# Patient Record
Sex: MALE | Race: WHITE | Employment: OTHER | URBAN - NONMETROPOLITAN AREA
[De-identification: names, ages, dates, MRNs, and addresses within clinical notes are randomized per-mention and may not be internally consistent; named-entity substitution may affect disease eponyms.]

---

## 2020-10-12 ENCOUNTER — APPOINTMENT (OUTPATIENT)
Dept: CT IMAGING | Age: 65
End: 2020-10-12
Payer: MEDICARE

## 2020-10-12 ENCOUNTER — HOSPITAL ENCOUNTER (EMERGENCY)
Age: 65
Discharge: HOME | End: 2020-10-13
Attending: EMERGENCY MEDICINE
Payer: MEDICARE

## 2020-10-12 LAB
BASOPHILS ABSOLUTE: 0.4 K/UL (ref 0–0.2)
BASOPHILS RELATIVE PERCENT: 6.1 %
EOSINOPHILS ABSOLUTE: 0.1 K/UL (ref 0–0.6)
EOSINOPHILS RELATIVE PERCENT: 1.5 %
HCT VFR BLD CALC: 46.5 % (ref 40.5–52.5)
HEMOGLOBIN: 16 G/DL (ref 13.5–17.5)
LYMPHOCYTES ABSOLUTE: 1.7 K/UL (ref 1–5.1)
LYMPHOCYTES RELATIVE PERCENT: 25.6 %
MAGNESIUM: 2.2 MG/DL (ref 1.8–2.4)
MCH RBC QN AUTO: 35 PG (ref 26–34)
MCHC RBC AUTO-ENTMCNC: 34.5 G/DL (ref 31–36)
MCV RBC AUTO: 101.4 FL (ref 80–100)
MONOCYTES ABSOLUTE: 0.7 K/UL (ref 0–1.3)
MONOCYTES RELATIVE PERCENT: 11 %
NEUTROPHILS ABSOLUTE: 3.7 K/UL (ref 1.7–7.7)
NEUTROPHILS RELATIVE PERCENT: 55.8 %
PDW BLD-RTO: 13.3 % (ref 12.4–15.4)
PLATELET # BLD: 215 K/UL (ref 135–450)
PMV BLD AUTO: 7.4 FL (ref 5–10.5)
RBC # BLD: 4.58 M/UL (ref 4.2–5.9)
TROPONIN: <0.01 NG/ML
WBC # BLD: 6.7 K/UL (ref 4–11)

## 2020-10-12 PROCEDURE — 2580000003 HC RX 258: Performed by: EMERGENCY MEDICINE

## 2020-10-12 PROCEDURE — 36415 COLL VENOUS BLD VENIPUNCTURE: CPT

## 2020-10-12 PROCEDURE — 85025 COMPLETE CBC W/AUTO DIFF WBC: CPT

## 2020-10-12 PROCEDURE — 6370000000 HC RX 637 (ALT 250 FOR IP): Performed by: EMERGENCY MEDICINE

## 2020-10-12 PROCEDURE — 84484 ASSAY OF TROPONIN QUANT: CPT

## 2020-10-12 PROCEDURE — 93005 ELECTROCARDIOGRAM TRACING: CPT | Performed by: EMERGENCY MEDICINE

## 2020-10-12 PROCEDURE — 99284 EMERGENCY DEPT VISIT MOD MDM: CPT

## 2020-10-12 PROCEDURE — 70450 CT HEAD/BRAIN W/O DYE: CPT

## 2020-10-12 PROCEDURE — 83735 ASSAY OF MAGNESIUM: CPT

## 2020-10-12 RX ORDER — 0.9 % SODIUM CHLORIDE 0.9 %
1000 INTRAVENOUS SOLUTION INTRAVENOUS ONCE
Status: COMPLETED | OUTPATIENT
Start: 2020-10-12 | End: 2020-10-12

## 2020-10-12 RX ORDER — MECLIZINE HYDROCHLORIDE 25 MG/1
25 TABLET ORAL ONCE
Status: COMPLETED | OUTPATIENT
Start: 2020-10-12 | End: 2020-10-12

## 2020-10-12 RX ORDER — IBUPROFEN 400 MG/1
400 TABLET ORAL NIGHTLY
Status: ON HOLD | COMMUNITY
End: 2021-05-22 | Stop reason: HOSPADM

## 2020-10-12 RX ADMIN — MECLIZINE HYDROCHLORIDE 25 MG: 25 TABLET ORAL at 23:16

## 2020-10-12 RX ADMIN — SODIUM CHLORIDE 1000 ML: 9 INJECTION, SOLUTION INTRAVENOUS at 22:13

## 2020-10-12 NOTE — LETTER
[address] [facility]  [address] [address]  Phone: [phone]               October 13, 2020    Patient: Simi Campos   YOB: 1955   Date of Visit: 10/12/2020       To Whom It May Concern:    Simi Campos was seen and treated in our emergency department on 10/12/2020. Delfino Confer       Sincerely,       Amara Giron RN         Signature:__________________________________

## 2020-10-13 VITALS
SYSTOLIC BLOOD PRESSURE: 122 MMHG | DIASTOLIC BLOOD PRESSURE: 76 MMHG | TEMPERATURE: 96.6 F | OXYGEN SATURATION: 99 % | HEIGHT: 67 IN | WEIGHT: 230 LBS | BODY MASS INDEX: 36.1 KG/M2 | RESPIRATION RATE: 16 BRPM | HEART RATE: 93 BPM

## 2020-10-13 LAB
EKG ATRIAL RATE: 98 BPM
EKG DIAGNOSIS: NORMAL
EKG P AXIS: 47 DEGREES
EKG P-R INTERVAL: 154 MS
EKG Q-T INTERVAL: 370 MS
EKG QRS DURATION: 90 MS
EKG QTC CALCULATION (BAZETT): 472 MS
EKG R AXIS: 18 DEGREES
EKG T AXIS: 45 DEGREES
EKG VENTRICULAR RATE: 98 BPM

## 2020-10-13 PROCEDURE — 93010 ELECTROCARDIOGRAM REPORT: CPT | Performed by: INTERNAL MEDICINE

## 2020-10-13 RX ORDER — MECLIZINE HYDROCHLORIDE 25 MG/1
25 TABLET ORAL 3 TIMES DAILY PRN
Qty: 15 TABLET | Refills: 0 | Status: SHIPPED | OUTPATIENT
Start: 2020-10-13 | End: 2020-10-23

## 2020-10-13 NOTE — ED PROVIDER NOTES
Emergency Department Attending Note    Kimberley Owens MD    Date of ED VIsit: 10/12/2020    CHIEF COMPLAINT  Dizziness (pt states been dizzy all day today, had hx of vertigo)      Kayden Chaney is a 62 y.o. male  With Vital signs of /76   Pulse 93   Temp 96.6 °F (35.9 °C) (Tympanic)   Resp 16   Ht 5' 7\" (1.702 m)   Wt 230 lb (104.3 kg)   SpO2 99%   BMI 36.02 kg/m²  who presents to the ED with a complaint of vertigo. Patient seen and evaluated in room 6. Patient comes in complaining of what what sounds like vertiginous symptoms. He is actually use the term vertigo in his descriptor saying that he has had it before. Today he states that it is definitely room spinning not syncope or near syncope. Happened this evening and that is what prompted him to come into the emergency department for evaluation last time he happened was years ago. He reports no other neurologic symptoms including any visual problems although he is legally blind. He is got no speech problems no other weaknesses in his arms or legs. No nausea with this. .  No other complaints, modifying factors or associated symptoms. Patients Past medical history reviewed and listed below  Past Medical History:   Diagnosis Date    Blind in both eyes     legally    Hyperlipidemia     Hypertension      Past Surgical History:   Procedure Laterality Date    EYE SURGERY         I have reviewed the following from the nursing documentation. History reviewed. No pertinent family history.   Social History     Socioeconomic History    Marital status:      Spouse name: Not on file    Number of children: Not on file    Years of education: Not on file    Highest education level: Not on file   Occupational History    Not on file   Social Needs    Financial resource strain: Not on file    Food insecurity     Worry: Not on file     Inability: Not on file    Transportation needs     Medical: Not on file agreed with the Discharge/transfer planning.     CLINICAL IMPRESSION and DISPOSITION    Daniel Villavicencio was stable and diagnosed with vertigo    Patient was treated with Evelyn Suarez MD  10/13/20 0178

## 2020-10-13 NOTE — ED NOTES
Pt DC home in good condition with RX x 1. V/u of Dc instructions. Denies questions or concerns. Teaching done re: s/s to report.      Norris Piedra RN  10/13/20 1547

## 2020-10-13 NOTE — ED NOTES
Pt states sudden onset of dizziness this am. Pt denies other symptoms at this time.       Namrata Caballero RN  10/12/20 3587

## 2021-05-21 ENCOUNTER — APPOINTMENT (OUTPATIENT)
Dept: CT IMAGING | Age: 66
End: 2021-05-21
Payer: MEDICARE

## 2021-05-21 ENCOUNTER — APPOINTMENT (OUTPATIENT)
Dept: GENERAL RADIOLOGY | Age: 66
End: 2021-05-21
Payer: MEDICARE

## 2021-05-21 ENCOUNTER — APPOINTMENT (OUTPATIENT)
Dept: MRI IMAGING | Age: 66
End: 2021-05-21
Attending: INTERNAL MEDICINE
Payer: MEDICARE

## 2021-05-21 ENCOUNTER — HOSPITAL ENCOUNTER (EMERGENCY)
Age: 66
Discharge: ANOTHER HOSPITAL | End: 2021-05-21
Attending: EMERGENCY MEDICINE
Payer: MEDICARE

## 2021-05-21 ENCOUNTER — HOSPITAL ENCOUNTER (OUTPATIENT)
Age: 66
Setting detail: OBSERVATION
Discharge: HOME | End: 2021-05-22
Attending: INTERNAL MEDICINE | Admitting: INTERNAL MEDICINE
Payer: MEDICARE

## 2021-05-21 VITALS
HEIGHT: 67 IN | SYSTOLIC BLOOD PRESSURE: 114 MMHG | WEIGHT: 230 LBS | TEMPERATURE: 98.6 F | OXYGEN SATURATION: 97 % | HEART RATE: 84 BPM | BODY MASS INDEX: 36.1 KG/M2 | DIASTOLIC BLOOD PRESSURE: 90 MMHG | RESPIRATION RATE: 18 BRPM

## 2021-05-21 DIAGNOSIS — R20.0 NUMBNESS OF RIGHT FOOT: ICD-10-CM

## 2021-05-21 DIAGNOSIS — R03.0 ELEVATED BLOOD PRESSURE READING: ICD-10-CM

## 2021-05-21 DIAGNOSIS — G45.9 TIA (TRANSIENT ISCHEMIC ATTACK): Primary | ICD-10-CM

## 2021-05-21 PROBLEM — I10 HTN (HYPERTENSION): Status: ACTIVE | Noted: 2021-05-21

## 2021-05-21 PROBLEM — E78.49 OTHER HYPERLIPIDEMIA: Status: ACTIVE | Noted: 2021-05-21

## 2021-05-21 PROBLEM — F17.200 SMOKING: Status: ACTIVE | Noted: 2021-05-21

## 2021-05-21 PROBLEM — M54.50 LOW BACK PAIN: Status: ACTIVE | Noted: 2021-05-21

## 2021-05-21 LAB
A/G RATIO: 1.3 (ref 1.1–2.2)
ALBUMIN SERPL-MCNC: 4 G/DL (ref 3.4–5)
ALP BLD-CCNC: 59 U/L (ref 40–129)
ALT SERPL-CCNC: 27 U/L (ref 10–40)
ANION GAP SERPL CALCULATED.3IONS-SCNC: 11 MMOL/L (ref 3–16)
AST SERPL-CCNC: 19 U/L (ref 15–37)
BASOPHILS ABSOLUTE: 0.1 K/UL (ref 0–0.2)
BASOPHILS RELATIVE PERCENT: 1.3 %
BILIRUB SERPL-MCNC: 0.6 MG/DL (ref 0–1)
BUN BLDV-MCNC: 11 MG/DL (ref 7–20)
CALCIUM SERPL-MCNC: 9.4 MG/DL (ref 8.3–10.6)
CHLORIDE BLD-SCNC: 102 MMOL/L (ref 99–110)
CO2: 23 MMOL/L (ref 21–32)
CREAT SERPL-MCNC: 1 MG/DL (ref 0.8–1.3)
EKG ATRIAL RATE: 86 BPM
EKG DIAGNOSIS: NORMAL
EKG P AXIS: 45 DEGREES
EKG P-R INTERVAL: 160 MS
EKG Q-T INTERVAL: 390 MS
EKG QRS DURATION: 92 MS
EKG QTC CALCULATION (BAZETT): 466 MS
EKG R AXIS: 48 DEGREES
EKG T AXIS: 42 DEGREES
EKG VENTRICULAR RATE: 86 BPM
EOSINOPHILS ABSOLUTE: 0.1 K/UL (ref 0–0.6)
EOSINOPHILS RELATIVE PERCENT: 1.1 %
GFR AFRICAN AMERICAN: >60
GFR NON-AFRICAN AMERICAN: >60
GLOBULIN: 3.1 G/DL
GLUCOSE BLD-MCNC: 105 MG/DL (ref 70–99)
HCT VFR BLD CALC: 46 % (ref 40.5–52.5)
HEMOGLOBIN: 15.6 G/DL (ref 13.5–17.5)
INR BLD: 1.11 (ref 0.86–1.14)
LYMPHOCYTES ABSOLUTE: 2.9 K/UL (ref 1–5.1)
LYMPHOCYTES RELATIVE PERCENT: 36.6 %
MCH RBC QN AUTO: 34.6 PG (ref 26–34)
MCHC RBC AUTO-ENTMCNC: 33.8 G/DL (ref 31–36)
MCV RBC AUTO: 102.4 FL (ref 80–100)
MONOCYTES ABSOLUTE: 0.7 K/UL (ref 0–1.3)
MONOCYTES RELATIVE PERCENT: 8.7 %
NEUTROPHILS ABSOLUTE: 4.1 K/UL (ref 1.7–7.7)
NEUTROPHILS RELATIVE PERCENT: 52.3 %
PDW BLD-RTO: 13.8 % (ref 12.4–15.4)
PLATELET # BLD: 207 K/UL (ref 135–450)
PMV BLD AUTO: 7.1 FL (ref 5–10.5)
POTASSIUM REFLEX MAGNESIUM: 3.7 MMOL/L (ref 3.5–5.1)
PROTHROMBIN TIME: 12.8 SEC (ref 10–13.2)
RBC # BLD: 4.49 M/UL (ref 4.2–5.9)
SODIUM BLD-SCNC: 136 MMOL/L (ref 136–145)
TOTAL PROTEIN: 7.1 G/DL (ref 6.4–8.2)
WBC # BLD: 7.8 K/UL (ref 4–11)

## 2021-05-21 PROCEDURE — 80053 COMPREHEN METABOLIC PANEL: CPT

## 2021-05-21 PROCEDURE — 97165 OT EVAL LOW COMPLEX 30 MIN: CPT

## 2021-05-21 PROCEDURE — 85610 PROTHROMBIN TIME: CPT

## 2021-05-21 PROCEDURE — G0378 HOSPITAL OBSERVATION PER HR: HCPCS

## 2021-05-21 PROCEDURE — 99223 1ST HOSP IP/OBS HIGH 75: CPT | Performed by: PSYCHIATRY & NEUROLOGY

## 2021-05-21 PROCEDURE — 72148 MRI LUMBAR SPINE W/O DYE: CPT

## 2021-05-21 PROCEDURE — 70498 CT ANGIOGRAPHY NECK: CPT

## 2021-05-21 PROCEDURE — 93005 ELECTROCARDIOGRAM TRACING: CPT | Performed by: EMERGENCY MEDICINE

## 2021-05-21 PROCEDURE — 70450 CT HEAD/BRAIN W/O DYE: CPT

## 2021-05-21 PROCEDURE — 96372 THER/PROPH/DIAG INJ SC/IM: CPT

## 2021-05-21 PROCEDURE — 36415 COLL VENOUS BLD VENIPUNCTURE: CPT

## 2021-05-21 PROCEDURE — 85025 COMPLETE CBC W/AUTO DIFF WBC: CPT

## 2021-05-21 PROCEDURE — 6360000004 HC RX CONTRAST MEDICATION: Performed by: EMERGENCY MEDICINE

## 2021-05-21 PROCEDURE — APPNB60 APP NON BILLABLE TIME 46-60 MINS: Performed by: NURSE PRACTITIONER

## 2021-05-21 PROCEDURE — 71045 X-RAY EXAM CHEST 1 VIEW: CPT

## 2021-05-21 PROCEDURE — 97535 SELF CARE MNGMENT TRAINING: CPT

## 2021-05-21 PROCEDURE — 99285 EMERGENCY DEPT VISIT HI MDM: CPT

## 2021-05-21 PROCEDURE — G0379 DIRECT REFER HOSPITAL OBSERV: HCPCS

## 2021-05-21 PROCEDURE — 1200000000 HC SEMI PRIVATE

## 2021-05-21 PROCEDURE — 2580000003 HC RX 258: Performed by: INTERNAL MEDICINE

## 2021-05-21 PROCEDURE — 6360000002 HC RX W HCPCS: Performed by: INTERNAL MEDICINE

## 2021-05-21 PROCEDURE — 2580000003 HC RX 258: Performed by: EMERGENCY MEDICINE

## 2021-05-21 PROCEDURE — 6370000000 HC RX 637 (ALT 250 FOR IP): Performed by: INTERNAL MEDICINE

## 2021-05-21 PROCEDURE — 93010 ELECTROCARDIOGRAM REPORT: CPT | Performed by: INTERNAL MEDICINE

## 2021-05-21 PROCEDURE — 70551 MRI BRAIN STEM W/O DYE: CPT

## 2021-05-21 PROCEDURE — 83036 HEMOGLOBIN GLYCOSYLATED A1C: CPT

## 2021-05-21 PROCEDURE — 6370000000 HC RX 637 (ALT 250 FOR IP): Performed by: EMERGENCY MEDICINE

## 2021-05-21 RX ORDER — PROMETHAZINE HYDROCHLORIDE 25 MG/1
12.5 TABLET ORAL EVERY 6 HOURS PRN
Status: DISCONTINUED | OUTPATIENT
Start: 2021-05-21 | End: 2021-05-22 | Stop reason: HOSPADM

## 2021-05-21 RX ORDER — ASPIRIN 325 MG
325 TABLET ORAL ONCE
Status: COMPLETED | OUTPATIENT
Start: 2021-05-21 | End: 2021-05-21

## 2021-05-21 RX ORDER — ONDANSETRON 2 MG/ML
4 INJECTION INTRAMUSCULAR; INTRAVENOUS EVERY 6 HOURS PRN
Status: DISCONTINUED | OUTPATIENT
Start: 2021-05-21 | End: 2021-05-22 | Stop reason: HOSPADM

## 2021-05-21 RX ORDER — SODIUM CHLORIDE 9 MG/ML
25 INJECTION, SOLUTION INTRAVENOUS PRN
Status: DISCONTINUED | OUTPATIENT
Start: 2021-05-21 | End: 2021-05-22 | Stop reason: HOSPADM

## 2021-05-21 RX ORDER — ASPIRIN 81 MG/1
81 TABLET ORAL DAILY
Status: DISCONTINUED | OUTPATIENT
Start: 2021-05-21 | End: 2021-05-22 | Stop reason: HOSPADM

## 2021-05-21 RX ORDER — SODIUM CHLORIDE 0.9 % (FLUSH) 0.9 %
5-40 SYRINGE (ML) INJECTION EVERY 12 HOURS SCHEDULED
Status: DISCONTINUED | OUTPATIENT
Start: 2021-05-21 | End: 2021-05-22 | Stop reason: HOSPADM

## 2021-05-21 RX ORDER — ATORVASTATIN CALCIUM 80 MG/1
80 TABLET, FILM COATED ORAL NIGHTLY
Status: DISCONTINUED | OUTPATIENT
Start: 2021-05-21 | End: 2021-05-22 | Stop reason: HOSPADM

## 2021-05-21 RX ORDER — 0.9 % SODIUM CHLORIDE 0.9 %
1000 INTRAVENOUS SOLUTION INTRAVENOUS ONCE
Status: COMPLETED | OUTPATIENT
Start: 2021-05-21 | End: 2021-05-21

## 2021-05-21 RX ORDER — ASPIRIN 300 MG/1
300 SUPPOSITORY RECTAL DAILY
Status: DISCONTINUED | OUTPATIENT
Start: 2021-05-21 | End: 2021-05-22 | Stop reason: HOSPADM

## 2021-05-21 RX ORDER — POLYETHYLENE GLYCOL 3350 17 G/17G
17 POWDER, FOR SOLUTION ORAL DAILY PRN
Status: DISCONTINUED | OUTPATIENT
Start: 2021-05-21 | End: 2021-05-22 | Stop reason: HOSPADM

## 2021-05-21 RX ORDER — SODIUM CHLORIDE 0.9 % (FLUSH) 0.9 %
5-40 SYRINGE (ML) INJECTION PRN
Status: DISCONTINUED | OUTPATIENT
Start: 2021-05-21 | End: 2021-05-22 | Stop reason: HOSPADM

## 2021-05-21 RX ADMIN — Medication 10 ML: at 10:39

## 2021-05-21 RX ADMIN — SODIUM CHLORIDE 1000 ML: 9 INJECTION, SOLUTION INTRAVENOUS at 02:50

## 2021-05-21 RX ADMIN — IOPAMIDOL 75 ML: 755 INJECTION, SOLUTION INTRAVENOUS at 03:10

## 2021-05-21 RX ADMIN — ASPIRIN 81 MG: 81 TABLET, COATED ORAL at 11:15

## 2021-05-21 RX ADMIN — Medication 10 ML: at 20:19

## 2021-05-21 RX ADMIN — ENOXAPARIN SODIUM 40 MG: 40 INJECTION SUBCUTANEOUS at 10:38

## 2021-05-21 RX ADMIN — ASPIRIN 325 MG: 325 TABLET, FILM COATED ORAL at 02:51

## 2021-05-21 RX ADMIN — ATORVASTATIN CALCIUM 80 MG: 80 TABLET, FILM COATED ORAL at 20:18

## 2021-05-21 ASSESSMENT — PAIN DESCRIPTION - ORIENTATION: ORIENTATION: RIGHT

## 2021-05-21 ASSESSMENT — PAIN DESCRIPTION - LOCATION: LOCATION: FOOT

## 2021-05-21 ASSESSMENT — PAIN SCALES - GENERAL
PAINLEVEL_OUTOF10: 0
PAINLEVEL_OUTOF10: 0

## 2021-05-21 ASSESSMENT — PAIN DESCRIPTION - DESCRIPTORS: DESCRIPTORS: NUMBNESS

## 2021-05-21 NOTE — PROGRESS NOTES
Pt alert and oriented. VSS. Pt admitted to room 5303. Pt oriented to room call lights and bathroom. Pt denies weakness in leg. Pt ambulating in the room with steady gait. 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 Pt able to swallow sips of water and pills. No complication noted. Pt on general det. Will continue to monitor. Call lights within reach. Bed alarm on. Bed in lower position.

## 2021-05-21 NOTE — CARE COORDINATION
CM following, pt from home with wife (pt blind), plans to return home. Neuro following, pending MRI brain and L-spine. Pending PT OT evals for DC recs.   Electronically signed by Lj Kurtz RN on 5/21/2021 at 3:40 PM  [phone]

## 2021-05-21 NOTE — PROGRESS NOTES
Speech Language Pathology    Acknowledged evaluation order. Spoke with RN, who states pt tolerating diet well; unable to complete evaluation at this time as pt about to go down for MRI. Will hold, and plan to re-attempt tomorrow as able.     Hugo Murphy M.A., [id number] [facility].[id number]  Speech-Language Pathologist  Pager: [phone]

## 2021-05-21 NOTE — H&P
Hospital Medicine History & Physical      PCP: No primary care provider on file. Date of Admission: 5/21/2021    Date of Service: Pt seen/examined on 5/21/2021 and Admitted to Inpatient     Chief Complaint: Right leg numbness and weakness    History Of Present Illness: This is a 80-year-old male with a past medical history of legally blind, hyperlipidemia, hypertension who is presenting today from outside hospital with right leg weakness and numbness that started yesterday. He was also having difficulty ambulating with that leg, and his leg gave out on him. By the time he reached outside ER numbness and tingling was improving there was no weakness. Patient is denying any loss of consciousness no lightheadedness no dizziness no chest pain no shortness of breath. He had an episode of vertigo about 7-8 months ago when he had dizziness. Smokes 1 PPD for last 40 yrs . 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 Authorizing Provider   ibuprofen (ADVIL;MOTRIN) 400 MG tablet Take 400 mg by mouth nightly    Historical Provider, MD       Allergies:  Patient has no known allergies. Social History:  The patient currently lives at home with family    TOBACCO:   reports that he has been smoking cigarettes. He has been smoking about 1.00 pack per day. He has never used smokeless tobacco.  ETOH:   reports no history of alcohol use. Family History:  Reviewed in detail and negative for DM, Early CAD, Cancer, CVA. Positive as follows:    No family history on file. REVIEW OF SYSTEMS:   Positive and negative as noted in the HPI. All other systems reviewed and negative. PHYSICAL EXAM:    /81   Pulse 84   Temp 97.2 °F (36.2 °C) (Oral)   Resp 18   SpO2 96%     General appearance: No apparent distress appears stated age and cooperative. HEENT Normal cephalic, atraumatic without obvious deformity. Pupils equal, round, and reactive to light. Extra ocular muscles intact. Conjunctivae/corneas clear. Neck: Supple, No jugular venous distention/bruits. Trachea midline without thyromegaly or adenopathy with full range of motion. Lungs: Clear to auscultation, bilaterally without Rales/Wheezes/Rhonchi with good respiratory effort. Heart: Regular rate and rhythm with Normal S1/S2 without murmurs, rubs or gallops, point of maximum impulse non-displaced  Abdomen: Soft, non-tender or non-distended without rigidity or guarding and positive bowel sounds all four quadrants. Extremities: No clubbing, cyanosis, or edema bilaterally. Full range of motion without deformity and normal gait intact. Skin: Skin color, texture, turgor normal.  No rashes or lesions. Neurologic: Alert and oriented X 3, neurovascularly intact with sensory/motor intact upper extremities/lower extremities, bilaterally. Cranial nerves: II-XII intact, grossly non-focal.  Mental status: Alert, oriented, thought content appropriate.   Capillary Refill: Acceptable  < 3 seconds  Peripheral Pulses: +3 Easily felt, not easily obliterated with pressure      CXR:  I have reviewed the CXR with the following interpretation: Enlarged cardiac silhouette  EKG:  I have reviewed the EKG with the following interpretation: NSR    CBC   Recent Labs     05/21/21 0215   WBC 7.8   HGB 15.6   HCT 46.0         RENAL  Recent Labs     05/21/21 0215      K 3.7      CO2 23   BUN 11   CREATININE 1.0     LFT'S  Recent Labs     05/21/21 0215   AST 19   ALT 27   BILITOT 0.6   ALKPHOS 59     COAG  Recent Labs 05/21/21  0246   INR 1.11     CARDIAC ENZYMES  No results for input(s): CKTOTAL, CKMB, CKMBINDEX, TROPONINI in the last 72 hours. U/A:  No results found for: NITRITE, COLORU, WBCUA, RBCUA, MUCUS, BACTERIA, CLARITYU, SPECGRAV, LEUKOCYTESUR, BLOODU, GLUCOSEU, AMORPHOUS    ABG  No results found for: YLG9JUY, BEART, K7CRXBPN, PHART, THGBART, HGU7COG, PO2ART, ZFU6UQB        Active Hospital Problems    Diagnosis Date Noted    TIA (transient ischemic attack) [G45.9] 05/21/2021         PHYSICIANS CERTIFICATION:    I certify that Petros Canchola is expected to be hospitalized for more  than 2 midnights based on the following assessment and plan:      ASSESSMENT/PLAN:    1. TIA:-  Get MRI brain  Check lipid, TSH, A1C  Check Echo  Start ASA, Start statin  Neuro checks every 4 hrs  Neuro consulted   Keep HOB elevated  Keep BP between 120/80 to 160/100  PT/OT  Swallow, speech eval    2. Hypertension controlled  Not on any medications at home    3. Leg numbness weakness  Checking MRI back    DVT Prophylaxis: Lovenox  Diet: Diet NPO Effective Now  Code Status: Full Code  PT/OT Eval Status: Will order     Jesus Wang MD    Thank you No primary care provider on file. for the opportunity to be involved in this patient's care. If you have any questions or concerns please feel free to contact me at [phone].

## 2021-05-21 NOTE — PROGRESS NOTES
4 Eyes Admission Assessment     I agree as the admission nurse that 2 RN's have performed a thorough Head to Toe Skin Assessment on the patient. ALL assessment sites listed below have been assessed on admission. Areas assessed by both nurses:   [x]   Head, Face, and Ears   [x]   Shoulders, Back, and Chest  [x]   Arms, Elbows, and Hands   [x]   Coccyx, Sacrum, and Ischium  [x]   Legs, Feet, and Heels        Does the Patient have Skin Breakdown?   No         Leobardo Prevention initiated:  No   Wound Care Orders initiated:  No      [facility] nurse consulted for Pressure Injury (Stage 3,4, Unstageable, DTI, NWPT, and Complex wounds) or Leobardo score 18 or lower:  No      Nurse 1 eSignature: Electronically signed by Rosita Pablo RN on 5/21/21 at 6:40 PM EDT    **SHARE this note so that the co-signing nurse is able to place an eSignature**    Nurse 2 eSignature: Electronically signed by Aide Crystal RN on 5/21/21 at 6:49 PM EDT

## 2021-05-21 NOTE — CONSULTS
Neurology consult Note    Nona Lewis MD requesting this consult. CC: Right leg numbness and transient weakness  HPI: Patient is a 60 y.o. y/o male with PMH of HTN, HLD, blindness, current smoker who presented with transient R leg weakness and numbness. He stated this started suddenly, tried to ambulate but felt as if his leg gave out. This happened again with leg giving out in ED. Numbness was slower to resolve, but is now gone as well. Has never had this previously. Does have hx of low back pain as well. No other weakness noted. Past Medical History:   Diagnosis Date    Blind in both eyes     legally    Hyperlipidemia     Hypertension      Past Surgical History:   Procedure Laterality Date    EYE SURGERY         CURRENT MEDICATIONS:    Current Facility-Administered Medications:     sodium chloride flush 0.9 % injection 5-40 mL, 5-40 mL, Intravenous, 2 times per day, Nona Lewis MD    sodium chloride flush 0.9 % injection 5-40 mL, 5-40 mL, Intravenous, PRN, Nona Lewis MD    0.9 % sodium chloride infusion, 25 mL, Intravenous, PRN, Nona Lewis MD    promethazine (PHENERGAN) tablet 12.5 mg, 12.5 mg, Oral, Q6H PRN **OR** ondansetron (ZOFRAN) injection 4 mg, 4 mg, Intravenous, Q6H PRN, Nona Lewis MD    polyethylene glycol (GLYCOLAX) packet 17 g, 17 g, Oral, Daily PRN, Nona Lewis MD    enoxaparin (LOVENOX) injection 40 mg, 40 mg, Subcutaneous, Daily, Nona Lewis MD    aspirin EC tablet 81 mg, 81 mg, Oral, Daily **OR** aspirin suppository 300 mg, 300 mg, Rectal, Daily, Nona Lewis MD    perflutren lipid microspheres (DEFINITY) injection 1.65 mg, 1.5 mL, Intravenous, ONCE PRN, Nona Lewis MD    atorvastatin (LIPITOR) tablet 80 mg, 80 mg, Oral, Nightly, Nona Lewis MD    ROS:   Constitutional- No weight loss or fevers   Eyes- Legally blind  Ears/nose/throat- No dysphagia. No Dysarthria   Cardiovascular- No palpitations.  No chest pain Respiratory- No dyspnea. No Cough   Gastrointestinal- No Abdominal pain. No Vomiting. Genitourinary- No incontinence. No urinary retention   Musculoskeletal- No myalgia. No arthralgia   Skin- No rash. No easy bruising. Psychiatric- No depression. No anxiety   Endocrine- No diabetes. No thyroid issues. Hematologic- No bleeding difficulty. No fatigue   Neurologic- No Headache. Transient R leg weakness / numbness     Constitutional  /81   Pulse 84   Temp 97.2 °F (36.2 °C) (Oral)   Resp 18   SpO2 96%     General Alert, no distress, well-nourished  Cardiovascular: Warm well perfused   Respiratory: Unlabored respiratory pattern    Neurologic  Mental status:  orientation to person, place, time, situation   Attention intact as able to attend well to the exam     Language fluent in conversation   Comprehension intact; follows simple commands    Cranial nerves:   CN2: Blind  CN 3,4,6: pupils equal and reactive to light, extraocular muscles intact,  CN5: facial sensation symmetric   CN7:face symmetric  CN8: hearing symmetric to voice  CN9: palate elevated symmetrically  CN11: trap full strength on shoulder shrug  CN12: tongue midline with protrusion  Motor Exam:  5/5 strength throughout      Deep tendon reflexes:    R  L    Patellar  1+ 2+   Achilles  0 0     Sensory: light touch intact and symmetric in all 4 extremities. Tone: normal in all 4 extremities  Gait: did not ambulate, moves around easily in bed to sit on side of bed    Images:  CT head wo contrast: 5/21/2021  Impression   No acute intracranial abnormality. CTA head and neck: 5/21/2021      Impression   No flow-limiting arterial stenosis in the head and neck. MRI wo contrast: Pending    Assessment:  Patient is a 61 y/o M who presented with c/o R leg numbness and weakness, weakness resolved in route to hospital with EMS, numbness still present to R foot - concerning for possible TIA.  With low back pain, decreased reflex on R will check lumbar MRI as well to assess for possible nerve root compression as source of intermittent symptoms.      Plan:  -Obtain MRI of the brain wo contrast to eval for stroke  -MRI lumbar spine w/o assess for lumbar stenosis / nerve root compression  -Vessel imaging: Completed and shows no stenosis  -Echocardiogram with bubble - if not previously completed   -Nursing bedside swallow prior to any PO intake   -ASA 81mg PO daily  -High-intensity statin   -Okay for DVT chemoprophylaxis   -PT/OT: eval and treat, PMR consult if rehab appropriate   -ST: eval and treat and dysphagia screen  -Allow BP to autoregulate for first 24 hours after stroke and treat BP >220/110 with labetalol   -Q4H neuro checks  -Q4H vital signs  -Check lipid panel and hemoglobin A1C if not already completed   -Telemetry       JANET Cheney - CNP  Neurology  [phone]

## 2021-05-21 NOTE — ED PROVIDER NOTES
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 Social History     Socioeconomic History    Marital status:      Spouse name: Not on file    Number of children: Not on file    Years of education: Not on file    Highest education level: Not on file   Occupational History    Not on file   Tobacco Use    Smoking status: Current Every Day Smoker     Packs/day: 1.00     Types: Cigarettes    Smokeless tobacco: Never Used   Substance and Sexual Activity    Alcohol use: No    Drug use: No    Sexual activity: Not on file   Other Topics Concern    Not on file   Social History Narrative    Not on file     Social Determinants of Health     Financial Resource Strain:     Difficulty of Paying Living Expenses:    Food Insecurity:     Worried About Running Out of Food in the Last Year:     [address] in the Last Year:    Transportation Needs:     Lack of Transportation (Medical):  Lack of Transportation (Non-Medical):    Physical Activity:     Days of Exercise per Week:     Minutes of Exercise per Session:    Stress:     Feeling of Stress :    Social Connections:     Frequency of Communication with Friends and Family:     Frequency of Social Gatherings with Friends and Family:     Attends Voodoo Services:     Active Member of Clubs or Organizations:     Attends Club or Organization Meetings:     Marital Status:    Intimate Partner Violence:     Fear of Current or Ex-Partner:     Emotionally Abused:     Physically Abused:     Sexually Abused:        Nursing notes reviewed.     ED Triage Vitals [05/21/21 0233]   Enc Vitals Group      BP (!) 145/85      Pulse 89      Resp 19      Temp 98.6 °F (37 °C)      Temp Source Oral      SpO2 98 %      Weight 230 lb (104.3 kg)      Height 5' 7\" (1.702 m)      Head Circumference       Peak Flow       Pain Score       Pain Loc       Pain Edu? Excl. in [address]? GENERAL:   Body mass index is 36.02 kg/m². Awake, alert. Well developed, well nourished with no apparent distress. Nontoxic-appearing, non-ill-appearing. HENT:   Normocephalic, Atraumatic, no lacerations. No ENT exam due to PPE. EYES:   Conjunctiva normal,   Pupils equal round and reactive to light,   Extraocular movements normal.  NECK:  Trachea is midline. No stridor. CHEST:  Regular rate and regular rhythm, no murmurs/rubs/gallops,  normal S1/S2, chest wall non-tender. LUNGS:  No respiratory distress. No abdominal retractions, no sternal retractions  Clear to auscultation bilaterally, no wheezing, no rhochi, no rales  Speaking comfortably in full sentences  ABDOMEN:  Soft, non-tender, non-distended. No guarding. No rebound. No costovertebral angle tenderness to palpation. Normal BS, no organomegaly, no abdominal masses  EXTREMITIES:  Moves extremities x4 with purpose. Normal range of motion, mild bilateral lower extremity nonpitting edema,  No tenderness, no deformity,  distal pulses present and equal bilaterally. Right foot is warm to the touch, the skin is not cyanotic, he has good posterior tibialis pulse in the right foot. SKIN:  Warm, dry and intact. NEUROLOGIC:  Normal mental status. Moving all extremities to command. Alert and oriented x4  without focal motor deficit or gross sensory deficit. Normal speech. Strength 5/5 in bilateral upper and lower extremities. Sensation is intact in the upper and lower extremities. Unable to do visual assessment, however patient is able to move his eyes laterally to both sides. He can take each finger from each hand and move it to his nose.   Daniel Stevenson's NIH Stroke Scale at 2:39 AM is:  Level of Consciousness:  0 - alert; keenly responsive  LOC Questions:  0 - answers both questions correctly  LOC Commands:  0 - performs both tasks correctly  Best Gaze:  0 - normal  Visual Fields:  3 - bilateral hemianopia (blind including cortical blindness  Facial Palsy:  0 - normal symmetric movement  Motor-Arm-Left:  0 - no drift, limb holds 90 (or 45) degrees for full 10 seconds  Motor-Leg-Left:  0 - no drift; leg holds 30 degree position for full 5 seconds  Motor-Arm-Right:  0 - no drift, limb holds 90 (or 45) degrees for full 10 seconds  Motor-Leg-Right:  0 - no drift; leg holds 30 degree position for full 5 seconds  Limb Ataxia:  0 - absent  Sensory:  0 - normal; no sensory loss  Best Language:  0 - no aphasia, normal  Dysarthria:  0 - normal  Extinction and Inattention:  0 - no abnormality  NIHSS Total:  3  PSYCHIATRIC:  Not anxious,  normal mood and affect,  Appropriate eye contact,  thoughts are linear and organized,  without delusions/hallucinations,  Not responding to internal stimuli,  responds appropriately to questions  Denies SI/HI    LABS and DIAGNOSTIC RESULTS  EKG  The Ekg interpreted by me shows  normal sinus rhythm with a rate of 86  Axis is   Normal  QTc is  within an acceptable range  Intervals and Durations are unremarkable. ST Segments: normal  Delta waves, Brugada Syndrome, and Short ID are not present. Prior EKG to compare with was available. No significant changes compared to prior EKG from October 12, 2020      RADIOLOGY  X-RAYS:  I have reviewed radiologic plain film image(s). ALL OTHER NON-PLAIN FILM IMAGES SUCH AS CT, ULTRASOUND AND MRI HAVE BEEN READ BY THE RADIOLOGIST. CTA HEAD NECK W CONTRAST   Final Result   No flow-limiting arterial stenosis in the head and neck. CT HEAD WO CONTRAST   Final Result   No acute intracranial abnormality. XR CHEST PORTABLE   Final Result   Enlarged cardiac silhouette. LABS  Results for orders placed or performed during the hospital encounter of 05/21/21   CBC Auto Differential   Result Value Ref Range    WBC 7.8 4.0 - 11.0 K/uL    RBC 4.49 4.20 - 5.90 M/uL    Hemoglobin 15.6 13.5 - 17.5 g/dL    Hematocrit 46.0 40.5 - 52.5 %    .4 (H) 80.0 - 100.0 fL    MCH 34.6 (H) 26.0 - 34.0 pg    MCHC 33.8 31.0 - 36.0 g/dL    RDW 13.8 12.4 - 15.4 %    Platelets 729 466 - 583 K/uL    MPV 7.1 5.0 - 10.5 fL    Neutrophils % 52.3 %    Lymphocytes % 36.6 %    Monocytes % 8.7 %    Eosinophils % 1.1 %    Basophils % 1.3 %    Neutrophils Absolute 4.1 1.7 - 7.7 K/uL    Lymphocytes Absolute 2.9 1.0 - 5.1 K/uL    Monocytes Absolute 0.7 0.0 - 1.3 K/uL    Eosinophils Absolute 0.1 0.0 - 0.6 K/uL    Basophils Absolute 0.1 0.0 - 0.2 K/uL   Comprehensive Metabolic Panel w/ Reflex to MG   Result Value Ref Range    Sodium 136 136 - 145 mmol/L    Potassium reflex Magnesium 3.7 3.5 - 5.1 mmol/L    Chloride 102 99 - 110 mmol/L    CO2 23 21 - 32 mmol/L    Anion Gap 11 3 - 16    Glucose 105 (H) 70 - 99 mg/dL    BUN 11 7 - 20 mg/dL    CREATININE 1.0 0.8 - 1.3 mg/dL    GFR Non-African American >60 >60    GFR African American >60 >60    Calcium 9.4 8.3 - 10.6 mg/dL    Total Protein 7.1 6.4 - 8.2 g/dL    Albumin 4.0 3.4 - 5.0 g/dL    Albumin/Globulin Ratio 1.3 1.1 - 2.2    Total Bilirubin 0.6 0.0 - 1.0 mg/dL    Alkaline Phosphatase 59 40 - 129 U/L    ALT 27 10 - 40 U/L    AST 19 15 - 37 U/L    Globulin 3.1 g/dL   Protime-INR   Result Value Ref Range    Protime 12.8 10.0 - 13.2 sec    INR 1.11 0.86 - 1.14       SCREENINGS  NIH Score     [facility]     [facility]    Heart Score       PROCEDURES    MEDICAL DECISION MAKING    Procedures/interventions/images ordered for this visit  Orders Placed This Encounter   Procedures    CT HEAD WO CONTRAST    XR CHEST PORTABLE    CTA HEAD NECK W CONTRAST    CBC Auto Differential    Comprehensive Metabolic Panel w/ Reflex to MG    Protime-INR    Diet NPO Effective Now  Swallow assessment by nursing before diet and oral medications started    NIH Stroke Scale (NIHSS)    Pharmacy to Dose: Other - See Comments: The pharmacist will review this patient's medication profile to evaluate IV medications and change all base solutions to 0.9% sodium chloride if possible based on compatibility and product availability. This. .. [address] to change base solutions.  Initiate Oxygen Therapy Protocol    EKG 12 Lead    Saline lock IV    Seizure precautions       Medications ordered for this visit  Orders Placed This Encounter   Medications    0.9 % sodium chloride bolus    aspirin tablet 325 mg       ED course notes for this visit       I wore surgical mask and gloves when I evaluated the patient. I evaluated the patient in room 07/07    I spoke with Dr. Ann Monae, hospitalist. We thoroughly discussed the history, physical exam, laboratory and imaging studies, as well as, current course of treatment within the emergency department. Based upon that discussion, we've decided to transfer Radha Hubbard to [facility], for further observation and evaluation of Daniel Ramírez current condition. As I have deemed necessary from their history, physical, and studies, I have considered and evaluated Radha Hubbard for the following diagnoses:  Differential diagnosis: thrombotic stroke, embolic stroke, hemorrhagic stroke, TIA,  hypoglycemia, mass lesions, metabolic cause, head injury, encephalopathy, multiple sclerosis, seizure, other      CLINICAL IMPRESSION:  1. TIA (transient ischemic attack)    2. Numbness of right foot    3. Elevated blood pressure reading        BP (!) 145/85   Pulse 89   Temp 98.6 °F (37 °C) (Oral)   Resp 19   Ht 5' 7\" (1.702 m)   Wt 230 lb (104.3 kg)   SpO2 98%   BMI 36.02 kg/m²       Disposition  Patient understands that they are going to be transferred to another facility.   There was shared decision making between myself as well as the patient and/or their surrogate and we are in agreement with transfer. There is an opportunity for questions and all questions answered to the best of my ability and to the satisfaction of the patient and/or patient's family. Pt is in stable condition upon Transfer to [facility]. The note was completed using Dragon voice recognition transcription. Every effort was made to ensure accuracy; however, inadvertent transcription errors may be present despite my best efforts to edit errors.     Jadyn Garcia MD  [address] [place] [address], MD  05/21/21 7135

## 2021-05-21 NOTE — PLAN OF CARE
Problem: Falls - Risk of:  Goal: Will remain free from falls  Description: Will remain free from falls  Outcome: Ongoing  Note: Pt oriented to room, restroom, telephone and call light. Fall risk instructions provided. Pt in bed, bed alarm on. Pt using the call lights properly.

## 2021-05-22 VITALS
HEART RATE: 87 BPM | DIASTOLIC BLOOD PRESSURE: 79 MMHG | TEMPERATURE: 98.1 F | BODY MASS INDEX: 38.92 KG/M2 | RESPIRATION RATE: 16 BRPM | OXYGEN SATURATION: 96 % | HEIGHT: 67 IN | WEIGHT: 248 LBS | SYSTOLIC BLOOD PRESSURE: 138 MMHG

## 2021-05-22 PROBLEM — R29.90 STROKE-LIKE SYMPTOM: Status: ACTIVE | Noted: 2021-05-22

## 2021-05-22 LAB
CHOLESTEROL, TOTAL: 230 MG/DL (ref 0–199)
ESTIMATED AVERAGE GLUCOSE: 108.3 MG/DL
HBA1C MFR BLD: 5.4 %
HCT VFR BLD CALC: 43.6 % (ref 40.5–52.5)
HDLC SERPL-MCNC: 31 MG/DL (ref 40–60)
HEMOGLOBIN: 15.4 G/DL (ref 13.5–17.5)
LDL CHOLESTEROL CALCULATED: 167 MG/DL
MCH RBC QN AUTO: 36.1 PG (ref 26–34)
MCHC RBC AUTO-ENTMCNC: 35.2 G/DL (ref 31–36)
MCV RBC AUTO: 102.3 FL (ref 80–100)
PDW BLD-RTO: 13.9 % (ref 12.4–15.4)
PLATELET # BLD: 198 K/UL (ref 135–450)
PMV BLD AUTO: 7.4 FL (ref 5–10.5)
RBC # BLD: 4.26 M/UL (ref 4.2–5.9)
TRIGL SERPL-MCNC: 158 MG/DL (ref 0–150)
VLDLC SERPL CALC-MCNC: 32 MG/DL
WBC # BLD: 6.9 K/UL (ref 4–11)

## 2021-05-22 PROCEDURE — 6360000002 HC RX W HCPCS: Performed by: INTERNAL MEDICINE

## 2021-05-22 PROCEDURE — 36415 COLL VENOUS BLD VENIPUNCTURE: CPT

## 2021-05-22 PROCEDURE — 97161 PT EVAL LOW COMPLEX 20 MIN: CPT

## 2021-05-22 PROCEDURE — 92526 ORAL FUNCTION THERAPY: CPT | Performed by: SPEECH-LANGUAGE PATHOLOGIST

## 2021-05-22 PROCEDURE — 2580000003 HC RX 258: Performed by: INTERNAL MEDICINE

## 2021-05-22 PROCEDURE — G0378 HOSPITAL OBSERVATION PER HR: HCPCS

## 2021-05-22 PROCEDURE — 6370000000 HC RX 637 (ALT 250 FOR IP): Performed by: INTERNAL MEDICINE

## 2021-05-22 PROCEDURE — 80061 LIPID PANEL: CPT

## 2021-05-22 PROCEDURE — 97116 GAIT TRAINING THERAPY: CPT

## 2021-05-22 PROCEDURE — 96372 THER/PROPH/DIAG INJ SC/IM: CPT

## 2021-05-22 PROCEDURE — 92611 MOTION FLUOROSCOPY/SWALLOW: CPT | Performed by: SPEECH-LANGUAGE PATHOLOGIST

## 2021-05-22 PROCEDURE — 85027 COMPLETE CBC AUTOMATED: CPT

## 2021-05-22 RX ORDER — ASPIRIN 81 MG/1
81 TABLET ORAL DAILY
Qty: 30 TABLET | Refills: 3 | Status: SHIPPED | OUTPATIENT
Start: 2021-05-23

## 2021-05-22 RX ORDER — ACETAMINOPHEN 500 MG
1000 TABLET ORAL EVERY 6 HOURS PRN
Qty: 120 TABLET | Refills: 3 | Status: SHIPPED | OUTPATIENT
Start: 2021-05-22

## 2021-05-22 RX ORDER — ATORVASTATIN CALCIUM 80 MG/1
40 TABLET, FILM COATED ORAL NIGHTLY
Qty: 30 TABLET | Refills: 3 | Status: SHIPPED | OUTPATIENT
Start: 2021-05-22 | End: 2021-09-28

## 2021-05-22 RX ORDER — ACETAMINOPHEN 325 MG/1
650 TABLET ORAL 2 TIMES DAILY
Status: DISCONTINUED | OUTPATIENT
Start: 2021-05-22 | End: 2021-05-22 | Stop reason: HOSPADM

## 2021-05-22 RX ADMIN — Medication 10 ML: at 08:48

## 2021-05-22 RX ADMIN — ASPIRIN 81 MG: 81 TABLET, COATED ORAL at 08:47

## 2021-05-22 RX ADMIN — ENOXAPARIN SODIUM 40 MG: 40 INJECTION SUBCUTANEOUS at 08:47

## 2021-05-22 RX ADMIN — ACETAMINOPHEN 650 MG: 325 TABLET ORAL at 12:24

## 2021-05-22 ASSESSMENT — PAIN SCALES - GENERAL: PAINLEVEL_OUTOF10: 0

## 2021-05-22 NOTE — PLAN OF CARE
Problem: Falls - Risk of:  Goal: Will remain free from falls  Description: Will remain free from falls  5/22/2021 0940 by Carolyn Heaton RN  Outcome: Ongoing  Note: Call light within reach; bed alarm engaged  5/22/2021 0301 by Jamee Harada, RN  Outcome: Ongoing  Note: Pt is A&Ox4, is high fall risk, Pt educated and encouraged to use call light to notify and wait for assistance from staff before getting OOB, pt uses call light appropriately, has made no unsafe movements, no attempts to get OOB without assistance. Pt's bed alarm remained on throughout shift, bed in lowest position, 2/4 side rails up, call light and bedside table within reach. No falls so far this shift, will continue to monitor. Problem: HEMODYNAMIC STATUS  Goal: Patient has stable vital signs and fluid balance  Outcome: Ongoing  Note: VSS; monitoring labs      Problem: ACTIVITY INTOLERANCE/IMPAIRED MOBILITY  Goal: Mobility/activity is maintained at optimum level for patient  5/22/2021 0301 by Jamee Harada, RN  Outcome: Ongoing  Note: Pt OOB w steady gait, no unsafe movements made. Pt aware to call out for nurse before attempting to get OOB.

## 2021-05-22 NOTE — PLAN OF CARE
Bedside swallow evaluation completed. Please refer to EMR.     Thank you,    Felicita Schultz) [place], [address] [place] [address], [address] [place] [address]; [phone]  Speech-Language Pathologist

## 2021-05-22 NOTE — DISCHARGE SUMMARY
mucosa moist  Neck: No JVD, thyromegaly  CVS: Nml S1S2, no MRG, RRR  Pulm: Clear bilaterally. No crackles/wheezes  Gastrointestinal: Soft, NT/ND, +BS  Musculoskeletal: No edema. Warm  Neuro: No focal deficit. Moves extremity spontaneously. Psychiatry: Appropriate affect. Not agitated. Skin: Warm, dry with normal turgor. No rash        Significant diagnostic studies that may require follow up:  CT HEAD WO CONTRAST    Result Date: 5/21/2021  EXAMINATION: CT OF THE HEAD WITHOUT CONTRAST  5/21/2021 2:42 am TECHNIQUE: CT of the head was performed without the administration of intravenous contrast. Dose modulation, iterative reconstruction, and/or weight based adjustment of the mA/kV was utilized to reduce the radiation dose to as low as reasonably achievable. COMPARISON: None. HISTORY: ORDERING SYSTEM PROVIDED HISTORY: Transient right leg numbness and weakness, continues to have numbness of the right foot FINDINGS: BRAIN/VENTRICLES: There is no acute intracranial hemorrhage, mass effect or midline shift. No abnormal extra-axial fluid collection. The gray-white differentiation is maintained without evidence of an acute infarct. The ventricles are not enlarged. There are nonspecific areas of hypoattenuation within the periventricular and subcortical white matter, which likely represent chronic microvascular ischemic change. ORBITS: The visualized portion of the orbits demonstrate no acute abnormality. SINUSES: The visualized paranasal sinuses and mastoid air cells demonstrate no acute abnormality. SOFT TISSUES/SKULL: No acute abnormality of the visualized skull or soft tissues. No acute intracranial abnormality. MRI LUMBAR SPINE WO CONTRAST    Result Date: 5/21/2021  History: Right leg numbness. MRI of the lumbar spine without contrast. TECHNIQUE: Multiplanar T1 and T2-weighted images were obtained of the lumbar spine without use of contrast. FINDINGS: 5 lumbar vertebral bodies demonstrate normal height. Normal alignment. Mild straightening of the normal curvature. No marrow replacing lesion. No cord signal abnormality identified. No retroperitoneal mass or significant lymphadenopathy. L1-L2: No significant disc herniation. No canal or foraminal narrowing. L2-L3: No significant disc herniation. No canal or foraminal narrowing. L3-L4: Mild facet arthropathy. No significant disc herniation. No canal stenosis. No significant foraminal narrowing. L4-L5: Minimal disc bulge. Mild facet arthropathy. Mild ligamentous hypertrophy. Minimal foraminal narrowing. No canal stenosis. L5-S1: Mild disc bulge. No canal stenosis. Mild facet arthropathy. No foraminal narrowing. 1. Mild lower lumbar degenerative disc disease and facet arthropathy. No evidence of significant central canal narrowing. 2. Mild foraminal narrowing at L4-L5 otherwise no significant foraminal stenosis. XR CHEST PORTABLE    Result Date: 5/21/2021  EXAMINATION: ONE XRAY VIEW OF THE CHEST 5/21/2021 2:17 am COMPARISON: None HISTORY: ORDERING SYSTEM PROVIDED HISTORY: AMS TECHNOLOGIST PROVIDED HISTORY: Reason for exam:->AMS Reason for Exam: Numbness (woke with numbness to RLE) Acuity: Acute Type of Exam: Initial FINDINGS: The lungs are underinflated, resulting in vascular crowding and subsegmental atelectasis. No focal consolidation, pleural effusion or pneumothorax. Cardiac silhouette is enlarged. No acute bony abnormality. Enlarged cardiac silhouette. CTA HEAD NECK W CONTRAST    Result Date: 5/21/2021  EXAMINATION: CTA OF THE HEAD AND NECK WITH CONTRAST 5/21/2021 3:02 am: TECHNIQUE: CTA of the head and neck was performed with the administration of intravenous contrast. Multiplanar reformatted images are provided for review. MIP images are provided for review. Stenosis of the internal carotid arteries measured using NASCET criteria.  Dose modulation, iterative reconstruction, and/or weight based adjustment of the mA/kV was utilized to reduce the radiation dose to as low as reasonably achievable. COMPARISON: None. HISTORY: ORDERING SYSTEM PROVIDED HISTORY: Transient right leg numbness and weakness, continues to have numbness of the right foot FINDINGS: CTA NECK: AORTIC ARCH/ARCH VESSELS: No dissection or arterial injury. No significant stenosis of the brachiocephalic or subclavian arteries. CAROTID ARTERIES: No dissection, arterial injury, or hemodynamically significant stenosis by NASCET criteria. VERTEBRAL ARTERIES: No dissection, arterial injury, or significant stenosis. SOFT TISSUES: The lung apices are clear. No cervical or superior mediastinal lymphadenopathy. The larynx and pharynx are unremarkable. No acute abnormality of the salivary and thyroid glands. BONES: No acute osseous abnormality. CTA HEAD: ANTERIOR CIRCULATION: No significant stenosis of the intracranial internal carotid, anterior cerebral, or middle cerebral arteries. No aneurysm. POSTERIOR CIRCULATION: No significant stenosis of the vertebral, basilar, or posterior cerebral arteries. No aneurysm. OTHER: No dural venous sinus thrombosis on this non-dedicated study. BRAIN: No mass effect or midline shift. No extra-axial fluid collection. The gray-white differentiation is maintained. No flow-limiting arterial stenosis in the head and neck. MRI brain without contrast    Result Date: 5/21/2021  EXAM: MRI BRAIN WO CONTRAST INDICATION:stroke like symptoms COMPARISON: CT head 5/21/2021. TECHNIQUE: Multiplanar, multisequence MR imaging of the head obtained without IV contrast. IV contrast: None. FINDINGS: SINUSES AND OSSEOUS STRUCTURES: Partial opacification of left mastoid air cells. Right mastoid air cells are clear. Partial opacification of the ethmoid sinuses. The remaining paranasal sinuses are clear. Marrow signal is unremarkable.  ORBITS: Unremarkable MIDLINE STRUCTURES: The sella turcica and pituitary gland are normal. Craniovertebral alignment and cerebellar tonsils are normal. VENTRICLES: Normal size and configuration for patient age. Cisternal spaces are intact. INTRACRANIAL HEMORRHAGE: Focal areas of susceptibility identified in the posterior right frontal region along the cortex on axial image 46 of series 9. 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 No evidence of intracranial hemorrhage otherwise. BRAIN PARENCHYMA: No diffusion signal abnormality identified. At the site of the susceptibility artifact there is a small focal area of FLAIR signal abnormality presumably related to remote infarct. Additional nonspecific periventricular white matter signal abnormality is favoring chronic small vessel ischemic disease. No suspicious abnormality otherwise. . INTRACRANIAL VASCULATURE: Vascular flow voids are intact. 1. No acute intracranial abnormality. No evidence of acute ischemia. 2. Mild left mastoid air cell disease. 3. Focal area of susceptibility artifact with adjacent abnormal FLAIR signal presumably related to a remote small hemorrhagic infarct. 4. Nonspecific periventricular and subcortical white matter signal abnormalities presumably related to chronic small vessel ischemic disease and/or age related degenerative change. Treatments: As above.       Discharge Medications:     Medication List      START taking these medications    acetaminophen 500 MG tablet  Commonly known as: APAP Extra Strength  Take 2 tablets by mouth every 6 hours as needed for Pain     aspirin 81 MG EC tablet  Take 1 tablet by mouth daily     atorvastatin 80 MG tablet  Commonly known as: LIPITOR  Take 0.5 tablets by mouth nightly        STOP taking these medications    ibuprofen 400 MG tablet  Commonly known as: ADVIL;MOTRIN           Where to Get Your Medications      These medications were sent to [address] [place] [address] [phone]  [facility], [facility]    Phone: [phone]   · acetaminophen 500 MG tablet  · aspirin 81 MG EC tablet  · atorvastatin 80 MG tablet         Activity: activity as tolerated  Diet: No diet orders on file      Disposition: home  Discharged Condition: Stable  Follow Up:   [address] [facility] [phone]  Schedule an appointment as soon as possible for a visit in 1 month  TIA Follow up     [address] [facility] [phone]  In 4 weeks  If right leg symptoms persist        Code status:  Prior         Total time spent on discharge, finalizing medications, referrals and arranging outpatient follow up was more than 45 minutes      Thank you Dr. Amando Rodriguez, APRN - CNP for the opportunity to be involved in this patients care.

## 2021-05-22 NOTE — PROGRESS NOTES
Patient is Aox4 and able to make needs known; compliant with all medications as ordered; VSS; RRR; bowel sounds audible 4q; continues to complain about 'bad' pain in RLE, motor intact; neuro assessment unchanged; ambulates with steady gait; compliant with all medications as ordered; tolerating diet; call light within reach; bed alarm engaged; bed in lowest position; will continue to monitor.      Electronically signed by Carolyn Heaton RN on 5/22/2021 at 10:18 AM

## 2021-05-22 NOTE — PROGRESS NOTES
VSS, a/o x4. NSR on telemetry monitoring. Pt had MRI early in night. Pt reports R leg improving- no numbness @ this time. Neuro checks intact. OOB w steady gait. No unsafe movements made. Resting comfortably overnight. Bed remains in lowest position, call light within reach. Calls out appropriately when needing assistance. All needs met.  Will cont to monitor

## 2021-05-22 NOTE — PLAN OF CARE
Problem: Falls - Risk of:  Goal: Will remain free from falls  Description: Will remain free from falls  5/22/2021 1343 by Nikki Oquendo RN  Outcome: Completed  5/22/2021 0940 by Nikki Oquendo RN  Outcome: Ongoing  Note: Call light within reach; bed alarm engaged  5/22/2021 0301 by Lian Thrasher RN  Outcome: Ongoing  Note: Pt is A&Ox4, is high fall risk, Pt educated and encouraged to use call light to notify and wait for assistance from staff before getting OOB, pt uses call light appropriately, has made no unsafe movements, no attempts to get OOB without assistance. Pt's bed alarm remained on throughout shift, bed in lowest position, 2/4 side rails up, call light and bedside table within reach. No falls so far this shift, will continue to monitor. Goal: Absence of physical injury  Description: Absence of physical injury  Outcome: Completed     Problem: HEMODYNAMIC STATUS  Goal: Patient has stable vital signs and fluid balance  5/22/2021 1343 by Nikki Oquendo RN  Outcome: Completed  5/22/2021 0940 by Nikki Oquendo RN  Outcome: Ongoing  Note: VSS; monitoring labs      Problem: ACTIVITY INTOLERANCE/IMPAIRED MOBILITY  Goal: Mobility/activity is maintained at optimum level for patient  5/22/2021 1343 by Nikki Oquendo RN  Outcome: Completed  5/22/2021 0301 by Lian Thrasher RN  Outcome: Ongoing  Note: Pt OOB w steady gait, no unsafe movements made. Pt aware to call out for nurse before attempting to get OOB.       Problem: COMMUNICATION IMPAIRMENT  Goal: Ability to express needs and understand communication  Outcome: Completed

## 2021-05-22 NOTE — PLAN OF CARE
Problem: Falls - Risk of:  Goal: Will remain free from falls  Description: Will remain free from falls  Outcome: Ongoing  Note: Pt is A&Ox4, is high fall risk, Pt educated and encouraged to use call light to notify and wait for assistance from staff before getting OOB, pt uses call light appropriately, has made no unsafe movements, no attempts to get OOB without assistance. Pt's bed alarm remained on throughout shift, bed in lowest position, 2/4 side rails up, call light and bedside table within reach. No falls so far this shift, will continue to monitor. Problem: ACTIVITY INTOLERANCE/IMPAIRED MOBILITY  Goal: Mobility/activity is maintained at optimum level for patient  Outcome: Ongoing  Note: Pt OOB w steady gait, no unsafe movements made. Pt aware to call out for nurse before attempting to get OOB.

## 2021-05-22 NOTE — CARE COORDINATION
Case Management Assessment            Discharge Note                    Date / Time of Note: 5/22/2021 11:52 AM                  Discharge Note Completed by: Susanna Haile RN    Patient Name: Nayely Zuniga   YOB: 1955  Diagnosis: TIA (transient ischemic attack) [G45.9]  Stroke-like symptom [R29.90]   Date / Time: 5/21/2021  8:06 AM    Current PCP: No primary care provider on file. Clinic patient: No    Hospitalization in the last 30 days: No    Advance Directives:  Code Status: Full Code  [place] DNR form completed and on chart: Not Indicated    Financial:  Payor: MEDICARE / Plan: MEDICARE PART A AND B / Product Type: *No Product type* /      Pharmacy:    [facility], [address] [facility] [phone] [facility] [phone] [address]  Phone: [phone] Fax: [phone]      Assistance purchasing medications?: Potential Assistance Purchasing Medications: No  Assistance provided by Case Management: None at this time    Does patient want to participate in local refill/ meds to beds program?: Not Assessed    Meds To Beds General Rules:  1. Can ONLY be done Monday- Friday between 8:30am-5pm  2. Prescription(s) must be in pharmacy by 3pm to be filled same day  3. Copy of patient's insurance/ prescription drug card and patient face sheet must be sent along with the prescription(s)  4. Cost of Rx cannot be added to hospital bill. If financial assistance is needed, please contact unit  or ;  or  CANNOT provide pharmacy voucher for patients co-pays  5.  Patients can then  the prescription on their way out of the hospital at discharge, or pharmacy can deliver to the bedside if staff is available. (payment due at time of pick-up or delivery - cash, check, or card accepted)     Able to afford home medications/ co-pay costs: Yes    ADLS:  Current PT AM-PAC Score:   /24  Current OT AM-PAC Score: 24 /24      DISCHARGE Disposition: Home- No Services Needed    LOC at discharge: Not Applicable  NATHAN Completed: Not Indicated    Notification completed in HENS/PAS?:  Not Applicable    IMM Completed:   Not Indicated    Transportation:  Transportation PLAN for discharge: family   Mode of Transport: [address] ordered at discharge: Not [address]: Not Applicable  Orders faxed: No    Durable Medical Equipment:  DME Provider: none  Equipment obtained during hospitalization:     Home Oxygen and Respiratory Equipment:  Oxygen needed at discharge?: Not [address]: Not Applicable  Portable tank available for discharge?: Not Indicated    Dialysis:  Dialysis patient: No    Dialysis Center:  Not Applicable      Additional CM Notes: Pt from home with wife, will return home with wife no needs, will follow up out pt with PCP    The Plan for Transition of Care is related to the following treatment goals of TIA (transient ischemic attack) [G45.9]  Stroke-like symptom [R29.90]    The Patient and/or patient representative Daniel and his family were provided with a choice of provider and agrees with the discharge plan Yes    Freedom of choice list was provided with basic dialogue that supports the patient's individualized plan of care/goals and shares the quality data associated with the providers.  Not Indicated    Care Transitions patient: No    Levan Claude, RN  [facility] [employer]  Case Management Department  Ph: [phone]  Fax: [phone]

## 2021-05-22 NOTE — PROGRESS NOTES
Speech Language Pathology  Facility/Department: [address]   CLINICAL BEDSIDE SWALLOW EVALUATION/treatment/discharge    NAME: Aguilar Welch  : 1955  MRN: [id number]    ADMISSION DATE: 2021  ADMITTING DIAGNOSIS: has TIA (transient ischemic attack); Low back pain; HTN (hypertension); Smoking; and Other hyperlipidemia on their problem list.  ONSET DATE: 21    Recent Chest Xray/CT of Chest: (21)  Impression   Enlarged cardiac silhouette. MRI Brain 21  Impression       1. No acute intracranial abnormality. No evidence of acute ischemia. 2. Mild left mastoid air cell disease. 3. Focal area of susceptibility artifact with adjacent abnormal FLAIR signal presumably related to a remote small hemorrhagic infarct. 4. Nonspecific periventricular and subcortical white matter signal abnormalities presumably related to chronic small vessel ischemic disease and/or age related degenerative change. Date of Eval: 2021  Evaluating Therapist: DANA Sahu    Current Diet level:  Current Diet : Regular  Current Liquid Diet : Thin      Primary Complaint  Patient Complaint: Would like to go home    Pain:  Pain Assessment  Pain Assessment: 0-10  Pain Level: 0  Patient's Stated Pain Goal: No pain    Reason for Referral  Aguilar Welch was referred for a bedside swallow evaluation to assess the efficiency of his swallow function, identify signs and symptoms of aspiration and make recommendations regarding safe dietary consistencies, effective compensatory strategies, and safe eating environment. Impression  Dysphagia Diagnosis: Swallow function appears grossly intact  Dysphagia Impression : Oropharyngeal swallow appears grossly WFL at this time; adequate clearance of oral cavity despite edentulous nature and no overt s/s of aspiration w/ any trials, including sequential swallow of thin liquids.  Pt endorsed consuming regular diet at home, but does eat \"tender\" meats cut into small pieces; feels confident in ability to chose items that are easy for mastication. Recommend ongoing regular + thin diet at this time; pills PO. No acute dysphagia needs. Dysphagia Outcome Severity Scale: Level 6: Within functional limits/Modified independence     Treatment Plan  Requires SLP Intervention: No  Duration/Frequency of Treatment: No acute dysphagia needs  D/C Recommendations: Home with intermittent assistance       Recommended Diet and Intervention  Diet Solids Recommendation: Regular  Liquid Consistency Recommendation: Thin  Recommended Form of Meds: PO     Therapeutic Interventions: Patient/Family education    Compensatory Swallowing Strategies  Compensatory Swallowing Strategies: Remain upright for 30-45 minutes after meals;Upright as possible for all oral intake    Treatment/Goals  1- The patient/caregiver will demonstrate understanding of compensatory strategies for improved swallowing safety. 5/22: Educated to overt s/s of aspiration and to alert RN should they emerge. Pt expressed understanding. GOAL MET    General  Chart Reviewed: Yes  Comments: This is a 58-year-old male with a past medical history of legally blind, hyperlipidemia, hypertension who is presenting today from outside hospital with right leg weakness and numbness that started 5/20/21. MRI w/o acute abnormality. Subjective  Subjective: Pt supine in bed upon arrival; resting comfortably on room air. AAOx4. Endorsed no difficulties w/ swallow at this time. Aching leg pain in L calf; rated 2/10. Agreeable to evaluation. Behavior/Cognition: Alert; Cooperative;Pleasant mood  Temperature Spikes Noted: No  Respiratory Status: Room air  Breath Sounds: Clear  O2 Device: None (Room air)  Communication Observation: Functional  Follows Directions: Complex  Dentition: Edentulous (Does not wear dentures)  Patient Positioning: Upright in bed  Baseline Vocal Quality: Normal  Volitional Cough: Strong  Prior Dysphagia History: None per chart review or pt report  Consistencies Administered: Reg solid; Dysphagia Pureed (Dysphagia I); Thin - teaspoon; Thin - cup; Thin - straw; Ice Chips      Vision/Hearing  Vision  Vision: Impaired  Vision Exceptions: Legally blind  Hearing  Hearing: Within functional limits    Oral Motor Deficits  Oral/Motor  Oral Motor: Within functional limits (Noted to have slight bulging of buccal muscle on L into oral cavity; tissue appears smooth and no firm masses noted upon palpation.)    Oral Phase Dysfunction  Oral Phase  Oral Phase: WFL  Oral Phase  Oral Phase - Comment: Oral phase grossly WFLs; timely mastication and clearance of oral cavity despite edentulous nature. Pt endorsed consuming regular diet at home w/ exception of meats; makes sure they're tender and cuts into small pieces to assist w/ mastication. Indicators of Pharyngeal Phase Dysfunction   Pharyngeal Phase  Pharyngeal Phase: WFL  Pharyngeal Phase   Pharyngeal: Pharyngeal phase appears grossly WFLs; palpable laryngeal movement and no overt s/s of aspiration w/ any trials. Completed sequential swallow via cup and straw w/o difficulties; independently completed 3oz water test d/t feeling \"thirsty\". No changes in respirations or vocal quality. Prognosis  Prognosis  Prognosis for safe diet advancement: good  Individuals consulted  Consulted and agree with results and recommendations: Patient;RN    Education  Patient Education: Educated pt to reasoning behind BSE, observations, and diet recommendation  Patient Education Response: Verbalizes understanding  Safety Devices in place: Yes  Type of devices: Call light within reach; Left in bed       Therapy Time  SLP Individual Minutes  Time In: 3121  Time Out: 0830  Minutes: 15     SLP Total Treatment Time  Timed Code Treatment Minutes: 0 Minutes  Total Treatment Time: 13    Thank you,    Pedro Thakkar) [place], [place], [address] [facility]; .[phone]  Speech-Language Pathologist  Pager #: [phone]

## 2021-05-22 NOTE — PROGRESS NOTES
Physical Therapy    Facility/Department: [facility] SURGERY  Initial Assessment/Treatment/DC    NAME: Michelle Abbott  : 1955  MRN: [id number]    Date of Service: 2021    Discharge Recommendations: Michelle Abbott scored a 22/24 on the AM-PAC short mobility form. At this time, no further PT is recommended upon discharge due to pt ambulating independently. Recommend patient returns to prior setting with prior services. PT Equipment Recommendations  Equipment Needed: No    Assessment   Assessment: 61 yo admitted after RLE became numb & gave out on pt causing him to fall. Demo steady gait in hook today without LOB. Up/down stairs without difficulty. Pt feels RLE is slightly weaker than L- did not notice any abnormality on MMT but did show pt some LE exercises he can do at home. No further IP PT needs & anticipate dc home today with A from family. Decision Making: Low Complexity  PT Education: Home Exercise Program  Patient Education: standing heel/toe raises, hip abd while holding onto sink/countertop for support- verbalized understanding  REQUIRES PT FOLLOW UP: No  Activity Tolerance  Activity Tolerance: Patient Tolerated treatment well       Patient Diagnosis(es): The encounter diagnosis was TIA (transient ischemic attack). has a past medical history of Blind in both eyes, Hyperlipidemia, and Hypertension. has a past surgical history that includes eye surgery. Restrictions  Restrictions/Precautions  Restrictions/Precautions: Up as Tolerated  Position Activity Restriction  Other position/activity restrictions: up as tolerated; legally blind  Vision/Hearing  Vision: Impaired  Vision Exceptions: Legally blind  Hearing: Within functional limits     Subjective  General  Chart Reviewed: Yes  Additional Pertinent Hx: Adm - 60 y.o. male  has a past medical history of Blind in both eyes, Hyperlipidemia, and Hypertension. who presents to the ED for right leg weakness and numbness.   RLE gave out & caused pt to fall. Head CT (-). L Spine MRI:Mild lower lumbar degenerative disc disease and facet arthropathy. No evidence of significant central canal narrowing. MRI brain:  (-) acute. Family / Caregiver Present: No  Referring Practitioner: Summer Wade MD  Diagnosis: TIA  Follows Commands: Within Functional Limits  Subjective  Subjective: Found in bed, agreeable to PT. Hopes to go home today. Reports feeling that RLE is slightly weaker than usual.  Reports he gets cramps in R calf & has discussed with doctor. Pain Screening  Patient Currently in Pain: Slight R buttock pain <4         Orientation  Orientation  Overall Orientation Status: Within Normal Limits  Social/Functional History  Social/Functional History  Lives With: Spouse, Son  Type of Home: Trailer  Home Layout: One level  Home Access: Stairs to enter with rails  Entrance Stairs - Number of Steps: 3  Bathroom Shower/Tub: Tub/Shower unit  Bathroom Toilet: Standard  Home Equipment:  (white cane)  ADL Assistance: Independent  Homemaking Assistance: Independent  Ambulation Assistance: Independent  Transfer Assistance: [address]: listen to books  Additional Comments: Legally blind. Uses sight cane for mobility in community; no AD for mobility in the home. Pt denies any falls. Wife can be home with him at [place] & son will be there as well. Objective          AROM RLE (degrees)  RLE AROM: WNL  AROM LLE (degrees)  LLE AROM : WNL  Strength RLE  Strength RLE: WFL  Strength LLE  Strength LLE: WFL        Bed mobility  Supine to Sit: Independent  Sit to Supine: Independent  Transfers  Sit to Stand: Independent  Stand to sit:  Independent  Ambulation  Ambulation?: Yes  Ambulation 1  Surface: level tile  Device: No Device  Assistance: Supervision  Quality of Gait: steady gait, no LOB  Distance: 150', 120'  Stairs/Curb  Stairs?: Yes (up/down 5 steps with rails SBA)     Balance  Sitting - Static: Good  Sitting - Dynamic: Good Standing - Static: Good  Standing - Dynamic: Good  Put pants on independently during session for dc home. Treatment included gait/transfer training, pt education.       Plan   Safety Devices  Type of devices: Call light within reach, Nurse notified, Left in bed                                                      AM-PAC Score  AM-PAC Inpatient Mobility Raw Score : 22 (05/22/21 1431)  AM-PAC Inpatient T-Scale Score : 53.28 (05/22/21 1431)  Mobility Inpatient CMS 0-100% Score: 20.91 (05/22/21 1431)  Mobility Inpatient CMS G-Code Modifier : CJ (05/22/21 1431)          Goals  Short term goals  Time Frame for Short term goals: N/A due to independence with mobility &dc home today       Therapy Time   Individual Concurrent Group Co-treatment   Time In 1350         Time Out 1415         Minutes 25           Timed Code Treatment Minutes:   14    Total Treatment Minutes:  [address], [address] [facility]

## 2021-06-11 ENCOUNTER — HOSPITAL ENCOUNTER (OUTPATIENT)
Dept: VASCULAR LAB | Age: 66
Discharge: HOME | End: 2021-06-11
Payer: MEDICARE

## 2021-06-11 ENCOUNTER — HOSPITAL ENCOUNTER (OUTPATIENT)
Dept: NON INVASIVE DIAGNOSTICS | Age: 66
Discharge: HOME | End: 2021-06-11
Payer: MEDICARE

## 2021-06-11 DIAGNOSIS — I73.9 CLAUDICATION (HCC): ICD-10-CM

## 2021-06-11 DIAGNOSIS — G45.9 TIA (TRANSIENT ISCHEMIC ATTACK): ICD-10-CM

## 2021-06-11 LAB
LV EF: 43 %
LVEF MODALITY: NORMAL

## 2021-06-11 PROCEDURE — 93306 TTE W/DOPPLER COMPLETE: CPT

## 2021-06-11 PROCEDURE — 93925 LOWER EXTREMITY STUDY: CPT

## 2021-06-18 ENCOUNTER — OFFICE VISIT (OUTPATIENT)
Dept: VASCULAR SURGERY | Age: 66
End: 2021-06-18
Payer: MEDICARE

## 2021-06-18 VITALS
SYSTOLIC BLOOD PRESSURE: 142 MMHG | WEIGHT: 226 LBS | DIASTOLIC BLOOD PRESSURE: 80 MMHG | BODY MASS INDEX: 34.25 KG/M2 | HEIGHT: 68 IN

## 2021-06-18 DIAGNOSIS — M79.606 ISCHEMIC REST PAIN OF LOWER EXTREMITY: ICD-10-CM

## 2021-06-18 DIAGNOSIS — I99.8 ISCHEMIC REST PAIN OF LOWER EXTREMITY: ICD-10-CM

## 2021-06-18 DIAGNOSIS — I70.218 CLAUDICATION OF BOTH UPPER EXTREMITIES DUE TO ATHEROSCLEROSIS (HCC): ICD-10-CM

## 2021-06-18 PROCEDURE — G8427 DOCREV CUR MEDS BY ELIG CLIN: HCPCS | Performed by: SURGERY

## 2021-06-18 PROCEDURE — G8417 CALC BMI ABV UP PARAM F/U: HCPCS | Performed by: SURGERY

## 2021-06-18 PROCEDURE — 99203 OFFICE O/P NEW LOW 30 MIN: CPT | Performed by: SURGERY

## 2021-06-18 RX ORDER — LANOLIN ALCOHOL/MO/W.PET/CERES
1000 CREAM (GRAM) TOPICAL DAILY
COMMUNITY
End: 2021-08-30

## 2021-06-18 NOTE — PROGRESS NOTES
 Number of children: Not on file    Years of education: Not on file    Highest education level: Not on file   Occupational History    Not on file   Tobacco Use    Smoking status: Current Every Day Smoker     Packs/day: 1.00     Types: Cigarettes    Smokeless tobacco: Never Used   Substance and Sexual Activity    Alcohol use: No    Drug use: No    Sexual activity: Not on file   Other Topics Concern    Not on file   Social History Narrative    Not on file     Social Determinants of Health     Financial Resource Strain:     Difficulty of Paying Living Expenses:    Food Insecurity:     Worried About Running Out of Food in the Last Year:     [address] in the Last Year:    Transportation Needs:     Lack of Transportation (Medical):  Lack of Transportation (Non-Medical):    Physical Activity:     Days of Exercise per Week:     Minutes of Exercise per Session:    Stress:     Feeling of Stress :    Social Connections:     Frequency of Communication with Friends and Family:     Frequency of Social Gatherings with Friends and Family:     Attends [employer] Services:     Active Member of Clubs or Organizations:     Attends Club or Organization Meetings:     Marital Status:    Intimate Partner Violence:     Fear of Current or Ex-Partner:     Emotionally Abused:     Physically Abused:     Sexually Abused:        Family History:    History reviewed. No pertinent family history. Review of Systems:  A 14 point review of systems was completed. Pertinent positives identified in the HPI, all other review of systems negative. Physical Examination:    BP (!) 142/80 (Site: Left Upper Arm)   Ht 5' 8\" (1.727 m)   Wt 226 lb (102.5 kg)   BMI 34.36 kg/m²     Weight: 226 lb (102.5 kg)       General appearance: NAD  Eyes: PERRLA  Neck: no JVD, no lymphadenopathy. Respiratory: effort is unlabored, no crackles, wheezes or rubs. Cardiovascular: regular, no murmur. No carotid bruits.    Pulses: radial femoral DP PT   RIGHT 2 - doppler doppler   LEFT 2 2 doppler doppler   GI: abdomen soft, nondistended, no organomegaly. Musculoskeletal: strength and tone normal.  Extremities: slight rubor of right foot  Neuro/psychiatric: grossly intact. MEDICAL DECISION MAKING/TESTING  Arterial duplex:    Impressions   Right Impression   The right ankle/brachial index is 0.33 (the DP is inaudible and the PT is 46   mmHg). The presence of monophasic waveforms at the common femoral artery level may   indicate aorto-iliac inflow. There is atherosclerotic plaque seen within the femoral popliteal segment   consistent with a < 50% stenosis. The superficial femoral artery appears occluded at the mid/distal segment with   reconstitution of flow at the distal level. The distal anterior tibial and posterior tibial arteries appear patent. The peroneal artery appears patent. Left Impression   The left ankle/brachial index is 0.56 (the DP is 71 and the PT is 79 mmHg). The presence of triphasic waveforms at the common femoral artery level   suggests no aorto-iliac inflow disease. There is atherosclerotic plaque seen within the femoral popliteal artery   segment consistent with a < 50% stenosis. The superficial femoral artery appears occluded at the mid through distal   segment with reconstitution of flow at the proximal popliteal artery. The distal anterior tibial artery appears patent. The distal posterior tibial artery appears patent, however, flow reversal is   noted indicating a proximal obstruction. The peroneal artery appears patent, however, flow reversal is noted indicating   a proximal obstruction. There are no prior studies for comparison.       Conclusions        Summary        There is moderate to severe arterial insufficiency noted within the    bilateral lower extremities.   Dorothye Hero is bilateral multi-level and inflow disease.    There is bilateral occlusions of the SFA.            Assessment: Diagnosis Orders   1. Claudication of both upper extremities due to atherosclerosis ([address]     2. Ischemic rest pain of lower extremity           Recommendations/Plan: Aortogram with BLE angiogram, possible RLE intervention. I have discussed all risks (including but not limited to bleeding, thrombosis, contrast allergy, renal failure, and early and late failure of intervention), benefits and alternatives of catheter-based angiography. Patient freely consents and is eager to proceed. All questions and expectations addressed.       Tim Zambrano MD, MD, FACS

## 2021-06-22 ENCOUNTER — HOSPITAL ENCOUNTER (OUTPATIENT)
Dept: CARDIAC CATH/INVASIVE PROCEDURES | Age: 66
Discharge: HOME | End: 2021-06-22
Attending: SURGERY | Admitting: SURGERY
Payer: MEDICARE

## 2021-06-22 VITALS — BODY MASS INDEX: 35 KG/M2 | WEIGHT: 223 LBS | HEIGHT: 67 IN

## 2021-06-22 LAB
ANION GAP SERPL CALCULATED.3IONS-SCNC: 10 MMOL/L (ref 3–16)
BASE EXCESS VENOUS: 1 (ref -3–3)
BUN BLDV-MCNC: 6 MG/DL (ref 7–20)
CALCIUM IONIZED: 1.2 MMOL/L (ref 1.12–1.32)
CALCIUM SERPL-MCNC: 9.9 MG/DL (ref 8.3–10.6)
CHLORIDE BLD-SCNC: 97 MMOL/L (ref 99–110)
CO2: 26 MMOL/L (ref 21–32)
CREAT SERPL-MCNC: 0.9 MG/DL (ref 0.8–1.3)
EKG ATRIAL RATE: 77 BPM
EKG DIAGNOSIS: NORMAL
EKG P AXIS: 49 DEGREES
EKG P-R INTERVAL: 160 MS
EKG Q-T INTERVAL: 392 MS
EKG QRS DURATION: 88 MS
EKG QTC CALCULATION (BAZETT): 443 MS
EKG R AXIS: 26 DEGREES
EKG T AXIS: 46 DEGREES
EKG VENTRICULAR RATE: 77 BPM
GFR AFRICAN AMERICAN: >60
GFR AFRICAN AMERICAN: >60
GFR NON-AFRICAN AMERICAN: >60
GFR NON-AFRICAN AMERICAN: >60
GLUCOSE BLD-MCNC: 100 MG/DL (ref 70–99)
GLUCOSE BLD-MCNC: 99 MG/DL (ref 70–99)
HCO3 VENOUS: 27.4 MMOL/L (ref 23–29)
HCT VFR BLD CALC: 47.7 % (ref 40.5–52.5)
HEMOGLOBIN: 16.5 G/DL (ref 13.5–17.5)
HEMOGLOBIN: 18.4 GM/DL (ref 13.5–17.5)
INR BLD: 1.1 (ref 0.86–1.14)
MCH RBC QN AUTO: 34.9 PG (ref 26–34)
MCHC RBC AUTO-ENTMCNC: 34.5 G/DL (ref 31–36)
MCV RBC AUTO: 100.9 FL (ref 80–100)
O2 SAT, VEN: 24 %
PCO2, VEN: 51.8 MM HG (ref 40–50)
PDW BLD-RTO: 14 % (ref 12.4–15.4)
PERFORMED ON: ABNORMAL
PH VENOUS: 7.33 (ref 7.35–7.45)
PLATELET # BLD: 218 K/UL (ref 135–450)
PMV BLD AUTO: 7.4 FL (ref 5–10.5)
PO2, VEN: 18 MM HG
POC CHLORIDE: 106 MMOL/L (ref 99–110)
POC CREATININE: 1.1 MG/DL (ref 0.8–1.3)
POC HEMATOCRIT: 54 % (ref 40.5–52.5)
POC POTASSIUM: 3.7 MMOL/L (ref 3.5–5.1)
POC SAMPLE TYPE: ABNORMAL
POC SODIUM: 142 MMOL/L (ref 136–145)
POTASSIUM REFLEX MAGNESIUM: 3.7 MMOL/L (ref 3.5–5.1)
PROTHROMBIN TIME: 12.8 SEC (ref 10–13.2)
RBC # BLD: 4.73 M/UL (ref 4.2–5.9)
SODIUM BLD-SCNC: 133 MMOL/L (ref 136–145)
TCO2 CALC VENOUS: 29 MMOL/L
WBC # BLD: 6.8 K/UL (ref 4–11)

## 2021-06-22 PROCEDURE — C1760 CLOSURE DEV, VASC: HCPCS

## 2021-06-22 PROCEDURE — 84132 ASSAY OF SERUM POTASSIUM: CPT

## 2021-06-22 PROCEDURE — 99152 MOD SED SAME PHYS/QHP 5/>YRS: CPT | Performed by: SURGERY

## 2021-06-22 PROCEDURE — C1769 GUIDE WIRE: HCPCS

## 2021-06-22 PROCEDURE — 82330 ASSAY OF CALCIUM: CPT

## 2021-06-22 PROCEDURE — 99152 MOD SED SAME PHYS/QHP 5/>YRS: CPT

## 2021-06-22 PROCEDURE — 36200 PLACE CATHETER IN AORTA: CPT | Performed by: SURGERY

## 2021-06-22 PROCEDURE — 6360000002 HC RX W HCPCS

## 2021-06-22 PROCEDURE — 93005 ELECTROCARDIOGRAM TRACING: CPT | Performed by: SURGERY

## 2021-06-22 PROCEDURE — 82435 ASSAY OF BLOOD CHLORIDE: CPT

## 2021-06-22 PROCEDURE — 2709999900 HC NON-CHARGEABLE SUPPLY: Performed by: SURGERY

## 2021-06-22 PROCEDURE — 84295 ASSAY OF SERUM SODIUM: CPT

## 2021-06-22 PROCEDURE — 85014 HEMATOCRIT: CPT

## 2021-06-22 PROCEDURE — 82947 ASSAY GLUCOSE BLOOD QUANT: CPT

## 2021-06-22 PROCEDURE — 75716 ARTERY X-RAYS ARMS/LEGS: CPT | Performed by: SURGERY

## 2021-06-22 PROCEDURE — 75625 CONTRAST EXAM ABDOMINL AORTA: CPT | Performed by: SURGERY

## 2021-06-22 PROCEDURE — G0269 OCCLUSIVE DEVICE IN VEIN ART: HCPCS

## 2021-06-22 PROCEDURE — 75625 CONTRAST EXAM ABDOMINL AORTA: CPT

## 2021-06-22 PROCEDURE — 82803 BLOOD GASES ANY COMBINATION: CPT

## 2021-06-22 PROCEDURE — 93010 ELECTROCARDIOGRAM REPORT: CPT | Performed by: INTERNAL MEDICINE

## 2021-06-22 PROCEDURE — 82565 ASSAY OF CREATININE: CPT

## 2021-06-22 PROCEDURE — 2500000003 HC RX 250 WO HCPCS

## 2021-06-22 PROCEDURE — 85610 PROTHROMBIN TIME: CPT

## 2021-06-22 PROCEDURE — 76937 US GUIDE VASCULAR ACCESS: CPT | Performed by: SURGERY

## 2021-06-22 PROCEDURE — 36200 PLACE CATHETER IN AORTA: CPT

## 2021-06-22 PROCEDURE — 2709999900 HC NON-CHARGEABLE SUPPLY

## 2021-06-22 PROCEDURE — 80048 BASIC METABOLIC PNL TOTAL CA: CPT

## 2021-06-22 PROCEDURE — 85027 COMPLETE CBC AUTOMATED: CPT

## 2021-06-22 PROCEDURE — 75716 ARTERY X-RAYS ARMS/LEGS: CPT

## 2021-06-22 RX ORDER — FENTANYL CITRATE 50 UG/ML
INJECTION, SOLUTION INTRAMUSCULAR; INTRAVENOUS
Status: COMPLETED | OUTPATIENT
Start: 2021-06-22 | End: 2021-06-22

## 2021-06-22 RX ORDER — MIDAZOLAM HYDROCHLORIDE 5 MG/ML
INJECTION INTRAMUSCULAR; INTRAVENOUS
Status: COMPLETED | OUTPATIENT
Start: 2021-06-22 | End: 2021-06-22

## 2021-06-22 RX ADMIN — MIDAZOLAM HYDROCHLORIDE 2 MG: 5 INJECTION INTRAMUSCULAR; INTRAVENOUS at 14:14

## 2021-06-22 RX ADMIN — FENTANYL CITRATE 50 MCG: 50 INJECTION, SOLUTION INTRAMUSCULAR; INTRAVENOUS at 14:14

## 2021-06-22 RX ADMIN — FENTANYL CITRATE 50 MCG: 50 INJECTION, SOLUTION INTRAMUSCULAR; INTRAVENOUS at 14:36

## 2021-06-22 NOTE — H&P
History and Physical / Pre-Sedation Assessment    Patient:  Maciel Rodriguez. :   1955    Intended Procedure: Aortogram with BLE angiogram, poss RLE intervention    HPI: Severe claudication/RLE ischemic rest pain  Nurses notes reviewed and agreed. Medications reviewed  Allergies:   Patient has no known allergies. Physical Exam:   CURRENT VITALS:  There were no vitals taken for this visit. Airway:Normal  Cardiac:Normal  Pulmonary:Normal  Abdomen:Normal        Pre-Procedure Assessment/Plan:  ASA 2 - Patient with mild systemic disease with no functional limitations    Mallampati Airway Assessment:  Mallampati Class II - (soft palate, fauces & uvula are visible)    Level of Sedation Plan: Moderate sedation    Post Procedure plan: Return to same level of care    I assessed the patient and find that the patient is in satisfactory condition to proceed with the planned procedure and sedation plan. I have explained the risk, benefits, and alternatives to the procedure. The patient understands and agrees to proceed.   Yes    Erin Cook

## 2021-06-23 NOTE — PROGRESS NOTES
Daniel Hodge [place]. Age 60 y.o.    male    1955    MRN [id number]    6/28/2021  Arrival Time_____________  OR Time____________175 Min     Procedure(s): FEMORAL TO FEMORAL BYPASS GRAFT WITH RIGHT SUPERFICIAL FEMORAL ARTERY ANGIOPLASTY AND STENTING                      General     Surgeon(s):  Merly Melendrez, MD      DAY ADMIT ___  SDS/OP ___  OUTPT IN BED ___         Phone [phone] (home) [phone] (work)   PCP _____________________ Phone_________________ [address] ( ) Epic CE ( ) Appt ________    ADDITIONAL INFO __________________________________ Cardio/Consult _____________    NOTES _____________________________________________________________________    ____________________________________________________________________________    PAT APPT DATE:________ TIME: ________  FAXED QAD: _______  (__) H&P w/ hospitalist  ____________________________________________________________________________    COVID TEST: Date/Location______________        NURSING HISTORY COMPLETE: _______  (__) CBC       (__) W/ DIFF ___________  (__)  ECHO    __________  (__) Hgb A1C    ___________  (__) CHEST X RAY   __________  (__) LIPID PROFILE  ___________  (__) EKG   __________  (__) PT/PTT   ___________  (__) PFT's   __________  (__) BMP   ___________  (__) CAROTIDS  __________  (__) CMP   ___________  (__) VEIN MAPPING  __________  (__) U/A   ___________  (__) HISTORY & PHYSICAL __________  (__) URINE C & S  ___________  (__) CARDIAC CLEARANCE __________  (__) U/A W/ FLEX  ___________  (__) PULM.  CLEARANCE __________  (__) SERUM PREGNANCY ___________  (__) Check Epic DOS orders __________  (__) TYPE & SCREEN ________ repeat ( ) (__)  __________________ __________  (__) ALBUMIN   ___________  (__)  __________________ __________  (__) TRANSFERRIN  ___________  (__)  __________________ __________  (__) LIVER PROFILE  ___________  (__)  __________________ __________  (__) CARBOXY HGB  ___________  (__) URINE PREG DOS __________  (__) NICOTINE & MET.  ___________  (__) BLOOD SUGAR DOS __________  (__) PREALBUMIN  ___________    (__) MRSA NASAL SWAB ___________  (__) BLOOD THINNERS __________  (__) ACE/ ARBS: _____________________    (__) BETABLOCKERS ___________________

## 2021-06-23 NOTE — OP NOTE
[facility], [place] [address]                                OPERATIVE REPORT    PATIENT NAME: Marnie Teague                      :        1955  MED REC NO:   [id number]                          ROOM:  ACCOUNT NO:   [de-identified]                           ADMIT DATE: 2021  PROVIDER:     Kristal Villa MD    DATE OF PROCEDURE:  2021    PREOPERATIVE DIAGNOSES: Peripheral vascular disease with bilateral  claudication and ischemic rest pain of the right lower extremity. POSTOPERATIVE DIAGNOSES:  Peripheral vascular disease with bilateral  claudication and ischemic rest pain of the right lower extremity. PROCEDURE:  1. Ultrasound-guided left femoral artery access. 2.  Insertion of catheter in the aorta. 3.  Abdominal aortogram.  4.  Bilateral lower extremity angiograms. SURGEON:  Kristal Villa MD    ANESTHESIA:  Local with moderate monitored sedation. INDICATIONS:  The patient is a 36-year-old male with severe bilateral  claudication and ischemic rest pain of the right foot. He was brought  to the angio suite at this time to undergo angiography with possible  intervention. OPERATIVE PROCEDURE:  The patient was brought to the angio suite and  placed in the supine position. Under my direct supervision, Versed and  fentanyl were administered for moderate sedation. The patient was  monitored by an independent trained nurse observer using continuous  blood pressure, EKG, and pulse oximetry. I spent 20 minutes  face-to-face with the patient providing and directing sedation. The  left femoral region was prepped and draped in sterile fashion. Ultrasound was used to identify the common femoral artery. This was  patent and pulsatile. This was accessed with a 5-Anguillan introducer  sheath. A digital copy of the ultrasound image was saved and placed in  the patient's record.   Bentson wire was advanced into the abdominal  aorta. Modified hook catheter was advanced over the wire and catheter  was advanced to the suprarenal aorta, abdominal aortogram was then  obtained. The catheter was then pulled on just above the aortic  bifurcation and using a bolus blanche technique, bilateral lower extremity  angiograms were performed. The catheter was then removed over the  Bentson wire. Retrograde femoral angiogram was performed and a 5-Lao  Mynx closure device was placed, deployed in standard fashion achieving  excellent hemostasis. The patient was transferred to the recovery area  at the conclusion of procedure. Estimated blood loss was minimal.    ANGIOGRAPHIC FINDINGS:  Abdominal aortogram demonstrates patent celiac  superior mesenteric bilateral renal arteries. There is a flush  occlusion of the right common iliac artery. There is nonvisualization  of the external and internal iliac artery. There is some retrograde  flow into the internal iliac artery. The common femoral artery  reconstitutes below the inguinal ligament via pelvic collaterals. On  the left side, the external iliac artery has some calcific disease, but  it is widely patent as is the common femoral, deep femoral and proximal  superficial femoral arteries. Bilaterally, the superficial femoral  arteries are patent down to the adductor canal where there are segmental  occlusions. There is reconstitution of the above-knee popliteal vessels  bilaterally. Below knee popliteal vessels are patent. There is  evidence of significant tibial disease on the right. It does appear  that at least the posterior tibial and anterior tibial are patent on the  left. The anterior tibial appears to be patent, but diseased. It is  very difficult to tell due to poor contrast timing below this.         Nehemias Strickland MD    D: 06/22/2021 14:48:10       T: 06/22/2021 14:54:01     [email or web address]  Job#: [id number]     Doc#: [id number]    CC:

## 2021-06-23 NOTE — PROGRESS NOTES
Preoperative Screening for Elective Surgery/Invasive Procedures While COVID-19 present in the community     Have you had any of the following symptoms? NONE  o Fever, chills  o Cough  o Shortness of breath  o Muscle aches/pain  o Diarrhea  o Abdominal pain, nausea, vomiting  o Loss or decrease in taste and / or smell   Risk of Exposure  o Have you recently been hospitalized for COVID-19 or flu-like illness, if so when? NO  o Recently diagnosed with COVID-19, if so when? NO  o Recently tested for COVID-19, if so when? NO  o Have you been in close contact with a person or family member who currently has or recently had COVID-23? If yes, when and in what context? NO  o Do you live with anybody who in the last 14 days has had fever, chills, shortness of breath, muscle aches, flu-like illness? NO  o Do you have any close contacts or family members who are currently in the hospital for COVID-19 or flu-like illness? If yes, assess recent close contact with this person. NO    Indicate if the patient has a positive screen by answering yes to one or more of the above questions. Patients who test positive or screen positive prior to surgery or on the day of surgery should be evaluated in conjunction with the surgeon/proceduralist/anesthesiologist to determine the urgency of the procedure.    2ND COVID VACCINE 4/10/2021

## 2021-06-24 ENCOUNTER — PREP FOR PROCEDURE (OUTPATIENT)
Dept: VASCULAR SURGERY | Age: 66
End: 2021-06-24

## 2021-06-24 DIAGNOSIS — Z01.818 PRE-OP TESTING: Primary | ICD-10-CM

## 2021-06-24 RX ORDER — SODIUM CHLORIDE 0.9 % (FLUSH) 0.9 %
5-40 SYRINGE (ML) INJECTION EVERY 12 HOURS SCHEDULED
Status: CANCELLED | OUTPATIENT
Start: 2021-06-24

## 2021-06-24 RX ORDER — SODIUM CHLORIDE 9 MG/ML
25 INJECTION, SOLUTION INTRAVENOUS PRN
Status: CANCELLED | OUTPATIENT
Start: 2021-06-24

## 2021-06-24 RX ORDER — SODIUM CHLORIDE 0.9 % (FLUSH) 0.9 %
5-40 SYRINGE (ML) INJECTION PRN
Status: CANCELLED | OUTPATIENT
Start: 2021-06-24

## 2021-06-25 ENCOUNTER — ANESTHESIA EVENT (OUTPATIENT)
Dept: OPERATING ROOM | Age: 66
DRG: 253 | End: 2021-06-25
Payer: MEDICARE

## 2021-06-25 ENCOUNTER — TELEPHONE (OUTPATIENT)
Dept: VASCULAR SURGERY | Age: 66
End: 2021-06-25

## 2021-06-25 NOTE — TELEPHONE ENCOUNTER
Called patient regarding reminder of his surgery on Monday. Arrive at 5:30am and 7:30am surgery. Npo after midnight.  rosa

## 2021-06-28 ENCOUNTER — ANESTHESIA (OUTPATIENT)
Dept: OPERATING ROOM | Age: 66
DRG: 253 | End: 2021-06-28
Payer: MEDICARE

## 2021-06-28 ENCOUNTER — HOSPITAL ENCOUNTER (INPATIENT)
Age: 66
LOS: 1 days | Discharge: HOME | DRG: 253 | End: 2021-06-29
Attending: SURGERY | Admitting: SURGERY
Payer: MEDICARE

## 2021-06-28 VITALS
RESPIRATION RATE: 1 BRPM | OXYGEN SATURATION: 100 % | SYSTOLIC BLOOD PRESSURE: 117 MMHG | DIASTOLIC BLOOD PRESSURE: 67 MMHG

## 2021-06-28 DIAGNOSIS — I73.9 PVD (PERIPHERAL VASCULAR DISEASE) (HCC): Primary | ICD-10-CM

## 2021-06-28 LAB
ABO/RH: NORMAL
ANTIBODY SCREEN: NORMAL

## 2021-06-28 PROCEDURE — 2720000010 HC SURG SUPPLY STERILE: Performed by: SURGERY

## 2021-06-28 PROCEDURE — 2500000003 HC RX 250 WO HCPCS: Performed by: NURSE ANESTHETIST, CERTIFIED REGISTERED

## 2021-06-28 PROCEDURE — 2580000003 HC RX 258: Performed by: SURGERY

## 2021-06-28 PROCEDURE — C1769 GUIDE WIRE: HCPCS | Performed by: SURGERY

## 2021-06-28 PROCEDURE — C1768 GRAFT, VASCULAR: HCPCS | Performed by: SURGERY

## 2021-06-28 PROCEDURE — 86850 RBC ANTIBODY SCREEN: CPT

## 2021-06-28 PROCEDURE — C1894 INTRO/SHEATH, NON-LASER: HCPCS | Performed by: SURGERY

## 2021-06-28 PROCEDURE — 6360000002 HC RX W HCPCS: Performed by: ANESTHESIOLOGY

## 2021-06-28 PROCEDURE — 3700000001 HC ADD 15 MINUTES (ANESTHESIA): Performed by: SURGERY

## 2021-06-28 PROCEDURE — 041L0JH BYPASS LEFT FEMORAL ARTERY TO RIGHT FEMORAL ARTERY WITH SYNTHETIC SUBSTITUTE, OPEN APPROACH: ICD-10-PCS | Performed by: SURGERY

## 2021-06-28 PROCEDURE — 6360000002 HC RX W HCPCS: Performed by: SURGERY

## 2021-06-28 PROCEDURE — 7100000000 HC PACU RECOVERY - FIRST 15 MIN: Performed by: SURGERY

## 2021-06-28 PROCEDURE — B41F1ZZ FLUOROSCOPY OF RIGHT LOWER EXTREMITY ARTERIES USING LOW OSMOLAR CONTRAST: ICD-10-PCS | Performed by: SURGERY

## 2021-06-28 PROCEDURE — 75710 ARTERY X-RAYS ARM/LEG: CPT | Performed by: SURGERY

## 2021-06-28 PROCEDURE — 6360000002 HC RX W HCPCS: Performed by: NURSE ANESTHETIST, CERTIFIED REGISTERED

## 2021-06-28 PROCEDURE — 3600000017 HC SURGERY HYBRID ADDL 15MIN: Performed by: SURGERY

## 2021-06-28 PROCEDURE — 3700000000 HC ANESTHESIA ATTENDED CARE: Performed by: SURGERY

## 2021-06-28 PROCEDURE — 2580000003 HC RX 258: Performed by: NURSE ANESTHETIST, CERTIFIED REGISTERED

## 2021-06-28 PROCEDURE — 2709999900 HC NON-CHARGEABLE SUPPLY: Performed by: SURGERY

## 2021-06-28 PROCEDURE — 6360000004 HC RX CONTRAST MEDICATION: Performed by: SURGERY

## 2021-06-28 PROCEDURE — 3600000007 HC SURGERY HYBRID BASE: Performed by: SURGERY

## 2021-06-28 PROCEDURE — 86900 BLOOD TYPING SEROLOGIC ABO: CPT

## 2021-06-28 PROCEDURE — 35661 BPG FEMORAL-FEMORAL: CPT | Performed by: SURGERY

## 2021-06-28 PROCEDURE — 6370000000 HC RX 637 (ALT 250 FOR IP): Performed by: SURGERY

## 2021-06-28 PROCEDURE — 86901 BLOOD TYPING SEROLOGIC RH(D): CPT

## 2021-06-28 PROCEDURE — 7100000001 HC PACU RECOVERY - ADDTL 15 MIN: Performed by: SURGERY

## 2021-06-28 PROCEDURE — 2060000000 HC ICU INTERMEDIATE R&B

## 2021-06-28 DEVICE — GRAFT VASC L40CM DIA8MM RAD L40CM EPTFE HEP THN WALLED: Type: IMPLANTABLE DEVICE | Site: GROIN | Status: FUNCTIONAL

## 2021-06-28 RX ORDER — SODIUM CHLORIDE 9 MG/ML
25 INJECTION, SOLUTION INTRAVENOUS PRN
Status: DISCONTINUED | OUTPATIENT
Start: 2021-06-28 | End: 2021-06-28 | Stop reason: HOSPADM

## 2021-06-28 RX ORDER — LIDOCAINE HYDROCHLORIDE 10 MG/ML
0.3 INJECTION, SOLUTION EPIDURAL; INFILTRATION; INTRACAUDAL; PERINEURAL
Status: DISCONTINUED | OUTPATIENT
Start: 2021-06-28 | End: 2021-06-28 | Stop reason: HOSPADM

## 2021-06-28 RX ORDER — SODIUM CHLORIDE 0.9 % (FLUSH) 0.9 %
5-40 SYRINGE (ML) INJECTION PRN
Status: DISCONTINUED | OUTPATIENT
Start: 2021-06-28 | End: 2021-06-28 | Stop reason: HOSPADM

## 2021-06-28 RX ORDER — SODIUM CHLORIDE 0.9 % (FLUSH) 0.9 %
10 SYRINGE (ML) INJECTION PRN
Status: DISCONTINUED | OUTPATIENT
Start: 2021-06-28 | End: 2021-06-29 | Stop reason: HOSPADM

## 2021-06-28 RX ORDER — ONDANSETRON 2 MG/ML
4 INJECTION INTRAMUSCULAR; INTRAVENOUS EVERY 6 HOURS PRN
Status: DISCONTINUED | OUTPATIENT
Start: 2021-06-28 | End: 2021-06-29 | Stop reason: HOSPADM

## 2021-06-28 RX ORDER — SODIUM CHLORIDE 0.9 % (FLUSH) 0.9 %
5-40 SYRINGE (ML) INJECTION EVERY 12 HOURS SCHEDULED
Status: DISCONTINUED | OUTPATIENT
Start: 2021-06-28 | End: 2021-06-28 | Stop reason: HOSPADM

## 2021-06-28 RX ORDER — OXYCODONE HYDROCHLORIDE 5 MG/1
5 TABLET ORAL EVERY 4 HOURS PRN
Status: DISCONTINUED | OUTPATIENT
Start: 2021-06-28 | End: 2021-06-29 | Stop reason: HOSPADM

## 2021-06-28 RX ORDER — HEPARIN SODIUM 1000 [USP'U]/ML
INJECTION, SOLUTION INTRAVENOUS; SUBCUTANEOUS PRN
Status: DISCONTINUED | OUTPATIENT
Start: 2021-06-28 | End: 2021-06-28 | Stop reason: SDUPTHER

## 2021-06-28 RX ORDER — ONDANSETRON 2 MG/ML
4 INJECTION INTRAMUSCULAR; INTRAVENOUS PRN
Status: DISCONTINUED | OUTPATIENT
Start: 2021-06-28 | End: 2021-06-28 | Stop reason: HOSPADM

## 2021-06-28 RX ORDER — HYDRALAZINE HYDROCHLORIDE 20 MG/ML
10 INJECTION INTRAMUSCULAR; INTRAVENOUS
Status: DISCONTINUED | OUTPATIENT
Start: 2021-06-28 | End: 2021-06-29 | Stop reason: HOSPADM

## 2021-06-28 RX ORDER — MEPERIDINE HYDROCHLORIDE 50 MG/ML
12.5 INJECTION INTRAMUSCULAR; INTRAVENOUS; SUBCUTANEOUS EVERY 5 MIN PRN
Status: DISCONTINUED | OUTPATIENT
Start: 2021-06-28 | End: 2021-06-28 | Stop reason: HOSPADM

## 2021-06-28 RX ORDER — LABETALOL HYDROCHLORIDE 5 MG/ML
5 INJECTION, SOLUTION INTRAVENOUS EVERY 10 MIN PRN
Status: DISCONTINUED | OUTPATIENT
Start: 2021-06-28 | End: 2021-06-28 | Stop reason: HOSPADM

## 2021-06-28 RX ORDER — MORPHINE SULFATE 4 MG/ML
4 INJECTION, SOLUTION INTRAMUSCULAR; INTRAVENOUS
Status: DISCONTINUED | OUTPATIENT
Start: 2021-06-28 | End: 2021-06-29 | Stop reason: HOSPADM

## 2021-06-28 RX ORDER — OXYCODONE HYDROCHLORIDE AND ACETAMINOPHEN 5; 325 MG/1; MG/1
1 TABLET ORAL PRN
Status: DISCONTINUED | OUTPATIENT
Start: 2021-06-28 | End: 2021-06-28 | Stop reason: HOSPADM

## 2021-06-28 RX ORDER — CEFAZOLIN SODIUM 1 G/3ML
INJECTION, POWDER, FOR SOLUTION INTRAMUSCULAR; INTRAVENOUS PRN
Status: DISCONTINUED | OUTPATIENT
Start: 2021-06-28 | End: 2021-06-28 | Stop reason: SDUPTHER

## 2021-06-28 RX ORDER — OXYCODONE HYDROCHLORIDE AND ACETAMINOPHEN 5; 325 MG/1; MG/1
2 TABLET ORAL PRN
Status: DISCONTINUED | OUTPATIENT
Start: 2021-06-28 | End: 2021-06-28 | Stop reason: HOSPADM

## 2021-06-28 RX ORDER — CHOLECALCIFEROL (VITAMIN D3) 125 MCG
1000 CAPSULE ORAL DAILY
Status: DISCONTINUED | OUTPATIENT
Start: 2021-06-28 | End: 2021-06-29 | Stop reason: HOSPADM

## 2021-06-28 RX ORDER — ONDANSETRON 2 MG/ML
INJECTION INTRAMUSCULAR; INTRAVENOUS PRN
Status: DISCONTINUED | OUTPATIENT
Start: 2021-06-28 | End: 2021-06-28 | Stop reason: SDUPTHER

## 2021-06-28 RX ORDER — SODIUM CHLORIDE, SODIUM LACTATE, POTASSIUM CHLORIDE, CALCIUM CHLORIDE 600; 310; 30; 20 MG/100ML; MG/100ML; MG/100ML; MG/100ML
INJECTION, SOLUTION INTRAVENOUS CONTINUOUS PRN
Status: DISCONTINUED | OUTPATIENT
Start: 2021-06-28 | End: 2021-06-28 | Stop reason: SDUPTHER

## 2021-06-28 RX ORDER — MORPHINE SULFATE 2 MG/ML
2 INJECTION, SOLUTION INTRAMUSCULAR; INTRAVENOUS
Status: DISCONTINUED | OUTPATIENT
Start: 2021-06-28 | End: 2021-06-29 | Stop reason: HOSPADM

## 2021-06-28 RX ORDER — POTASSIUM CHLORIDE 7.45 MG/ML
10 INJECTION INTRAVENOUS PRN
Status: DISCONTINUED | OUTPATIENT
Start: 2021-06-28 | End: 2021-06-29 | Stop reason: HOSPADM

## 2021-06-28 RX ORDER — PROMETHAZINE HYDROCHLORIDE 25 MG/ML
6.25 INJECTION, SOLUTION INTRAMUSCULAR; INTRAVENOUS
Status: DISCONTINUED | OUTPATIENT
Start: 2021-06-28 | End: 2021-06-28 | Stop reason: HOSPADM

## 2021-06-28 RX ORDER — PROMETHAZINE HYDROCHLORIDE 25 MG/1
12.5 TABLET ORAL EVERY 6 HOURS PRN
Status: DISCONTINUED | OUTPATIENT
Start: 2021-06-28 | End: 2021-06-29 | Stop reason: HOSPADM

## 2021-06-28 RX ORDER — FENTANYL CITRATE 50 UG/ML
INJECTION, SOLUTION INTRAMUSCULAR; INTRAVENOUS PRN
Status: DISCONTINUED | OUTPATIENT
Start: 2021-06-28 | End: 2021-06-28 | Stop reason: SDUPTHER

## 2021-06-28 RX ORDER — ATORVASTATIN CALCIUM 40 MG/1
40 TABLET, FILM COATED ORAL NIGHTLY
Status: DISCONTINUED | OUTPATIENT
Start: 2021-06-28 | End: 2021-06-29 | Stop reason: HOSPADM

## 2021-06-28 RX ORDER — PROPOFOL 10 MG/ML
INJECTION, EMULSION INTRAVENOUS PRN
Status: DISCONTINUED | OUTPATIENT
Start: 2021-06-28 | End: 2021-06-28 | Stop reason: SDUPTHER

## 2021-06-28 RX ORDER — SODIUM CHLORIDE 9 MG/ML
INJECTION, SOLUTION INTRAVENOUS CONTINUOUS
Status: DISCONTINUED | OUTPATIENT
Start: 2021-06-28 | End: 2021-06-29

## 2021-06-28 RX ORDER — VECURONIUM BROMIDE 1 MG/ML
INJECTION, POWDER, LYOPHILIZED, FOR SOLUTION INTRAVENOUS PRN
Status: DISCONTINUED | OUTPATIENT
Start: 2021-06-28 | End: 2021-06-28 | Stop reason: SDUPTHER

## 2021-06-28 RX ORDER — SODIUM CHLORIDE, SODIUM LACTATE, POTASSIUM CHLORIDE, CALCIUM CHLORIDE 600; 310; 30; 20 MG/100ML; MG/100ML; MG/100ML; MG/100ML
INJECTION, SOLUTION INTRAVENOUS CONTINUOUS
Status: DISCONTINUED | OUTPATIENT
Start: 2021-06-28 | End: 2021-06-28

## 2021-06-28 RX ORDER — DIPHENHYDRAMINE HYDROCHLORIDE 50 MG/ML
12.5 INJECTION INTRAMUSCULAR; INTRAVENOUS
Status: DISCONTINUED | OUTPATIENT
Start: 2021-06-28 | End: 2021-06-28 | Stop reason: HOSPADM

## 2021-06-28 RX ORDER — OXYCODONE HYDROCHLORIDE 5 MG/1
10 TABLET ORAL EVERY 4 HOURS PRN
Status: DISCONTINUED | OUTPATIENT
Start: 2021-06-28 | End: 2021-06-29 | Stop reason: HOSPADM

## 2021-06-28 RX ORDER — MORPHINE SULFATE 2 MG/ML
1 INJECTION, SOLUTION INTRAMUSCULAR; INTRAVENOUS EVERY 5 MIN PRN
Status: DISCONTINUED | OUTPATIENT
Start: 2021-06-28 | End: 2021-06-28 | Stop reason: HOSPADM

## 2021-06-28 RX ORDER — HYDRALAZINE HYDROCHLORIDE 20 MG/ML
5 INJECTION INTRAMUSCULAR; INTRAVENOUS EVERY 10 MIN PRN
Status: DISCONTINUED | OUTPATIENT
Start: 2021-06-28 | End: 2021-06-28 | Stop reason: HOSPADM

## 2021-06-28 RX ORDER — MORPHINE SULFATE 2 MG/ML
2 INJECTION, SOLUTION INTRAMUSCULAR; INTRAVENOUS EVERY 5 MIN PRN
Status: DISCONTINUED | OUTPATIENT
Start: 2021-06-28 | End: 2021-06-28 | Stop reason: HOSPADM

## 2021-06-28 RX ORDER — CLONIDINE HYDROCHLORIDE 0.1 MG/1
0.1 TABLET ORAL
Status: DISCONTINUED | OUTPATIENT
Start: 2021-06-28 | End: 2021-06-29 | Stop reason: HOSPADM

## 2021-06-28 RX ORDER — SODIUM CHLORIDE 9 MG/ML
25 INJECTION, SOLUTION INTRAVENOUS PRN
Status: DISCONTINUED | OUTPATIENT
Start: 2021-06-28 | End: 2021-06-29 | Stop reason: HOSPADM

## 2021-06-28 RX ORDER — ASPIRIN 81 MG/1
81 TABLET ORAL NIGHTLY
Status: DISCONTINUED | OUTPATIENT
Start: 2021-06-28 | End: 2021-06-29 | Stop reason: HOSPADM

## 2021-06-28 RX ORDER — SODIUM CHLORIDE 0.9 % (FLUSH) 0.9 %
10 SYRINGE (ML) INJECTION EVERY 12 HOURS SCHEDULED
Status: DISCONTINUED | OUTPATIENT
Start: 2021-06-28 | End: 2021-06-29 | Stop reason: HOSPADM

## 2021-06-28 RX ORDER — MIDAZOLAM HYDROCHLORIDE 1 MG/ML
INJECTION INTRAMUSCULAR; INTRAVENOUS PRN
Status: DISCONTINUED | OUTPATIENT
Start: 2021-06-28 | End: 2021-06-28 | Stop reason: SDUPTHER

## 2021-06-28 RX ADMIN — FENTANYL CITRATE 50 MCG: 50 INJECTION, SOLUTION INTRAMUSCULAR; INTRAVENOUS at 09:34

## 2021-06-28 RX ADMIN — SODIUM CHLORIDE, SODIUM LACTATE, POTASSIUM CHLORIDE, AND CALCIUM CHLORIDE: .6; .31; .03; .02 INJECTION, SOLUTION INTRAVENOUS at 07:30

## 2021-06-28 RX ADMIN — MIDAZOLAM HYDROCHLORIDE 2 MG: 2 INJECTION, SOLUTION INTRAMUSCULAR; INTRAVENOUS at 07:35

## 2021-06-28 RX ADMIN — HEPARIN SODIUM 5000 UNITS: 1000 INJECTION, SOLUTION INTRAVENOUS; SUBCUTANEOUS at 08:34

## 2021-06-28 RX ADMIN — VECURONIUM BROMIDE 10 MG: 1 INJECTION, POWDER, LYOPHILIZED, FOR SOLUTION INTRAVENOUS at 07:35

## 2021-06-28 RX ADMIN — HYDROMORPHONE HYDROCHLORIDE 0.5 MG: 1 INJECTION, SOLUTION INTRAMUSCULAR; INTRAVENOUS; SUBCUTANEOUS at 11:18

## 2021-06-28 RX ADMIN — ATORVASTATIN CALCIUM 40 MG: 40 TABLET, FILM COATED ORAL at 21:15

## 2021-06-28 RX ADMIN — HYDROMORPHONE HYDROCHLORIDE 0.5 MG: 1 INJECTION, SOLUTION INTRAMUSCULAR; INTRAVENOUS; SUBCUTANEOUS at 10:27

## 2021-06-28 RX ADMIN — FENTANYL CITRATE 50 MCG: 50 INJECTION, SOLUTION INTRAMUSCULAR; INTRAVENOUS at 07:35

## 2021-06-28 RX ADMIN — PROPOFOL 100 MG: 10 INJECTION, EMULSION INTRAVENOUS at 07:35

## 2021-06-28 RX ADMIN — OXYCODONE 5 MG: 5 TABLET ORAL at 21:15

## 2021-06-28 RX ADMIN — ONDANSETRON 4 MG: 2 INJECTION INTRAMUSCULAR; INTRAVENOUS at 08:15

## 2021-06-28 RX ADMIN — FENTANYL CITRATE 50 MCG: 50 INJECTION, SOLUTION INTRAMUSCULAR; INTRAVENOUS at 08:30

## 2021-06-28 RX ADMIN — SODIUM CHLORIDE, SODIUM LACTATE, POTASSIUM CHLORIDE, AND CALCIUM CHLORIDE: .6; .31; .03; .02 INJECTION, SOLUTION INTRAVENOUS at 09:28

## 2021-06-28 RX ADMIN — PROPOFOL 50 MG: 10 INJECTION, EMULSION INTRAVENOUS at 07:44

## 2021-06-28 RX ADMIN — CEFAZOLIN 2000 MG: 1 INJECTION, POWDER, FOR SOLUTION INTRAMUSCULAR; INTRAVENOUS at 07:33

## 2021-06-28 RX ADMIN — SODIUM CHLORIDE: 9 INJECTION, SOLUTION INTRAVENOUS at 16:26

## 2021-06-28 RX ADMIN — SUGAMMADEX 200 MG: 100 INJECTION, SOLUTION INTRAVENOUS at 09:58

## 2021-06-28 RX ADMIN — FENTANYL CITRATE 50 MCG: 50 INJECTION, SOLUTION INTRAMUSCULAR; INTRAVENOUS at 08:03

## 2021-06-28 RX ADMIN — ASPIRIN 81 MG: 81 TABLET, COATED ORAL at 21:15

## 2021-06-28 RX ADMIN — FENTANYL CITRATE 50 MCG: 50 INJECTION, SOLUTION INTRAMUSCULAR; INTRAVENOUS at 08:33

## 2021-06-28 ASSESSMENT — PULMONARY FUNCTION TESTS
PIF_VALUE: 24
PIF_VALUE: 24
PIF_VALUE: 3
PIF_VALUE: 20
PIF_VALUE: 25
PIF_VALUE: 25
PIF_VALUE: 20
PIF_VALUE: 24
PIF_VALUE: 25
PIF_VALUE: 24
PIF_VALUE: 1
PIF_VALUE: 24
PIF_VALUE: 20
PIF_VALUE: 25
PIF_VALUE: 20
PIF_VALUE: 24
PIF_VALUE: 25
PIF_VALUE: 19
PIF_VALUE: 19
PIF_VALUE: 24
PIF_VALUE: 24
PIF_VALUE: 21
PIF_VALUE: 26
PIF_VALUE: 24
PIF_VALUE: 6
PIF_VALUE: 25
PIF_VALUE: 24
PIF_VALUE: 19
PIF_VALUE: 24
PIF_VALUE: 20
PIF_VALUE: 24
PIF_VALUE: 19
PIF_VALUE: 7
PIF_VALUE: 19
PIF_VALUE: 25
PIF_VALUE: 24
PIF_VALUE: 24
PIF_VALUE: 25
PIF_VALUE: 20
PIF_VALUE: 21
PIF_VALUE: 24
PIF_VALUE: 20
PIF_VALUE: 25
PIF_VALUE: 24
PIF_VALUE: 24
PIF_VALUE: 20
PIF_VALUE: 19
PIF_VALUE: 24
PIF_VALUE: 20
PIF_VALUE: 24
PIF_VALUE: 24
PIF_VALUE: 21
PIF_VALUE: 24
PIF_VALUE: 23
PIF_VALUE: 24
PIF_VALUE: 21
PIF_VALUE: 24
PIF_VALUE: 25
PIF_VALUE: 24
PIF_VALUE: 1
PIF_VALUE: 21
PIF_VALUE: 25
PIF_VALUE: 24
PIF_VALUE: 25
PIF_VALUE: 26
PIF_VALUE: 25
PIF_VALUE: 24
PIF_VALUE: 3
PIF_VALUE: 24
PIF_VALUE: 24
PIF_VALUE: 20
PIF_VALUE: 19
PIF_VALUE: 24
PIF_VALUE: 19
PIF_VALUE: 24
PIF_VALUE: 23
PIF_VALUE: 20
PIF_VALUE: 23
PIF_VALUE: 20
PIF_VALUE: 24
PIF_VALUE: 19
PIF_VALUE: 24
PIF_VALUE: 19
PIF_VALUE: 24
PIF_VALUE: 30
PIF_VALUE: 24
PIF_VALUE: 25
PIF_VALUE: 24
PIF_VALUE: 24
PIF_VALUE: 19
PIF_VALUE: 18
PIF_VALUE: 24
PIF_VALUE: 24
PIF_VALUE: 19
PIF_VALUE: 18
PIF_VALUE: 25
PIF_VALUE: 25
PIF_VALUE: 24
PIF_VALUE: 25
PIF_VALUE: 24
PIF_VALUE: 2
PIF_VALUE: 24
PIF_VALUE: 20
PIF_VALUE: 25
PIF_VALUE: 24
PIF_VALUE: 19
PIF_VALUE: 25
PIF_VALUE: 2
PIF_VALUE: 24
PIF_VALUE: 20
PIF_VALUE: 24
PIF_VALUE: 25
PIF_VALUE: 26
PIF_VALUE: 5
PIF_VALUE: 24
PIF_VALUE: 25
PIF_VALUE: 25
PIF_VALUE: 19
PIF_VALUE: 24
PIF_VALUE: 20
PIF_VALUE: 20
PIF_VALUE: 23
PIF_VALUE: 20
PIF_VALUE: 25
PIF_VALUE: 24
PIF_VALUE: 24
PIF_VALUE: 25
PIF_VALUE: 24
PIF_VALUE: 25
PIF_VALUE: 25
PIF_VALUE: 23
PIF_VALUE: 21
PIF_VALUE: 20
PIF_VALUE: 20
PIF_VALUE: 5
PIF_VALUE: 25
PIF_VALUE: 24
PIF_VALUE: 21
PIF_VALUE: 24
PIF_VALUE: 24
PIF_VALUE: 20
PIF_VALUE: 25
PIF_VALUE: 28
PIF_VALUE: 9

## 2021-06-28 ASSESSMENT — PAIN SCALES - GENERAL
PAINLEVEL_OUTOF10: 8
PAINLEVEL_OUTOF10: 5
PAINLEVEL_OUTOF10: 8

## 2021-06-28 ASSESSMENT — PAIN DESCRIPTION - ORIENTATION: ORIENTATION: RIGHT;LEFT

## 2021-06-28 ASSESSMENT — PAIN DESCRIPTION - LOCATION: LOCATION: GROIN

## 2021-06-28 ASSESSMENT — PAIN DESCRIPTION - DESCRIPTORS: DESCRIPTORS: ACHING;CONSTANT

## 2021-06-28 ASSESSMENT — PAIN - FUNCTIONAL ASSESSMENT: PAIN_FUNCTIONAL_ASSESSMENT: 0-10

## 2021-06-28 ASSESSMENT — PAIN DESCRIPTION - PAIN TYPE: TYPE: SURGICAL PAIN

## 2021-06-28 NOTE — ANESTHESIA PRE PROCEDURE
lb (102.5 kg)     Body mass index is 33.91 kg/m². CBC:   Lab Results   Component Value Date    WBC 6.8 06/22/2021    RBC 4.73 06/22/2021    HGB 18.4 06/22/2021    HCT 47.7 06/22/2021    .9 06/22/2021    RDW 14.0 06/22/2021     06/22/2021       CMP:   Lab Results   Component Value Date     06/22/2021    K 3.7 06/22/2021    CL 97 06/22/2021    CO2 26 06/22/2021    BUN 6 06/22/2021    CREATININE 1.1 06/22/2021    CREATININE 0.9 06/22/2021    GFRAA >60 06/22/2021    AGRATIO 1.3 05/21/2021    LABGLOM >60 06/22/2021    GLUCOSE 100 06/22/2021    PROT 7.1 05/21/2021    CALCIUM 9.9 06/22/2021    BILITOT 0.6 05/21/2021    ALKPHOS 59 05/21/2021    AST 19 05/21/2021    ALT 27 05/21/2021       POC Tests: No results for input(s): POCGLU, POCNA, POCK, POCCL, POCBUN, POCHEMO, POCHCT in the last 72 hours.     Coags:   Lab Results   Component Value Date    PROTIME 12.8 06/22/2021    INR 1.10 06/22/2021       HCG (If Applicable): No results found for: PREGTESTUR, PREGSERUM, HCG, HCGQUANT     ABGs: No results found for: PHART, PO2ART, YUB6IFI, FSX3HTF, BEART, P3UODAMC     Type & Screen (If Applicable):  No results found for: LABABO, LABRH    Drug/Infectious Status (If Applicable):  No results found for: HIV, HEPCAB    COVID-19 Screening (If Applicable): No results found for: COVID19        Anesthesia Evaluation  Patient summary reviewed no history of anesthetic complications:   Airway: Mallampati: III  TM distance: >3 FB   Neck ROM: full  Mouth opening: > = 3 FB Dental:    (+) edentulous      Pulmonary:Negative Pulmonary ROS and normal exam  breath sounds clear to auscultation                             Cardiovascular:Negative CV ROS  Exercise tolerance: good (>4 METS),   (+) hypertension:,         Rhythm: regular  Rate: normal                    Neuro/Psych:   Negative Neuro/Psych ROS  (+) TIA,             GI/Hepatic/Renal: Neg GI/Hepatic/Renal ROS            Endo/Other: Negative Endo/Other ROS Abdominal:             Vascular: negative vascular ROS.  + PVD, aortic or cerebral, . Other Findings:          Pre-Operative Diagnosis: Peripheral vascular disease ([facility] [address] [I73.9]    60 y.o.   BMI:  Body mass index is 33.91 kg/m². Vitals:    06/23/21 1029 06/28/21 0617   BP:  135/80   Pulse:  87   Resp:  16   Temp:  97.4 °F (36.3 °C)   TempSrc:  Temporal   SpO2:  98%   Weight: 223 lb (101.2 kg) 223 lb (101.2 kg)   Height: 5' 8\" (1.727 m) 5' 8\" (1.727 m)       No Known Allergies    Social History     Tobacco Use    Smoking status: Current Every Day Smoker     Packs/day: 1.00     Types: Cigarettes    Smokeless tobacco: Never Used   Substance Use Topics    Alcohol use: Not Currently       LABS:    CBC  Lab Results   Component Value Date/Time    WBC 6.8 06/22/2021 01:20 PM    HGB 18.4 (H) 06/22/2021 01:33 PM    HCT 47.7 06/22/2021 01:20 PM     06/22/2021 01:20 PM     RENAL  Lab Results   Component Value Date/Time     (L) 06/22/2021 01:20 PM    K 3.7 06/22/2021 01:20 PM    CL 97 (L) 06/22/2021 01:20 PM    CO2 26 06/22/2021 01:20 PM    BUN 6 (L) 06/22/2021 01:20 PM    CREATININE 1.1 06/22/2021 01:33 PM    CREATININE 0.9 06/22/2021 01:20 PM    GLUCOSE 100 (H) 06/22/2021 01:20 PM     COAGS  Lab Results   Component Value Date/Time    PROTIME 12.8 06/22/2021 01:20 PM    INR 1.10 06/22/2021 01:20 PM        Anesthesia Plan      general     ASA 3     (I discussed with the patient the risks and benefits of PIV, anesthesia, IV Narcotics, PACU. All questions were answered the patient agrees with the plan and wishes to proceed)  Induction: intravenous.                           Eliza Francois MD   6/28/2021

## 2021-06-28 NOTE — PROGRESS NOTES
A: Bedside report given to Arlen, all current drips, treatments, skin issues, and plan of care were reviewed by both RN's, care transferred in stable condition via bed. TC to CMU to see if tele monitor was receiving a signal, it was reported that the monitor was recording.

## 2021-06-28 NOTE — BRIEF OP NOTE
Brief Postoperative Note      Patient: Coleen Salinas. YOB: 1955  MRN: [id number]    Date of Procedure: 6/28/2021    Pre-Op Diagnosis: PERIPHERAL VASCULAR DISEASE    Post-Op Diagnosis: Same       Procedure(s): FEMORAL TO FEMORAL BYPASS GRAFT WITH RIGHT LOWER EXTREMITY ANGIOGRAM    Surgeon(s):  Ru Resendez MD    Assistant:  Surgical Assistant: Ellie Bledsoe    Anesthesia: General    Estimated Blood Loss (mL): less than 835     Complications: None    Specimens:   * No specimens in log *    Implants:  Implant Name Type Inv.  Item Serial No.  Lot No. LRB No. Used Action   GRAFT VASC L40CM DIA8MM RAD L40CM EPTFE HEP THN WALLED - [id number]  GRAFT VASC L40CM DIA8MM RAD L40CM EPTFE HEP THN WALLED [id number]  [employer]   1 Implanted         Drains:   Urethral Catheter Non-latex 16 fr (Active)         Electronically signed by Ru Resendez MD on 6/28/2021 at 10:14 AM

## 2021-06-28 NOTE — OP NOTE
[facility]                                OPERATIVE REPORT    PATIENT NAME: Lizet Bernard                      :        1955  MED REC NO:   [id number]                          ROOM:       4059  ACCOUNT NO:   [de-identified]                           ADMIT DATE: 2021  PROVIDER:     Giuliano Gilmore MD    DATE OF PROCEDURE:  2021    PREOPERATIVE DIAGNOSIS:  Peripheral vascular disease with bilateral  claudication, most severe in the right lower extremity. POSTOPERATIVE DIAGNOSIS:  Peripheral vascular disease with bilateral  claudication, most severe in the right lower extremity. PROCEDURE:  1. Left to right femoral-femoral bypass. 2.  Right lower extremity angiogram.    ANESTHESIA:  General endotracheal.    SURGEON:  Giuliano Gilmore MD    INDICATIONS:  The patient is a 60-year-old male with severe bilateral  lower extremity claudication, most severe in the right lower extremity. He had previously undergone angiogram showing complete occlusion of the  right common and external iliac artery. In addition, bilateral  superficial femoral artery occlusions were noted. The flow was  difficult to determine on the right side due to the inflow components  therefore he is being brought to the operating room at this time to  undergo femoral-femoral bypass to treat the inflow disease and at the  same time to undergo repeat right lower extremity angiogram to better  define the superficial femoral artery occlusion and perhaps treat this  with angioplasty and stenting. PROCEDURE:  The patient was brought to the operating room, placed in  supine position. General endotracheal anesthesia induced. After  adequate anesthesia, the abdomen and pelvic regions were prepped and  draped in sterile fashion. Bilateral transverse groin incisions were  made exposing the common, deep and superficial femoral arteries.   On the  right side, this was nonpulsatile. Using blunt finger dissection, a  tunnel was created in the retroperitoneum underneath the inguinal  ligament in the space of Retzius. Once the two fingers were able to  connect, I passed a Kalli-Wick tunneler and pulled an 8 mm internally  reinforced GORE PROPATEN graft through this retroperitoneal tunnel  exiting both groin incisions. The patient was then given 5000 units of  intravenous heparin. After approximately 3 minutes starting on the left  side, the common, deep and superficial femoral arteries were occluded  with clamps and vessel loops. Longitudinal arteriotomy was made. The  graft was cut to the appropriate length and angle and the femoral  anastomosis fashioned using running 5-0 GORE-BRITANY sutures. After  completing the anastomosis, arteries were backbled and flushed through  the open end of the graft. The graft was then clamped and flow  reestablished down the left leg. Attention was then directed to the  right femoral artery. In a similar fashion, the arteries were clamped  and occluded using vessel loops and clamps. Longitudinal arteriotomy  was made in the femoral artery. The graft cut to the appropriate length  and angle and the anastomosis performed using 5-0 GORE-BRITANY suture again. Prior to reestablishing flow, the arteries were backbled and flushed as  was the graft. The anastomosis then completed and flow reestablished. There were excellent pulses noted in the proximal superficial femoral  artery and the deep femoral artery. The superficial femoral artery was  then punctured using a 5-Latvian micropuncture sheath, a 6-Latvian  introducer sheath was then advanced in an antegrade fashion and the  proximal superficial femoral artery. Angiogram was then performed. This showed a multitude of collaterals across a short segmental  occlusion in the mid superficial femoral artery.   The flow was so fast  it was nearly as if there was no occlusion noted.  Angiographic below  the knee demonstrated a mild popliteal stenosis, but three-vessel  runoff. Because of these findings and a brisk flow, no intervention was  performed. The sheath was removed and the puncture site in the  superficial femoral artery was closed using a preplaced 6-0 Prolene  figure-of-eight suture achieving excellent hemostasis. The groin  incisions were then irrigated with antibiotic saline solution and they  were closed using multiple layers of running 2-0 Vicryl suture, 3-0  Vicryl suture, then running 4-0 Monocryl subcuticular sutures were used  to reapproximate the skin edges. The wounds were dressed with  Steri-Strips and then bilateral MINDA negative pressure wound dressings  were applied. There were no complications to this procedure. At the  end of procedure, sponge, needle and instrument counts were correct. The patient was extubated in the operating room and transferred to the  recovery area in stable condition. Estimated blood loss was less than  100 mL.         Chanel Marlow MD    D: 06/28/2021 12:36:56       T: 06/28/2021 12:40:08     [email or web address]  Job#: [id number]     Doc#: [id number]    CC:

## 2021-06-29 VITALS
BODY MASS INDEX: 33.8 KG/M2 | WEIGHT: 223 LBS | HEIGHT: 68 IN | DIASTOLIC BLOOD PRESSURE: 76 MMHG | RESPIRATION RATE: 16 BRPM | OXYGEN SATURATION: 95 % | SYSTOLIC BLOOD PRESSURE: 116 MMHG | HEART RATE: 88 BPM | TEMPERATURE: 98.3 F

## 2021-06-29 LAB
ANION GAP SERPL CALCULATED.3IONS-SCNC: 9 MMOL/L (ref 3–16)
BUN BLDV-MCNC: 7 MG/DL (ref 7–20)
CALCIUM SERPL-MCNC: 8.9 MG/DL (ref 8.3–10.6)
CHLORIDE BLD-SCNC: 100 MMOL/L (ref 99–110)
CO2: 24 MMOL/L (ref 21–32)
CREAT SERPL-MCNC: 0.8 MG/DL (ref 0.8–1.3)
GFR AFRICAN AMERICAN: >60
GFR NON-AFRICAN AMERICAN: >60
GLUCOSE BLD-MCNC: 108 MG/DL (ref 70–99)
HCT VFR BLD CALC: 38.7 % (ref 40.5–52.5)
HEMOGLOBIN: 13.9 G/DL (ref 13.5–17.5)
MCH RBC QN AUTO: 35.5 PG (ref 26–34)
MCHC RBC AUTO-ENTMCNC: 35.9 G/DL (ref 31–36)
MCV RBC AUTO: 98.8 FL (ref 80–100)
PDW BLD-RTO: 13.7 % (ref 12.4–15.4)
PLATELET # BLD: 160 K/UL (ref 135–450)
PMV BLD AUTO: 7.2 FL (ref 5–10.5)
POTASSIUM REFLEX MAGNESIUM: 4.2 MMOL/L (ref 3.5–5.1)
RBC # BLD: 3.91 M/UL (ref 4.2–5.9)
SODIUM BLD-SCNC: 133 MMOL/L (ref 136–145)
WBC # BLD: 7.1 K/UL (ref 4–11)

## 2021-06-29 PROCEDURE — 2580000003 HC RX 258: Performed by: SURGERY

## 2021-06-29 PROCEDURE — 36415 COLL VENOUS BLD VENIPUNCTURE: CPT

## 2021-06-29 PROCEDURE — 80048 BASIC METABOLIC PNL TOTAL CA: CPT

## 2021-06-29 PROCEDURE — 85027 COMPLETE CBC AUTOMATED: CPT

## 2021-06-29 RX ORDER — OXYCODONE HYDROCHLORIDE 5 MG/1
5 TABLET ORAL EVERY 4 HOURS PRN
Qty: 20 TABLET | Refills: 0 | Status: SHIPPED | OUTPATIENT
Start: 2021-06-29 | End: 2021-07-02

## 2021-06-29 RX ADMIN — SODIUM CHLORIDE: 9 INJECTION, SOLUTION INTRAVENOUS at 05:56

## 2021-06-29 ASSESSMENT — PAIN SCALES - GENERAL: PAINLEVEL_OUTOF10: 0

## 2021-06-29 NOTE — FLOWSHEET NOTE
06/29/21 0941   Vital Signs   Temp 98.3 °F (36.8 °C)   Temp Source Oral   Pulse 81   Heart Rate Source Monitor   Resp 18   /66   BP Location Right upper arm   MAP (mmHg) 87   Patient Position Supine   Level of Consciousness Alert (0)   MEWS Score 1   Patient Currently in Pain Denies   Oxygen Therapy   SpO2 95 %   O2 Device None (Room air)

## 2021-06-29 NOTE — FLOWSHEET NOTE
06/28/21 2104   Assessment   Charting Type Shift assessment   Neurological   Neuro (WDL) WDL   Level of Consciousness Alert (0)   Orientation Level Oriented X4   Cognition Appropriate judgement; Follows commands   Language Clear; Appropriate for developmental age   R Foot Dorsiflexion Moderate   L Foot Dorsiflexion Moderate   R Foot Plantar Flexion Moderate   L Foot Plantar Flexion Moderate   RLE Motor Response Responds to command   LLE Motor Response Responds to command   Prema Coma Scale   Eye Opening 4   Best Verbal Response 5   Best Motor Response 6   Prema Coma Scale Score 15   HEENT   HEENT (WDL) X   Right Eye Blind   Left Eye Blind   Teeth Missing teeth;Edentulous   Respiratory   Respiratory (WDL) WDL   Respiratory Pattern Regular   Respiratory Depth Normal   Respiratory Quality/Effort Unlabored   Chest Assessment Chest expansion symmetrical   L Breath Sounds Clear   R Breath Sounds Clear   Gastrointestinal   Abdominal (WDL) WDL   Abdomen Inspection Soft   RUQ Bowel Sounds Active   LUQ Bowel Sounds Active   RLQ Bowel Sounds Active   LLQ Bowel Sounds Active   Peripheral Vascular   Peripheral Vascular (WDL) WDL   Edema None   RLE Neurovascular Assessment   Capillary Refill Less than/equal to 3 seconds   Color Appropriate for ethnicity   Temperature Warm   Sensation RLE Pain;Tingling   R Popliteal Pulse Doppler   R Post Tibial Pulse Doppler   R Pedal Pulse Doppler   LLE Neurovascular Assessment   Capillary Refill Less than/equal to 3 seconds   Color Appropriate for ethnicity   Temperature Warm   Sensation LLE Pain;Tingling   L Popliteal Pulse Doppler   L Post Tibial Pulse Doppler   L Pedal Pulse Doppler   Skin Color/Condition   Skin Color/Condition (WDL) WDL   Skin Integrity   Skin Integrity (WDL) WDL   Musculoskeletal   Musculoskeletal (WDL) X  (weakness in lower extremity)   Genitourinary   Genitourinary (WDL) X  (f/c)   Urethral Catheter Non-latex 16 fr   Placement Date: 06/28/21   Urethral Catheter Timeout: Sterile technique  Inserted by: DUKE VALDES  Catheter Type: Non-latex  Tube Size (fr): 16 fr  Catheter Balloon Size: 5 mL  Collection Container: Standard  Urine Returned: Yes   $ Urethral catheter insertion Inserted for procedure   Catheter Indications Need for fluid volume management of the critically ill patient in a critical care setting   Urine Color Yellow   Urine Appearance Clear   Incision 06/28/21 Femoral Anterior;Proximal;Right   Date First Assessed/Time First Assessed: 06/28/21 0800   Present on Hospital Admission: No  Primary Wound Type: Incision  Location: Femoral  Wound Location Orientation: Anterior;Proximal;Right   Dressing Status Clean;Dry; Intact   Dressing/Treatment Negative pressure wound therapy  (tomer dressing)   Closure Sutures   Odor None   Incision 06/28/21 Femoral Anterior;Proximal;Right   Date First Assessed/Time First Assessed: 06/28/21 0805   Present on Hospital Admission: No  Primary Wound Type: Incision  Location: Femoral  Wound Location Orientation: Anterior;Proximal;Right   Dressing Status Clean;Dry; Intact   Dressing/Treatment Negative pressure wound therapy  (tomer dressing)   Closure Sutures   Drainage Amount None   Odor None   Psychosocial   Psychosocial (WDL) WDL

## 2021-06-29 NOTE — DISCHARGE SUMMARY
Physician Discharge Summary     Patient ID:  Vijay Vallecillo  [id number]  60 y.o.  1955    Admit date: 6/28/2021    Discharge date and time: 6/29/2021  2:24 PM     Admitting Physician: Isabel Andrade MD     Discharge Physician: same    Admission Diagnoses: Peripheral vascular disease ([facility] [I73.9]  PVD (peripheral vascular disease) ([facility] [I73.9]    Discharge Diagnoses: same    Admission Condition: good    Discharged Condition: good    Indication for Admission: Admitted to undergo Fem-Fem bypass for R Iliac occlusion and severe claudication. Hospital Course: Admitted, taken to OR where above performed. Post-op course routine and unremarkable. Disposition: home    In process/preliminary results:  Outstanding Order Results     No orders found for last 30 day(s). Patient Instructions:   Discharge Medication List as of 6/29/2021 12:47 PM      START taking these medications    Details   oxyCODONE (ROXICODONE) 5 MG immediate release tablet Take 1 tablet by mouth every 4 hours as needed for Pain for up to 3 days. , Disp-20 tablet, R-0Normal         CONTINUE these medications which have NOT CHANGED    Details   vitamin B-12 (CYANOCOBALAMIN) 1000 MCG tablet Take 1,000 mcg by mouth dailyHistorical Med      aspirin 81 MG EC tablet Take 1 tablet by mouth daily, Disp-30 tablet, R-3Normal      atorvastatin (LIPITOR) 80 MG tablet Take 0.5 tablets by mouth nightly, Disp-30 tablet, R-3Normal      acetaminophen (APAP EXTRA STRENGTH) 500 MG tablet Take 2 tablets by mouth every 6 hours as needed for Pain, Disp-120 tablet, R-3Normal           Activity: activity as tolerated  Diet: regular diet  Wound Care: MINDA dressings to be removed and discarded July 1. Follow-up with Dr Bin Hernandez  in 2 weeks. Signed:   Isbael Andrade MD  6/29/2021  4:24 PM

## 2021-07-12 PROBLEM — I42.9 CARDIOMYOPATHY (HCC): Status: ACTIVE | Noted: 2021-07-12

## 2021-07-12 PROBLEM — Z95.828 S/P FEMORAL-FEMORAL BYPASS SURGERY: Status: ACTIVE | Noted: 2021-07-12

## 2021-07-12 NOTE — PROGRESS NOTES
[facility]   Cardiac Followup    Referring Provider:  JANET Starks CNP     Chief Complaint   Patient presents with    New Patient      Subjective: Mr. Jose Cruz Ferro is a new patient to me today Referred by Jg Snow for abnormal ECHO; c/o SOB today    History of Present Illness:  Doris Flores is a 60 y.o. male who is a new patient to me. He is referred by Los Gupta for abnormal ECHO. 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 Cheli Frazier. Most recent CTA head and neck 5/21/21 No flow-limiting arterial stenosis in the head and neck. Most recent ECHO 6/11/21 EF=40-45%. Global LV HK; grade I DD with normal LV filling pressure. Normal LV size and wall thickness. No significant valvular heart disease. Most recent EKG 6/22/21 NSR; 77bpm.  He had Fem-Fem bypass on 6/28/20 for R. iliac occlusion and severe claudication with Dr. Cheli Frazier. Most recent EKG in office today 7/13/21 SR; 93bpm; PVC's (PVC's new compared to 6/22/21 EKG). Today reports a few weeks ago lost all feeling in his right leg. Prior he had calf pain with exertion for 3-4 years. He presented to hospital in May and testing led to eventual revascularization of peripheral arteries. He reports feeling better post Fem-Fem bypass. His activity is limited due to being blindness and claudication prior. He has SOB with activity. He denies chest pain, edema, dizziness, palpitations and syncope. He has cut back to 4 cigs/day (prior 1ppd x several years). Past Medical History:   has a past medical history of Blind in both eyes, Constipation, Histoplasmosis, Hyperlipidemia, , and PVD (peripheral vascular disease) ([address] Surgical History:   has a past surgical history that includes eye surgery (Bilateral) and Femoral-femoral Bypass Graft (Bilateral, 6/28/2021). Social History:  He is . Lives in [facility]. Has 2 children.  On disability- legally blind related to histoplasmosis. He currently smoking 4 cigarettes daily. Occasional alcohol use    reports that he has been smoking cigarettes. He has been smoking about 0.25 packs per day. He has never used smokeless tobacco. He reports previous alcohol use. He reports that he does not use drugs. Family History:  Sister valve replacement in 63's  family history includes Alzheimer's Disease in his mother; Dementia in his father. Home Medications:  Prior to Admission medications    Medication Sig Start Date End Date Taking? Authorizing Provider   vitamin B-12 (CYANOCOBALAMIN) 1000 MCG tablet Take 1,000 mcg by mouth daily   Yes Historical Provider, MD   aspirin 81 MG EC tablet Take 1 tablet by mouth daily  Patient taking differently: Take 81 mg by mouth nightly  5/23/21  Yes Alondra Paulino MD   atorvastatin (LIPITOR) 80 MG tablet Take 0.5 tablets by mouth nightly 5/22/21  Yes Alondra Paulino MD   acetaminophen (APAP EXTRA STRENGTH) 500 MG tablet Take 2 tablets by mouth every 6 hours as needed for Pain 5/22/21  Yes Alondra Paulino MD        Allergies:  Patient has no known allergies. Review of Systems:   · Constitutional: there has been no unanticipated weight loss. There's been no change in energy level, sleep pattern, or activity level. · Eyes: No visual changes or diplopia. No scleral icterus. · ENT: No Headaches, hearing loss or vertigo. No mouth sores or sore throat. · Cardiovascular: Reviewed in HPI  · Respiratory: No cough or wheezing, no sputum production. No hematemesis. · Gastrointestinal: No abdominal pain, appetite loss, blood in stools. No change in bowel or bladder habits. · Genitourinary: No dysuria, trouble voiding, or hematuria. · Musculoskeletal:  No gait disturbance, weakness or joint complaints. · Integumentary: No rash or pruritis. · Neurological: No headache, diplopia, change in muscle strength, numbness or tingling.  No change in gait, balance, coordination, mood, affect, memory, mentation, behavior. · Psychiatric: No anxiety, no depression. · Endocrine: No malaise, fatigue or temperature intolerance. No excessive thirst, fluid intake, or urination. No tremor. · Hematologic/Lymphatic: No abnormal bruising or bleeding, blood clots or swollen lymph nodes. · Allergic/Immunologic: No nasal congestion or hives. Physical Examination:    Vitals:    07/13/21 1316   BP: 130/68   Pulse: 104   SpO2: 98%        Constitutional and General Appearance: NAD   Respiratory:  · Normal excursion and expansion without use of accessory muscles  · Resp Auscultation: Normal breath sounds without dullness  Cardiovascular:  · The apical impulses not displaced  · Heart tones are crisp and normal  · Cervical veins are not engorged  · The carotid upstroke is normal in amplitude and contour without delay or bruit  · Irregularly irregular Normal S1S2, No S3, No Murmur  · Peripheral pulses are symmetrical and full  · There is no clubbing, cyanosis of the extremities. · No edema  · Femoral Arteries: 2+ and equal  · Pedal Pulses: 2+ and equal   Abdomen:  · No masses or tenderness  · Liver/Spleen: No Abnormalities Noted  Neurological/Psychiatric:  · Alert and oriented in all spheres  · Moves all extremities well  · Exhibits normal gait balance and coordination  · No abnormalities of mood, affect, memory, mentation, or behavior are noted        Skin: warm and dry    Lab Results   Component Value Date    CHOL 230 (H) 05/22/2021     Lab Results   Component Value Date    TRIG 158 (H) 05/22/2021     Lab Results   Component Value Date    HDL 31 (L) 05/22/2021     Lab Results   Component Value Date    LDLCALC 167 (H) 05/22/2021     Lab Results   Component Value Date    LABVLDL 32 05/22/2021     No results found for: CHOLHDLRATIO    Assessment:     1. Cardiomyopathy, unspecified type ([facility] [address]): Undefined etiology. Most recent ECHO 6/11/21 EF=40-45%. Global LV HK; grade I DD with normal LV filling pressure. Normal LV size and wall thickness. No significant valvular heart disease. Concerned for ischemia possibly given PVD and CAD risk factors. I d/w Dr. Sheron Roman and he prefers no possibility of intervention in groin to let graft heal. We can use radial artery but if emergency occurs and needs possible impella device this could compromise surgery. He is also concerned about strong anticoagulant use as well. I will risk stratify with lexiscan nuclear stress test to see if more evidence to do diagnostic angiogram or can treat medically aggressively. Will start BB and ACE-I for LV dysfunction. 2. S/P L-R femoral-femoral bypass surgery: Performed on 6/28/21 Dr. Sheron Roman due to severe claudication, numbness, and Right iliac occlusion. He is feeling better overall. 3. Tobacco abuse: Strongly encouraged to quit entirely. He has cut back to 4 cigs/day and will try to quit entirely in near future. 4. Other hyperlipidemia: Most recent lipids 5/22/21. I personally reviewed lab results in epic from 5/22/21 (see above) and discussed with patient. Suboptimal lipid control. He just started lipitor 40mg daily in May. Will recheck labs in near future and adjust accordingly. I want to be aggressive and lower as much as possible. Plan:  1. I spoke to Dr. Sheron Roman pt had Fem-Fem bypass 6/28/21. Dr. Sheron Roman would like for grafts to heal before [facility]. Considering new finding of cardiomyopathy, PVD, and SOB with exertion. Will check Lexiscan myoview stress test to assess for myocardial ischemia. . Call central scheduling [phone] to schedule testing. 2. Will reassess Fasting lipids and LFT's before stress test. He was started on Lipitor in May. 3. Someone from my office will call with test result. 4. Will start Toprol 25mg nightly and lisinopril 10mg daily   5.  Follow up with me in 4-5 weeks     This note was scribed in the presence of Irasema Negron MD by Kay Gonzalez RN     I, Dr. Maria A Early, personally performed the

## 2021-07-13 ENCOUNTER — OFFICE VISIT (OUTPATIENT)
Dept: CARDIOLOGY CLINIC | Age: 66
End: 2021-07-13

## 2021-07-13 VITALS
HEART RATE: 104 BPM | WEIGHT: 214.8 LBS | SYSTOLIC BLOOD PRESSURE: 130 MMHG | OXYGEN SATURATION: 98 % | DIASTOLIC BLOOD PRESSURE: 68 MMHG | HEIGHT: 68 IN | BODY MASS INDEX: 32.55 KG/M2

## 2021-07-13 DIAGNOSIS — E78.49 OTHER HYPERLIPIDEMIA: ICD-10-CM

## 2021-07-13 DIAGNOSIS — Z95.828 S/P FEMORAL-FEMORAL BYPASS SURGERY: ICD-10-CM

## 2021-07-13 DIAGNOSIS — I42.9 CARDIOMYOPATHY, UNSPECIFIED TYPE (HCC): ICD-10-CM

## 2021-07-13 DIAGNOSIS — R06.02 SOB (SHORTNESS OF BREATH) ON EXERTION: ICD-10-CM

## 2021-07-13 DIAGNOSIS — I15.9 SECONDARY HYPERTENSION: Primary | ICD-10-CM

## 2021-07-13 DIAGNOSIS — Z72.0 TOBACCO ABUSE: ICD-10-CM

## 2021-07-13 PROCEDURE — 99204 OFFICE O/P NEW MOD 45 MIN: CPT | Performed by: INTERNAL MEDICINE

## 2021-07-13 PROCEDURE — 93000 ELECTROCARDIOGRAM COMPLETE: CPT | Performed by: INTERNAL MEDICINE

## 2021-07-13 PROCEDURE — G8427 DOCREV CUR MEDS BY ELIG CLIN: HCPCS | Performed by: INTERNAL MEDICINE

## 2021-07-13 PROCEDURE — G8417 CALC BMI ABV UP PARAM F/U: HCPCS | Performed by: INTERNAL MEDICINE

## 2021-07-13 RX ORDER — METOPROLOL SUCCINATE 25 MG/1
25 TABLET, EXTENDED RELEASE ORAL NIGHTLY
Qty: 90 TABLET | Refills: 3 | Status: ON HOLD | OUTPATIENT
Start: 2021-07-13 | End: 2021-08-21 | Stop reason: HOSPADM

## 2021-07-13 RX ORDER — LISINOPRIL 10 MG/1
10 TABLET ORAL DAILY
Qty: 90 TABLET | Refills: 3 | Status: ON HOLD | OUTPATIENT
Start: 2021-07-13 | End: 2021-08-21 | Stop reason: HOSPADM

## 2021-07-13 NOTE — LETTER
[address]   Cardiac Followup    Referring Provider:  JANET Mckoy CNP     Chief Complaint   Patient presents with    New Patient      Subjective: Mr. Wanda Conroy is a new patient to me today Referred by Jonathan Kincaid for abnormal ECHO; c/o SOB today    History of Present Illness:  Brie Conklin is a 60 y.o. male who is a new patient to me. He is referred by Carmen Gupta for abnormal ECHO. 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 Lori Arambula. Most recent CTA head and neck 5/21/21 No flow-limiting arterial stenosis in the head and neck. Most recent ECHO 6/11/21 EF=40-45%. Global LV HK; grade I DD with normal LV filling pressure. Normal LV size and wall thickness. No significant valvular heart disease. Most recent EKG 6/22/21 NSR; 77bpm.  He had Fem-Fem bypass on 6/28/20 for R. iliac occlusion and severe claudication with Dr. Lori Arambula. Most recent EKG in office today 7/13/21 SR; 93bpm; PVC's (PVC's new compared to 6/22/21 EKG). Today reports a few weeks ago lost all feeling in his right leg. Prior he had calf pain with exertion for 3-4 years. He presented to hospital in May and testing led to eventual revascularization of peripheral arteries. He reports feeling better post Fem-Fem bypass. His activity is limited due to being blindness and claudication prior. He has SOB with activity. He denies chest pain, edema, dizziness, palpitations and syncope. He has cut back to 4 cigs/day (prior 1ppd x several years). Past Medical History:   has a past medical history of Blind in both eyes, Constipation, Histoplasmosis, Hyperlipidemia, , and PVD (peripheral vascular disease) ([address] Surgical History:   has a past surgical history that includes eye surgery (Bilateral) and Femoral-femoral Bypass Graft (Bilateral, 6/28/2021). Social History:  He is . Lives in [facility]. Has 2 children.  On disability- legally blind related to histoplasmosis. He currently smoking 4 cigarettes daily. Occasional alcohol use    reports that he has been smoking cigarettes. He has been smoking about 0.25 packs per day. He has never used smokeless tobacco. He reports previous alcohol use. He reports that he does not use drugs. Family History:  Sister valve replacement in 63's  family history includes Alzheimer's Disease in his mother; Dementia in his father. Home Medications:  Prior to Admission medications    Medication Sig Start Date End Date Taking? Authorizing Provider   vitamin B-12 (CYANOCOBALAMIN) 1000 MCG tablet Take 1,000 mcg by mouth daily   Yes Historical Provider, MD   aspirin 81 MG EC tablet Take 1 tablet by mouth daily  Patient taking differently: Take 81 mg by mouth nightly  5/23/21  Yes Gisela Burkett MD   atorvastatin (LIPITOR) 80 MG tablet Take 0.5 tablets by mouth nightly 5/22/21  Yes Gisela Burkett MD   acetaminophen (APAP EXTRA STRENGTH) 500 MG tablet Take 2 tablets by mouth every 6 hours as needed for Pain 5/22/21  Yes Gisela Burkett MD        Allergies:  Patient has no known allergies. Review of Systems:   · Constitutional: there has been no unanticipated weight loss. There's been no change in energy level, sleep pattern, or activity level. · Eyes: No visual changes or diplopia. No scleral icterus. · ENT: No Headaches, hearing loss or vertigo. No mouth sores or sore throat. · Cardiovascular: Reviewed in HPI  · Respiratory: No cough or wheezing, no sputum production. No hematemesis. · Gastrointestinal: No abdominal pain, appetite loss, blood in stools. No change in bowel or bladder habits. · Genitourinary: No dysuria, trouble voiding, or hematuria. · Musculoskeletal:  No gait disturbance, weakness or joint complaints. · Integumentary: No rash or pruritis. · Neurological: No headache, diplopia, change in muscle strength, numbness or tingling.  No change in gait, balance, coordination, mood, affect, memory, mentation, behavior. · Psychiatric: No anxiety, no depression. · Endocrine: No malaise, fatigue or temperature intolerance. No excessive thirst, fluid intake, or urination. No tremor. · Hematologic/Lymphatic: No abnormal bruising or bleeding, blood clots or swollen lymph nodes. · Allergic/Immunologic: No nasal congestion or hives. Physical Examination:    Vitals:    07/13/21 1316   BP: 130/68   Pulse: 104   SpO2: 98%        Constitutional and General Appearance: NAD   Respiratory:  · Normal excursion and expansion without use of accessory muscles  · Resp Auscultation: Normal breath sounds without dullness  Cardiovascular:  · The apical impulses not displaced  · Heart tones are crisp and normal  · Cervical veins are not engorged  · The carotid upstroke is normal in amplitude and contour without delay or bruit  · Irregularly irregular Normal S1S2, No S3, No Murmur  · Peripheral pulses are symmetrical and full  · There is no clubbing, cyanosis of the extremities. · No edema  · Femoral Arteries: 2+ and equal  · Pedal Pulses: 2+ and equal   Abdomen:  · No masses or tenderness  · Liver/Spleen: No Abnormalities Noted  Neurological/Psychiatric:  · Alert and oriented in all spheres  · Moves all extremities well  · Exhibits normal gait balance and coordination  · No abnormalities of mood, affect, memory, mentation, or behavior are noted        Skin: warm and dry    Lab Results   Component Value Date    CHOL 230 (H) 05/22/2021     Lab Results   Component Value Date    TRIG 158 (H) 05/22/2021     Lab Results   Component Value Date    HDL 31 (L) 05/22/2021     Lab Results   Component Value Date    LDLCALC 167 (H) 05/22/2021     Lab Results   Component Value Date    LABVLDL 32 05/22/2021     No results found for: CHOLHDLRATIO    Assessment:     1. Cardiomyopathy, unspecified type ([facility] [address]): Undefined etiology. Most recent ECHO 6/11/21 EF=40-45%. Global LV HK; grade I DD with normal LV filling pressure. Normal LV size and wall thickness. No significant valvular heart disease. Concerned for ischemia possibly given PVD and CAD risk factors. I d/w Dr. Pratima Street and he prefers no possibility of intervention in groin to let graft heal. We can use radial artery but if emergency occurs and needs possible impella device this could compromise surgery. He is also concerned about strong anticoagulant use as well. I will risk stratify with lexiscan nuclear stress test to see if more evidence to do diagnostic angiogram or can treat medically aggressively. Will start BB and ACE-I for LV dysfunction. 2. S/P L-R femoral-femoral bypass surgery: Performed on 6/28/21 Dr. Pratima Street due to severe claudication, numbness, and Right iliac occlusion. He is feeling better overall. 3. Tobacco abuse: Strongly encouraged to quit entirely. He has cut back to 4 cigs/day and will try to quit entirely in near future. 4. Other hyperlipidemia: Most recent lipids 5/22/21. I personally reviewed lab results in epic from 5/22/21 (see above) and discussed with patient. Suboptimal lipid control. He just started lipitor 40mg daily in May. Will recheck labs in near future and adjust accordingly. I want to be aggressive and lower as much as possible. Plan:  1. I spoke to Dr. Pratima Street pt had Fem-Fem bypass 6/28/21. Dr. Pratima Street would like for grafts to heal before [facility]. Considering new finding of cardiomyopathy, PVD, and SOB with exertion. Will check Lexiscan myoview stress test to assess for myocardial ischemia. . Call central scheduling [phone] to schedule testing. 2. Will reassess Fasting lipids and LFT's before stress test. He was started on Lipitor in May. 3. Someone from my office will call with test result. 4. Will start Toprol 25mg nightly and lisinopril 10mg daily   5.  Follow up with me in 4-5 weeks     This note was scribed in the presence of Cass Thornton MD by Anupama Echols RN     I, Dr. Sissy Young, personally performed the services described in this documentation, as scribed by the above signed scribe in my presence. It is both accurate and complete to my knowledge. I agree with the details independently gathered by the clinical support staff, while the remaining scribed note accurately describes my personal service to the patient. Cost of prescription medications and patient compliance have been reviewed with patient. All questions answered. Thank you for allowing me to participate in the care of this individual.    Donn Caicedo.  Saritha Cardoza M.D., [facility]

## 2021-07-16 ENCOUNTER — OFFICE VISIT (OUTPATIENT)
Dept: VASCULAR SURGERY | Age: 66
End: 2021-07-16

## 2021-07-16 VITALS — WEIGHT: 226 LBS | BODY MASS INDEX: 34.25 KG/M2 | HEIGHT: 68 IN

## 2021-07-16 DIAGNOSIS — Z09 POSTOPERATIVE EXAMINATION: Primary | ICD-10-CM

## 2021-07-16 PROCEDURE — 99024 POSTOP FOLLOW-UP VISIT: CPT | Performed by: SURGERY

## 2021-07-16 NOTE — PROGRESS NOTES
Postop Progress Note    Subjective    Daniel Meloniee Freeze. presents to the office for postop follow up. RLE numbness resolved. Objective    There were no vitals filed for this visit. General: alert, cooperative and no distress  Incisions: healing well      R DUNCAN 0.66 (was 0.33)    Assessment  Doing well postoperatively. Flow improved. Discussed with patient - likely will have claudciation due to bilateral SFA occlusions but rest pain resolved. Plan  Encouraged walking.   F/u prn    Electronically signed by Harris Means MD on 7/16/2021 at 9:57 AM

## 2021-07-30 ENCOUNTER — HOSPITAL ENCOUNTER (OUTPATIENT)
Dept: CARDIOLOGY | Age: 66
Discharge: HOME | End: 2021-07-30
Payer: MEDICARE

## 2021-07-30 ENCOUNTER — TELEPHONE (OUTPATIENT)
Dept: CARDIOLOGY CLINIC | Age: 66
End: 2021-07-30

## 2021-07-30 ENCOUNTER — HOSPITAL ENCOUNTER (OUTPATIENT)
Age: 66
Discharge: HOME | End: 2021-07-30
Payer: MEDICARE

## 2021-07-30 DIAGNOSIS — I42.9 CARDIOMYOPATHY, UNSPECIFIED TYPE (HCC): ICD-10-CM

## 2021-07-30 DIAGNOSIS — Z72.0 TOBACCO ABUSE: ICD-10-CM

## 2021-07-30 DIAGNOSIS — I15.9 SECONDARY HYPERTENSION: ICD-10-CM

## 2021-07-30 DIAGNOSIS — R06.02 SOB (SHORTNESS OF BREATH) ON EXERTION: ICD-10-CM

## 2021-07-30 DIAGNOSIS — E78.49 OTHER HYPERLIPIDEMIA: ICD-10-CM

## 2021-07-30 DIAGNOSIS — Z95.828 S/P FEMORAL-FEMORAL BYPASS SURGERY: ICD-10-CM

## 2021-07-30 LAB
ALBUMIN SERPL-MCNC: 4.1 G/DL (ref 3.4–5)
ALP BLD-CCNC: 58 U/L (ref 40–129)
ALT SERPL-CCNC: 17 U/L (ref 10–40)
AST SERPL-CCNC: 19 U/L (ref 15–37)
BILIRUB SERPL-MCNC: 0.6 MG/DL (ref 0–1)
BILIRUBIN DIRECT: <0.2 MG/DL (ref 0–0.3)
BILIRUBIN, INDIRECT: NORMAL MG/DL (ref 0–1)
CHOLESTEROL, FASTING: 124 MG/DL (ref 0–199)
HDLC SERPL-MCNC: 46 MG/DL (ref 40–60)
LDL CHOLESTEROL CALCULATED: 56 MG/DL
LV EF: 40 %
LVEF MODALITY: NORMAL
TOTAL PROTEIN: 7.4 G/DL (ref 6.4–8.2)
TRIGLYCERIDE, FASTING: 112 MG/DL (ref 0–150)
VLDLC SERPL CALC-MCNC: 22 MG/DL

## 2021-07-30 PROCEDURE — 80076 HEPATIC FUNCTION PANEL: CPT

## 2021-07-30 PROCEDURE — 78452 HT MUSCLE IMAGE SPECT MULT: CPT

## 2021-07-30 PROCEDURE — 80061 LIPID PANEL: CPT

## 2021-07-30 PROCEDURE — 93017 CV STRESS TEST TRACING ONLY: CPT

## 2021-07-30 PROCEDURE — 3430000000 HC RX DIAGNOSTIC RADIOPHARMACEUTICAL: Performed by: INTERNAL MEDICINE

## 2021-07-30 PROCEDURE — A9502 TC99M TETROFOSMIN: HCPCS | Performed by: INTERNAL MEDICINE

## 2021-07-30 PROCEDURE — 6360000002 HC RX W HCPCS: Performed by: INTERNAL MEDICINE

## 2021-07-30 PROCEDURE — 36415 COLL VENOUS BLD VENIPUNCTURE: CPT

## 2021-07-30 RX ADMIN — TETROFOSMIN 32 MILLICURIE: 1.38 INJECTION, POWDER, LYOPHILIZED, FOR SOLUTION INTRAVENOUS at 14:05

## 2021-07-30 RX ADMIN — TETROFOSMIN 10.7 MILLICURIE: 1.38 INJECTION, POWDER, LYOPHILIZED, FOR SOLUTION INTRAVENOUS at 12:35

## 2021-07-30 RX ADMIN — REGADENOSON 0.4 MG: 0.08 INJECTION, SOLUTION INTRAVENOUS at 14:05

## 2021-07-30 NOTE — TELEPHONE ENCOUNTER
Pt was returning a call regarding his stress test results. Could not find any notes in chart. Please call back with results.

## 2021-08-02 DIAGNOSIS — I20.0 UNSTABLE ANGINA PECTORIS (HCC): Primary | ICD-10-CM

## 2021-08-02 DIAGNOSIS — R94.39 ABNORMAL NUCLEAR STRESS TEST: ICD-10-CM

## 2021-08-02 NOTE — TELEPHONE ENCOUNTER
Spoke with patient. Patient is scheduled with Dr. Fabi Leone for Left Heart Cath on 8/12/21 at 11am (asking for later time) A, arrival time of 9:30am to the Cath Lab. Please have patient arrive to the main entrance of [facility] and check in with the registration desk. Please call patient regarding medication instructions. Remind patient to be NPO after midnight (8 hours prior). Do not apply lotions/creams on skin the day of procedure. COVID testing - Vaccinated. JAM, BRANDIE - sasha.      ----- Message from Sondra Eugene RN sent at 8/2/2021  3:10 PM EDT -----  [place] please schedule cath please see JAM note about radial artery assess      Saritha Castro MD   8/2/2021 12:22 PM EDT   I called and went over abnormal nuc imaging study. Please schedule him for [facility] and is HAS TO BE RADIAL ARTERY ACCESS for cath due to recent vascular surgery in femoral artery. Please let me know who and when test will be done and I will d/w interventional partner personally. Indication for test: abnormal nuc imaging study, hx PVD, SOB. Thanks.

## 2021-08-09 ENCOUNTER — TELEPHONE (OUTPATIENT)
Dept: CARDIOLOGY CLINIC | Age: 66
End: 2021-08-09

## 2021-08-09 NOTE — TELEPHONE ENCOUNTER
Patient informed to take all morning meds with a sip of water only. Be NPO. No lotions/creams on skin.  He VU

## 2021-08-09 NOTE — TELEPHONE ENCOUNTER
Spoke with patient. He states that he had two episodes that lasted for about 10 seconds.   Last one was after he bent over

## 2021-08-09 NOTE — TELEPHONE ENCOUNTER
08/09-Pt called into office stating he woke up this morning with dizziness and fainting feeling.  Pt states he is considered about these feelings due to him having a heart cath procedure on 08/12/2021    Please contact pt @ . [phone] .[phone]

## 2021-08-10 NOTE — TELEPHONE ENCOUNTER
Spoke to pt. He has no way of checking BP @ home. Pt sts that he is either going to contact his PCP or go to the pharmacy to get it check and will contact the office to let us know.

## 2021-08-11 NOTE — TELEPHONE ENCOUNTER
Pt called back cath [facility] for tomorrow 8/12/21  BP 8/10/221 348pm 104/67 HR 93  052pm 105/66 HR 97  feeling fine today no issues. Reassured him these are fine numbers and encouraged to report to [facility] registration in morning.

## 2021-08-12 ENCOUNTER — APPOINTMENT (OUTPATIENT)
Dept: GENERAL RADIOLOGY | Age: 66
End: 2021-08-12
Attending: INTERNAL MEDICINE
Payer: MEDICARE

## 2021-08-12 ENCOUNTER — HOSPITAL ENCOUNTER (OUTPATIENT)
Dept: CARDIAC CATH/INVASIVE PROCEDURES | Age: 66
Discharge: HOME | End: 2021-08-12
Attending: INTERNAL MEDICINE | Admitting: INTERNAL MEDICINE
Payer: MEDICARE

## 2021-08-12 ENCOUNTER — APPOINTMENT (OUTPATIENT)
Dept: VASCULAR LAB | Age: 66
End: 2021-08-12
Attending: INTERNAL MEDICINE
Payer: MEDICARE

## 2021-08-12 VITALS — HEIGHT: 68 IN | BODY MASS INDEX: 31.22 KG/M2 | WEIGHT: 206 LBS

## 2021-08-12 LAB
ANION GAP SERPL CALCULATED.3IONS-SCNC: 13 MMOL/L (ref 3–16)
BUN BLDV-MCNC: 8 MG/DL (ref 7–20)
CALCIUM SERPL-MCNC: 9.6 MG/DL (ref 8.3–10.6)
CHLORIDE BLD-SCNC: 99 MMOL/L (ref 99–110)
CHOLESTEROL, TOTAL: 133 MG/DL (ref 0–199)
CO2: 22 MMOL/L (ref 21–32)
CREAT SERPL-MCNC: 0.7 MG/DL (ref 0.8–1.3)
GFR AFRICAN AMERICAN: >60
GFR NON-AFRICAN AMERICAN: >60
GLUCOSE BLD-MCNC: 102 MG/DL (ref 70–99)
HCT VFR BLD CALC: 40.6 % (ref 40.5–52.5)
HDLC SERPL-MCNC: 45 MG/DL (ref 40–60)
HEMOGLOBIN: 14.4 G/DL (ref 13.5–17.5)
LDL CHOLESTEROL CALCULATED: 65 MG/DL
LV EF: 43 %
LVEF MODALITY: NORMAL
MCH RBC QN AUTO: 35.2 PG (ref 26–34)
MCHC RBC AUTO-ENTMCNC: 35.4 G/DL (ref 31–36)
MCV RBC AUTO: 99.4 FL (ref 80–100)
PDW BLD-RTO: 14.3 % (ref 12.4–15.4)
PLATELET # BLD: 223 K/UL (ref 135–450)
PMV BLD AUTO: 7.2 FL (ref 5–10.5)
POTASSIUM SERPL-SCNC: 3.9 MMOL/L (ref 3.5–5.1)
RBC # BLD: 4.09 M/UL (ref 4.2–5.9)
SODIUM BLD-SCNC: 134 MMOL/L (ref 136–145)
TRIGL SERPL-MCNC: 117 MG/DL (ref 0–150)
VLDLC SERPL CALC-MCNC: 23 MG/DL
WBC # BLD: 6.6 K/UL (ref 4–11)

## 2021-08-12 PROCEDURE — C1769 GUIDE WIRE: HCPCS

## 2021-08-12 PROCEDURE — 93880 EXTRACRANIAL BILAT STUDY: CPT

## 2021-08-12 PROCEDURE — 80048 BASIC METABOLIC PNL TOTAL CA: CPT

## 2021-08-12 PROCEDURE — 71045 X-RAY EXAM CHEST 1 VIEW: CPT

## 2021-08-12 PROCEDURE — 80061 LIPID PANEL: CPT

## 2021-08-12 PROCEDURE — 99205 OFFICE O/P NEW HI 60 MIN: CPT | Performed by: NURSE PRACTITIONER

## 2021-08-12 PROCEDURE — 94010 BREATHING CAPACITY TEST: CPT

## 2021-08-12 PROCEDURE — 93458 L HRT ARTERY/VENTRICLE ANGIO: CPT | Performed by: INTERNAL MEDICINE

## 2021-08-12 PROCEDURE — 85347 COAGULATION TIME ACTIVATED: CPT

## 2021-08-12 PROCEDURE — 93458 L HRT ARTERY/VENTRICLE ANGIO: CPT

## 2021-08-12 PROCEDURE — 2500000003 HC RX 250 WO HCPCS

## 2021-08-12 PROCEDURE — 2580000003 HC RX 258

## 2021-08-12 PROCEDURE — 85027 COMPLETE CBC AUTOMATED: CPT

## 2021-08-12 PROCEDURE — 93971 EXTREMITY STUDY: CPT

## 2021-08-12 PROCEDURE — 2709999900 HC NON-CHARGEABLE SUPPLY

## 2021-08-12 PROCEDURE — 6360000002 HC RX W HCPCS

## 2021-08-12 PROCEDURE — 99152 MOD SED SAME PHYS/QHP 5/>YRS: CPT | Performed by: INTERNAL MEDICINE

## 2021-08-12 PROCEDURE — 93005 ELECTROCARDIOGRAM TRACING: CPT

## 2021-08-12 PROCEDURE — 94799 UNLISTED PULMONARY SVC/PX: CPT

## 2021-08-12 PROCEDURE — C1894 INTRO/SHEATH, NON-LASER: HCPCS

## 2021-08-12 RX ORDER — SODIUM CHLORIDE 0.9 % (FLUSH) 0.9 %
5-40 SYRINGE (ML) INJECTION EVERY 12 HOURS SCHEDULED
Status: DISCONTINUED | OUTPATIENT
Start: 2021-08-12 | End: 2021-08-12 | Stop reason: HOSPADM

## 2021-08-12 RX ORDER — ACETAMINOPHEN 325 MG/1
650 TABLET ORAL EVERY 4 HOURS PRN
Status: DISCONTINUED | OUTPATIENT
Start: 2021-08-12 | End: 2021-08-12 | Stop reason: HOSPADM

## 2021-08-12 RX ORDER — MIDAZOLAM HYDROCHLORIDE 5 MG/ML
INJECTION INTRAMUSCULAR; INTRAVENOUS
Status: COMPLETED | OUTPATIENT
Start: 2021-08-12 | End: 2021-08-12

## 2021-08-12 RX ORDER — SODIUM CHLORIDE 9 MG/ML
25 INJECTION, SOLUTION INTRAVENOUS PRN
Status: DISCONTINUED | OUTPATIENT
Start: 2021-08-12 | End: 2021-08-12 | Stop reason: HOSPADM

## 2021-08-12 RX ORDER — FENTANYL CITRATE 50 UG/ML
INJECTION, SOLUTION INTRAMUSCULAR; INTRAVENOUS
Status: COMPLETED | OUTPATIENT
Start: 2021-08-12 | End: 2021-08-12

## 2021-08-12 RX ORDER — SODIUM CHLORIDE 0.9 % (FLUSH) 0.9 %
5-40 SYRINGE (ML) INJECTION PRN
Status: DISCONTINUED | OUTPATIENT
Start: 2021-08-12 | End: 2021-08-12 | Stop reason: HOSPADM

## 2021-08-12 RX ORDER — SODIUM CHLORIDE 9 MG/ML
1000 INJECTION, SOLUTION INTRAVENOUS CONTINUOUS
Status: DISCONTINUED | OUTPATIENT
Start: 2021-08-12 | End: 2021-08-12 | Stop reason: HOSPADM

## 2021-08-12 RX ADMIN — MIDAZOLAM HYDROCHLORIDE 2 MG: 5 INJECTION INTRAMUSCULAR; INTRAVENOUS at 11:10

## 2021-08-12 RX ADMIN — FENTANYL CITRATE 25 MCG: 50 INJECTION, SOLUTION INTRAMUSCULAR; INTRAVENOUS at 11:10

## 2021-08-12 NOTE — CONSULTS
Department of Cardiovascular & Thoracic Surgery  History and Physical          DIAGNOSIS: Multivessel CAD     CHIEF COMPLAINT:  No chief complaint on file. History Obtained From:  patient, spouse, electronic medical record    HISTORY OF PRESENT ILLNESS:      The patient is a 60 y.o. 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 Dr. Alma Rosa Farrell was consulted and performed a Fem-Fem bypass on 6/28/21. During his work up an echocardiogram was done and revealed a reduced EF of 40-45%. He presented today for a scheduled outpatient cardiac catheterization that revealed multivessel CAD not amenable to PCI. We have been consulted for surgical revascularization.      Past Medical History:        Diagnosis Date    Blind in both eyes     legally blind - has minimal peripheral vision right eye    Constipation     Histoplasmosis     Hyperlipidemia     PVD (peripheral vascular disease) (HCC)        Past Surgical History:        Procedure Laterality Date    EYE SURGERY Bilateral     general anesthesia x1     FEMORAL-FEMORAL BYPASS GRAFT Bilateral 6/28/2021    FEMORAL TO FEMORAL BYPASS GRAFT WITH RIGHT LOWER EXTREMITY ANGIOGRAM performed by Savannah Sexton MD at [facility]       Medications Prior to Admission:   Medications Prior to Admission: metoprolol succinate (TOPROL XL) 25 MG extended release tablet, Take 1 tablet by mouth nightly  lisinopril (PRINIVIL;ZESTRIL) 10 MG tablet, Take 1 tablet by mouth daily  vitamin B-12 (CYANOCOBALAMIN) 1000 MCG tablet, Take 1,000 mcg by mouth daily  aspirin 81 MG EC tablet, Take 1 tablet by mouth daily (Patient taking differently: Take 81 mg by mouth nightly )  atorvastatin (LIPITOR) 80 MG tablet, Take 0.5 tablets by mouth nightly  acetaminophen (APAP EXTRA STRENGTH) 500 MG tablet, Take 2 tablets by mouth every 6 hours as needed for Pain    Allergies:  Patient has no known normal, no murmur, click, rub or gallop. Apical impulse in 5th intercostal space. Pulses:  Right dorsalis pedis 1, Left dorsalis pedis 1, Right posterior tibial doppler, Left Posterior tibial doppler, Right radial 1, and Left radial 1. Abdomen:  normal bowel sounds, non-tender, unable to appreciate aorta 2/2 body habitus. No hepatosplenomegaly or masses. Musculoskeletal:  Back is straight and non-tender, full ROM of upper and lower extremities. No kyphosis or scoliosis. Extremities:  Warm, pink, no clubbing, cyanosis, petechiae, ischemia, or deformities. No peripheral edema. Skin: no rashes, no ecchymoses, no petechiae, no nodules, no jaundice    Neurological/Psychiatric: oriented, normal mood, numbness of RLE has resolved except for on the bottom of his right foot, still has slight numbness/tingling.      DATA:  EK21 Normal sinus rhythm Normal ECG When compared with ECG of 2021 14:58, No significant change was found     CBC with Differential:    Lab Results   Component Value Date    WBC 6.6 2021    RBC 4.09 2021    HGB 14.4 2021    HCT 40.6 2021     2021    MCV 99.4 2021    MCH 35.2 2021    MCHC 35.4 2021    RDW 14.3 2021    LYMPHOPCT 36.6 2021    MONOPCT 8.7 2021    BASOPCT 1.3 2021    MONOSABS 0.7 2021    LYMPHSABS 2.9 2021    EOSABS 0.1 2021    BASOSABS 0.1 2021     CMP:    Lab Results   Component Value Date     2021    K 3.9 2021    K 4.2 2021    CL 99 2021    CO2 22 2021    BUN 8 2021    CREATININE 0.7 2021    GFRAA >60 2021    AGRATIO 1.3 2021    LABGLOM >60 2021    GLUCOSE 102 2021    PROT 7.4 2021    LABALBU 4.1 2021    CALCIUM 9.6 2021    BILITOT 0.6 2021    ALKPHOS 58 2021    AST 19 2021    ALT 17 2021     Hepatic Function Panel:    Lab Results   Component Value Date    ALKPHOS 58 07/30/2021    ALT 17 07/30/2021    AST 19 07/30/2021    PROT 7.4 07/30/2021    BILITOT 0.6 07/30/2021    BILIDIR <0.2 07/30/2021    IBILI see below 07/30/2021    LABALBU 4.1 07/30/2021     PT/INR:    Lab Results   Component Value Date    PROTIME 12.8 06/22/2021    INR 1.10 06/22/2021     Last 3 Troponin:    Lab Results   Component Value Date    TROPONINI <0.01 10/12/2020       CXR: 5/21/21   Impression   Enlarged cardiac silhouette. ECHO:  6/11/21 with Dr. Gasper Seip   Summary:    A bubble study was performed and fails to show evidence of shunting. Left ventricular systolic function is mildly impaired with ejection fraction estimated at 40-45%. Global left ventricular hypokinesis. Grade I diastolic dysfunction with normal left ventricular filling pressure. Normal left ventricular size and wall thickness. No significant valvular heart disease. MRI brain: 5/21/21   Impression       1. No acute intracranial abnormality. No evidence of acute ischemia. 2. Mild left mastoid air cell disease. 3. Focal area of susceptibility artifact with adjacent abnormal FLAIR signal presumably related to a remote small hemorrhagic infarct. 4. Nonspecific periventricular and subcortical white matter signal abnormalities presumably related to chronic small vessel ischemic disease and/or age related degenerative change.           C: 8/12/21 with Dr. Griselda Hopper  LVEDP  12   GRADIENT ACROSS AORTIC VALVE  none   LV FUNCTION EF 40-45%   WALL MOTION  apical hypokinesis   MITRAL REGURGITATION  mild      CORONARY ARTERIES  LM Less than 10% proximalmiddistal stenosis            LAD  Calcified, proximal 80% stenosis followed by middistal 100% . There are well-developed right to left collaterals.     D1 is a small to medium sized vessel, this could also be described as a ramus artery, it has proximalmiddistal 40 to 50% stenosis.      D2 is A large vessel with proximalmid 80 to 90% stenosis.       LCX Large vessel, proximal 40 to 50% stenosis, middistal into OM 2 which is the largest OM, there is a 60 to 70% proximalmiddistal stenosis.       RCA Dominant, large vessel, proximal 60 to 70% stenosis, middistal 40% stenosis. There are well-developed right to left collaterals to LAD.     PDA is a medium size vessel with proximalmiddistal 60 to 70% diffuse stenosis. PLV is a large vessel with ostial/proximal 50% stenosis, middistal 40 to 50% stenosis         [id number] Score for Atrial Fibrillation Stroke Risk   Risk   Factors  Component Value   C CHF No 0   H HTN Yes 1   A2 Age >= 76 No,  (63 y.o.) 0   D DM No 0   S2 Prior Stroke/TIA Yes 2   V Vascular Disease No 0   A Age 74-69 No,  (63 y.o.) 0   Sc Sex male 0    [id number]  Score  3   Score last updated 8/12/21 53:12 PM EDT    Click here for a link to the UpToDate guideline \"Atrial Fibrillation: Anticoagulation therapy to prevent embolization    Disclaimer: Risk Score calculation is dependent on accuracy of patient problem list and past encounter diagnosis. ASSESSMENT AND PLAN:  STS Cardiac Surgery Risk profile: CABG    Mortality:  1.08%  Renal Failure:  1.08%  Permanent Stroke:  0.61%  Prolonged Ventilation:  6.99%  Deep Sternal Infection:  0.49%  Reoperation:  1.82%  Morbidity and Mortality:  9.90%  Short LOS:  46.55%  Long LOS:  4.84%    Pt is a 60 y.o. male current smoker that was found to have multivessel CAD during his cardiac cath today. We will conduct pre-op testing this afternoon in anticipation of him discharging home later today. He will come back Tuesday for his surgery (he will be the second case). All questions answered at bedside. Carotid duplex. Vein mapping. Bedside PFTs. CXR.     JANET Hernandez CNP  8/12/2021  12:20 PM

## 2021-08-12 NOTE — PROCEDURES
CARDIAC CATHETERIZATION REPORT     Procedure Date:  2021  Patient Name: Miracle Patel. MRN: [id number] : 1955      INDICATION     Ischemic or myopathy  High risk abnormal stress test    PROCEDURES PERFORMED     Left heart catheterization  LVgram  Coronary angiogam  Coronary cath  Monitoring of moderate conscious sedation          PROCEDURE DESCRIPTION   Risks/benefits/alternatives/outcomes were discussed with patient and/or family and informed consent was obtained. Using the [facility] scale, the patient's right radial artery was found to be a level B. Patient was prepped draped in the usual sterile fashion. Local anaesthetic was applied over puncture site. Using a back wall technique, a 6 [place] Terumo sheath was inserted into right radial artery. Verapamil, nitroglycerin, nicardipine were administered through the sheath. Heparin was administered. Diagnostic 5 Ukrainian pigtail, ultra catheters were used for diagnostic angiograms. At the conclusion of the procedure, a TR band was placed over the puncture site and hemostasis was obtained. There were no immediate complications. I supervised sedation with versed 2 mg/fentanyl 25 mcg during the procedure. 140 cc contrast was utilized. <20cc EBL. FINDINGS       LVGRAM    LVEDP  12   GRADIENT ACROSS AORTIC VALVE  none   LV FUNCTION EF 40-45%   WALL MOTION  apical hypokinesis   MITRAL REGURGITATION  mild       CORONARY ARTERIES    LM Less than 10% proximalmiddistal stenosis         LAD  Calcified, proximal 80% stenosis followed by middistal 100% . There are well-developed right to left collaterals. D1 is a small to medium sized vessel, this could also be described as a ramus artery, it has proximalmiddistal 40 to 50% stenosis. D2 is A large vessel with proximalmid 80 to 90% stenosis.        LCX Large vessel, proximal 40 to 50% stenosis, middistal into OM 2 which is the largest OM, there is a 60 to 70%

## 2021-08-12 NOTE — H&P
Brief Pre-Op Note/Sedation Assessment      Daniel Garcia.  1955  Cath Pool Rm/NONE      [id number]  10:46 AM    Planned Procedure: Cardiac Catheterization Procedure    Post Procedure Plan: Return to same level of care    Consent: I have discussed with the patient and/or the patient representative the indication, alternatives, and the possible risks and/or complications of the planned procedure and the anesthesia methods. The patient and/or patient representative appear to understand and agree to proceed. DISCUSSION OF CARDIAC CATHETERIZATION PROCEDURES: The procedures, indications, risks and alternatives have been discussed with the patient and, as appropriate, with the patient's guardian . Risks discussed included, but are not limited to, bleeding, development of blood clots/emboli, damage to blood vessels, renal failure, malignant cardiac arrhythmias, stroke, heart attack, emergent coronary bypass surgery, death, dye allergy. The patient (and guardian as appropriate) expressed understanding of the aforementioned and wished to proceed. Chief Complaint: Anginal Equivalent  Dyspnea      Indications for Cath Procedure: Worsening Angina and Cardiomyopathy  Anginal Classification within 2 weeks:  CCS III - Symptoms with everyday living activities, i.e., moderate limitation  NYHA Heart Failure Class within 2 weeks: Class III - Symptoms of HF on less-than-ordinary exertion, Newly Diagnosed?  Yes, Heart Failure Type: Systolic  Is Cath Lab Visit Valve-related?: No  Surgical Risk: N/A  Functional Type: N/A    Anti- Anginal Meds within 2 weeks:   Yes: Beta Blockers, Aspirin, Non-Statin (Any) and Statin (Any)    Stress or Imaging Studies Performed (within 6 months):  Stress Test with SPECT Result: Positive:  anteroseptal Risk/Extent of Ischemia:  High     Vital Signs:  Ht 5' 8\" (1.727 m)   Wt 206 lb (93.4 kg)   BMI 31.32 kg/m²     /70  HR 83    Allergies:  No Known Allergies    Past Medical History:  Past Medical History:   Diagnosis Date    Blind in both eyes     legally blind - has minimal peripheral vision right eye    Constipation     Histoplasmosis     Hyperlipidemia     PVD (peripheral vascular disease) ([facility] [address]          Surgical History:  Past Surgical History:   Procedure Laterality Date    EYE SURGERY Bilateral     general anesthesia x1     FEMORAL-FEMORAL BYPASS GRAFT Bilateral 6/28/2021    FEMORAL TO FEMORAL BYPASS GRAFT WITH RIGHT LOWER EXTREMITY ANGIOGRAM performed by Elva Bourgeois MD at [facility] OR         Medications:  Current Facility-Administered Medications   Medication Dose Route Frequency Provider Last Rate Last Admin    0.9 % sodium chloride infusion  1,000 mL Intravenous Continuous Elizabeth West MD               Pre-Sedation:    Pre-Sedation Documentation and Exam:  I have assessed the patient and agree with the H&P present on the chart. Prior History of Anesthesia Complications:   none    Modified Mallampati:  III (soft palate, base of uvula visible)    ASA Classification:  Class 3 - A patient with severe systemic disease that limits activity but is not incapacitating      Hung Scale: Activity:  2 - Able to move 4 extremities voluntarily on command  Respiration:  2 - Able to breathe deeply and cough freely  Circulation:  2 - BP+/- 20mmHg of normal  Consciousness:  2 - Fully awake  Oxygen Saturation (color):  2 - Able to maintain oxygen saturation >92% on room air    Sedation/Anesthesia Plan:  Guard the patient's safety and welfare. Minimize physical discomfort and pain. Minimize negative psychological responses to treatment by providing sedation and analgesia and maximize the potential amnesia. Patient to meet pre-procedure discharge plan.     Medication Planned:  midazolam intravenously and fentanyl intravenously    Patient is an appropriate candidate for plan of sedation: yes      Electronically signed by Elizabeth West MD on 8/12/2021 at 10:46 AM

## 2021-08-13 ENCOUNTER — TELEPHONE (OUTPATIENT)
Dept: CARDIOTHORACIC SURGERY | Age: 66
End: 2021-08-13

## 2021-08-13 DIAGNOSIS — I25.10 CORONARY ARTERY DISEASE INVOLVING NATIVE CORONARY ARTERY OF NATIVE HEART, UNSPECIFIED WHETHER ANGINA PRESENT: Primary | ICD-10-CM

## 2021-08-13 LAB
EKG ATRIAL RATE: 85 BPM
EKG DIAGNOSIS: NORMAL
EKG P AXIS: 39 DEGREES
EKG P-R INTERVAL: 164 MS
EKG Q-T INTERVAL: 368 MS
EKG QRS DURATION: 78 MS
EKG QTC CALCULATION (BAZETT): 437 MS
EKG R AXIS: 30 DEGREES
EKG T AXIS: 51 DEGREES
EKG VENTRICULAR RATE: 85 BPM

## 2021-08-13 PROCEDURE — 93010 ELECTROCARDIOGRAM REPORT: CPT | Performed by: INTERNAL MEDICINE

## 2021-08-13 RX ORDER — CHLORHEXIDINE GLUCONATE 213 G/1000ML
SOLUTION TOPICAL
Qty: 1 BOTTLE | Refills: 0 | Status: ON HOLD | OUTPATIENT
Start: 2021-08-13 | End: 2021-08-21 | Stop reason: HOSPADM

## 2021-08-13 RX ORDER — CHLORHEXIDINE GLUCONATE 0.12 MG/ML
15 RINSE ORAL ONCE
Qty: 15 ML | Refills: 0 | Status: SHIPPED | OUTPATIENT
Start: 2021-08-13 | End: 2021-08-13

## 2021-08-13 NOTE — PROGRESS NOTES
Clarice Le at Dr. Anne Frank office will call in preop prescriptions for patient's surgery 8/17/21 to confirmed pharmacy in [facility]

## 2021-08-13 NOTE — PROGRESS NOTES
Preoperative Screening for Elective Surgery/Invasive Procedures While COVID-19 present in the community     Have you had any of the following symptoms?none  o Fever, chills  o Cough  o Shortness of breath  o Muscle aches/pain  o Diarrhea  o Abdominal pain, nausea, vomiting  o Loss or decrease in taste and / or smell   Risk of Exposure  o Have you recently been hospitalized for COVID-19 or flu-like illness, if so when?no  o Recently diagnosed with COVID-19, if so when?no  o Recently tested for COVID-19, if so when?no  o Have you been in close contact with a person or family member who currently has or recently had COVID-19? If yes, when and in what context?no  o Do you live with anybody who in the last 14 days has had fever, chills, shortness of breath, muscle aches, flu-like illness?no  o Do you have any close contacts or family members who are currently in the hospital for COVID-19 or flu-like illness? If yes, assess recent close contact with this person. no    Indicate if the patient has a positive screen by answering yes to one or more of the above questions. Patients who test positive or screen positive prior to surgery or on the day of surgery should be evaluated in conjunction with the surgeon/proceduralist/anesthesiologist to determine the urgency of the procedure.      2nd covid vaccine 4/10/21

## 2021-08-13 NOTE — PROGRESS NOTES
Daniel Martinez. Age 60 y.o.    male    1955    MRN [id number]    8/17/2021  Arrival Time_____________  OR Time____________235 Min     Procedure(s):  CORONARY ARTERY BYPASS GRAFTING X4, INTERNAL MAMMARY ARTERY, SAPHENOUS VEIN GRAFT, ON PUMP WITH LEFT ATRIAL APPENDAGE CLIP  PLACEMENT                      General     Surgeon(s):  Anupam Swanson, MD      DAY ADMIT ___  SDS/OP ___  OUTPT IN BED ___         Phone [phone] (home)    PCP _____________________ Phone_________________ Epic ( ) Epic CE ( ) Appt ________    ADDITIONAL INFO __________________________________ Cardio/Consult _____________    NOTES _____________________________________________________________________    ____________________________________________________________________________    PAT APPT DATE:________ TIME: ________  FAXED QAD: _______  (__) H&P w/ hospitalist  ____________________________________________________________________________    COVID TEST: Date/Location______________        NURSING HISTORY COMPLETE: _______  (__) CBC       (__) W/ DIFF ___________  (__)  ECHO    __________  (__) Hgb A1C    ___________  (__) CHEST X RAY   __________  (__) LIPID PROFILE  ___________  (__) EKG   __________  (__) PT/PTT   ___________  (__) PFT's   __________  (__) BMP   ___________  (__) CAROTIDS  __________  (__) CMP   ___________  (__) VEIN MAPPING  __________  (__) U/A   ___________  (__) HISTORY & PHYSICAL __________  (__) URINE C & S  ___________  (__) CARDIAC CLEARANCE __________  (__) U/A W/ FLEX  ___________  (__) PULM.  CLEARANCE __________  (__) SERUM PREGNANCY ___________  (__) Check Epic DOS orders __________  (__) TYPE & SCREEN ________ repeat ( ) (__)  __________________ __________  (__) ALBUMIN   ___________  (__)  __________________ __________  (__) TRANSFERRIN  ___________  (__)  __________________ __________  (__) LIVER PROFILE  ___________  (__)  __________________ __________  (__) CARBOXY HGB  ___________  (__) URINE PREG DOS __________  (__) NICOTINE & MET.  ___________  (__) BLOOD SUGAR DOS __________  (__) PREALBUMIN  ___________    (__) MRSA NASAL SWAB ___________  (__) BLOOD THINNERS __________  (__) ACE/ ARBS: _____________________    (__) BETABLOCKERS ___________________

## 2021-08-13 NOTE — TELEPHONE ENCOUNTER
Spoke with patient regarding surgery scheduled for 8/17/2021 at 11:00 am with arrival time of 9:00 am at [facility] with Dr. Casey Gave to include: coronary artery bypass graft x 4 with left atrial appendage clip placement. Patient completed all pre-op lab work on 8/12/2021. Patient has been fully vaccinated and does not require a COVID swab prior to surgery. Patient has been instructed not to eat or drink after midnight the day of surgery and to call our office with any questions or concerns.

## 2021-08-16 ENCOUNTER — HOSPITAL ENCOUNTER (EMERGENCY)
Age: 66
Discharge: HOME | DRG: 236 | End: 2021-08-16
Attending: EMERGENCY MEDICINE
Payer: MEDICARE

## 2021-08-16 ENCOUNTER — ANESTHESIA EVENT (OUTPATIENT)
Dept: OPERATING ROOM | Age: 66
DRG: 236 | End: 2021-08-16
Payer: MEDICARE

## 2021-08-16 ENCOUNTER — APPOINTMENT (OUTPATIENT)
Dept: GENERAL RADIOLOGY | Age: 66
DRG: 236 | End: 2021-08-16
Payer: MEDICARE

## 2021-08-16 VITALS
HEIGHT: 68 IN | DIASTOLIC BLOOD PRESSURE: 63 MMHG | HEART RATE: 83 BPM | WEIGHT: 206 LBS | TEMPERATURE: 98 F | RESPIRATION RATE: 13 BRPM | SYSTOLIC BLOOD PRESSURE: 102 MMHG | BODY MASS INDEX: 31.22 KG/M2 | OXYGEN SATURATION: 98 %

## 2021-08-16 DIAGNOSIS — R68.84 JAW PAIN: Primary | ICD-10-CM

## 2021-08-16 LAB
A/G RATIO: 1.2 (ref 1.1–2.2)
ALBUMIN SERPL-MCNC: 4.1 G/DL (ref 3.4–5)
ALP BLD-CCNC: 62 U/L (ref 40–129)
ALT SERPL-CCNC: 17 U/L (ref 10–40)
ANION GAP SERPL CALCULATED.3IONS-SCNC: 12 MMOL/L (ref 3–16)
AST SERPL-CCNC: 19 U/L (ref 15–37)
BASOPHILS ABSOLUTE: 0.1 K/UL (ref 0–0.2)
BASOPHILS RELATIVE PERCENT: 1 %
BILIRUB SERPL-MCNC: 0.4 MG/DL (ref 0–1)
BUN BLDV-MCNC: 9 MG/DL (ref 7–20)
CALCIUM SERPL-MCNC: 9.6 MG/DL (ref 8.3–10.6)
CHLORIDE BLD-SCNC: 103 MMOL/L (ref 99–110)
CO2: 23 MMOL/L (ref 21–32)
CREAT SERPL-MCNC: 0.8 MG/DL (ref 0.8–1.3)
EKG ATRIAL RATE: 108 BPM
EKG ATRIAL RATE: 89 BPM
EKG DIAGNOSIS: NORMAL
EKG DIAGNOSIS: NORMAL
EKG P AXIS: 44 DEGREES
EKG P AXIS: 45 DEGREES
EKG P-R INTERVAL: 158 MS
EKG P-R INTERVAL: 160 MS
EKG Q-T INTERVAL: 334 MS
EKG Q-T INTERVAL: 372 MS
EKG QRS DURATION: 84 MS
EKG QRS DURATION: 92 MS
EKG QTC CALCULATION (BAZETT): 447 MS
EKG QTC CALCULATION (BAZETT): 452 MS
EKG R AXIS: 40 DEGREES
EKG R AXIS: 57 DEGREES
EKG T AXIS: 43 DEGREES
EKG T AXIS: 46 DEGREES
EKG VENTRICULAR RATE: 108 BPM
EKG VENTRICULAR RATE: 89 BPM
EOSINOPHILS ABSOLUTE: 0.1 K/UL (ref 0–0.6)
EOSINOPHILS RELATIVE PERCENT: 1.7 %
GFR AFRICAN AMERICAN: >60
GFR NON-AFRICAN AMERICAN: >60
GLOBULIN: 3.4 G/DL
GLUCOSE BLD-MCNC: 125 MG/DL (ref 70–99)
HCT VFR BLD CALC: 40 % (ref 40.5–52.5)
HEMOGLOBIN: 14 G/DL (ref 13.5–17.5)
LYMPHOCYTES ABSOLUTE: 2.5 K/UL (ref 1–5.1)
LYMPHOCYTES RELATIVE PERCENT: 30.8 %
MCH RBC QN AUTO: 35.2 PG (ref 26–34)
MCHC RBC AUTO-ENTMCNC: 35.1 G/DL (ref 31–36)
MCV RBC AUTO: 100.2 FL (ref 80–100)
MONOCYTES ABSOLUTE: 0.8 K/UL (ref 0–1.3)
MONOCYTES RELATIVE PERCENT: 9.4 %
NEUTROPHILS ABSOLUTE: 4.6 K/UL (ref 1.7–7.7)
NEUTROPHILS RELATIVE PERCENT: 57.1 %
PDW BLD-RTO: 13.8 % (ref 12.4–15.4)
PLATELET # BLD: 220 K/UL (ref 135–450)
PMV BLD AUTO: 6.9 FL (ref 5–10.5)
POC ACT LR: 209 SEC
POTASSIUM REFLEX MAGNESIUM: 4 MMOL/L (ref 3.5–5.1)
RBC # BLD: 3.99 M/UL (ref 4.2–5.9)
SODIUM BLD-SCNC: 138 MMOL/L (ref 136–145)
TOTAL PROTEIN: 7.5 G/DL (ref 6.4–8.2)
TROPONIN: <0.01 NG/ML
TROPONIN: <0.01 NG/ML
WBC # BLD: 8.1 K/UL (ref 4–11)

## 2021-08-16 PROCEDURE — 99283 EMERGENCY DEPT VISIT LOW MDM: CPT

## 2021-08-16 PROCEDURE — 93010 ELECTROCARDIOGRAM REPORT: CPT | Performed by: INTERNAL MEDICINE

## 2021-08-16 PROCEDURE — 84484 ASSAY OF TROPONIN QUANT: CPT

## 2021-08-16 PROCEDURE — 80053 COMPREHEN METABOLIC PANEL: CPT

## 2021-08-16 PROCEDURE — 71045 X-RAY EXAM CHEST 1 VIEW: CPT

## 2021-08-16 PROCEDURE — 85025 COMPLETE CBC W/AUTO DIFF WBC: CPT

## 2021-08-16 PROCEDURE — 93005 ELECTROCARDIOGRAM TRACING: CPT | Performed by: EMERGENCY MEDICINE

## 2021-08-16 ASSESSMENT — PAIN DESCRIPTION - ONSET: ONSET: PROGRESSIVE

## 2021-08-16 ASSESSMENT — PAIN DESCRIPTION - LOCATION: LOCATION: JAW

## 2021-08-16 ASSESSMENT — LIFESTYLE VARIABLES: SMOKING_STATUS: 1

## 2021-08-16 ASSESSMENT — PAIN SCALES - GENERAL: PAINLEVEL_OUTOF10: 9

## 2021-08-16 ASSESSMENT — PAIN DESCRIPTION - PROGRESSION: CLINICAL_PROGRESSION: GRADUALLY WORSENING

## 2021-08-16 ASSESSMENT — PAIN DESCRIPTION - DESCRIPTORS: DESCRIPTORS: ACHING;DISCOMFORT

## 2021-08-16 ASSESSMENT — ENCOUNTER SYMPTOMS
NAUSEA: 0
EYE PAIN: 0
ABDOMINAL PAIN: 0
SHORTNESS OF BREATH: 1
SHORTNESS OF BREATH: 0
VOMITING: 0

## 2021-08-16 ASSESSMENT — PAIN DESCRIPTION - PAIN TYPE: TYPE: ACUTE PAIN

## 2021-08-16 ASSESSMENT — PAIN DESCRIPTION - FREQUENCY: FREQUENCY: CONTINUOUS

## 2021-08-16 NOTE — ED NOTES
Discharge instructions and follow up care reviewed with patient. Patient voiced understanding. Patient ambulated to private vehicle without difficulty.      Benigno Ydaav RN  08/16/21 [id number]

## 2021-08-16 NOTE — ED PROVIDER NOTES
Neurological: Negative for light-headedness and headaches. Hematological: Does not bruise/bleed easily. PAST MEDICAL HISTORY     Past Medical History:   Diagnosis Date    Blind in both eyes     legally blind - has minimal peripheral vision right eye    CAD (coronary artery disease)     Constipation     Histoplasmosis     Hyperlipidemia     Hypertension     PVD (peripheral vascular disease) ([facility])          SURGICALHISTORY       Past Surgical History:   Procedure Laterality Date    EYE SURGERY Bilateral     general anesthesia x1     FEMORAL-FEMORAL BYPASS GRAFT Bilateral 6/28/2021    FEMORAL TO FEMORAL BYPASS GRAFT WITH RIGHT LOWER EXTREMITY ANGIOGRAM performed by Mehran Salvador MD at [address] [facility]       Current Discharge Medication List      CONTINUE these medications which have NOT CHANGED    Details   mupirocin (BACTROBAN) 2 % ointment Apply to nostrils night before & morning of surgery.   Qty: 1 Tube, Refills: 0    Associated Diagnoses: Coronary artery disease involving native coronary artery of native heart, unspecified whether angina present      chlorhexidine (HIBICLENS) 4 % external liquid Lather and rinse whole body night before & morning of surgery  Qty: 1 Bottle, Refills: 0    Associated Diagnoses: Coronary artery disease involving native coronary artery of native heart, unspecified whether angina present      metoprolol succinate (TOPROL XL) 25 MG extended release tablet Take 1 tablet by mouth nightly  Qty: 90 tablet, Refills: 3      lisinopril (PRINIVIL;ZESTRIL) 10 MG tablet Take 1 tablet by mouth daily  Qty: 90 tablet, Refills: 3      vitamin B-12 (CYANOCOBALAMIN) 1000 MCG tablet Take 1,000 mcg by mouth daily      aspirin 81 MG EC tablet Take 1 tablet by mouth daily  Qty: 30 tablet, Refills: 3      atorvastatin (LIPITOR) 80 MG tablet Take 0.5 tablets by mouth nightly  Qty: 30 tablet, Refills: 3      acetaminophen (APAP EXTRA STRENGTH) 500 MG tablet Take 2 tablets by mouth every 6 hours as needed for Pain  Qty: 120 tablet, Refills: 3             ALLERGIES     Patient has no known allergies. FAMILY HISTORY       Family History   Problem Relation Age of Onset    Alzheimer's Disease Mother     Dementia Father           SOCIAL HISTORY       Social History     Socioeconomic History    Marital status:      Spouse name: None    Number of children: None    Years of education: None    Highest education level: None   Occupational History    None   Tobacco Use    Smoking status: Current Every Day Smoker     Packs/day: 0.25     Types: Cigarettes    Smokeless tobacco: Never Used   Vaping Use    Vaping Use: Never used   Substance and Sexual Activity    Alcohol use: Not Currently    Drug use: No    Sexual activity: Yes     Partners: Female   Other Topics Concern    None   Social History Narrative    None     Social Determinants of Health     Financial Resource Strain:     Difficulty of Paying Living Expenses:    Food Insecurity:     Worried About Running Out of Food in the Last Year:     Ran Out of Food in the Last Year:    Transportation Needs:     Lack of Transportation (Medical):      Lack of Transportation (Non-Medical):    Physical Activity:     Days of Exercise per Week:     Minutes of Exercise per Session:    Stress:     Feeling of Stress :    Social Connections:     Frequency of Communication with Friends and Family:     Frequency of Social Gatherings with Friends and Family:     Attends Hindu Services:     Active Member of Clubs or Organizations:     Attends Club or Organization Meetings:     Marital Status:    Intimate Partner Violence:     Fear of Current or Ex-Partner:     Emotionally Abused:     Physically Abused:     Sexually Abused:        SCREENINGS    [place] Coma Scale  Eye Opening: Spontaneous  Best Verbal Response: Oriented  Best Motor Response: Obeys commands  Prema Coma Scale Score: 15 PHYSICAL EXAM    (up to 7 for level 4, 8 or more for level 5)     ED Triage Vitals [08/16/21 0045]   BP Temp Temp Source Pulse Resp SpO2 Height Weight   (!) 147/108 98.2 °F (36.8 °C) Oral 110 21 96 % 5' 8\" (1.727 m) 206 lb (93.4 kg)       Physical Exam  Vitals and nursing note reviewed. Constitutional:       Appearance: Normal appearance. He is well-developed. He is not ill-appearing. HENT:      Head: Normocephalic and atraumatic. Right Ear: External ear normal.      Left Ear: External ear normal.      Nose: Nose normal.      Mouth/Throat:      Comments: Left jaw tenderness, angle of mandible  Eyes:      General: No scleral icterus. Right eye: No discharge. Left eye: No discharge. Conjunctiva/sclera: Conjunctivae normal.   Cardiovascular:      Rate and Rhythm: Normal rate and regular rhythm. Heart sounds: Normal heart sounds. Pulmonary:      Effort: Pulmonary effort is normal. No respiratory distress. Breath sounds: Normal breath sounds. No wheezing or rales. Abdominal:      General: Bowel sounds are normal. There is no distension. Palpations: Abdomen is soft. Tenderness: There is no abdominal tenderness. There is no guarding or rebound. Musculoskeletal:      Cervical back: Neck supple. Skin:     Coloration: Skin is not pale. Neurological:      Mental Status: He is alert. Psychiatric:         Mood and Affect: Mood normal.         Behavior: Behavior normal.             DIAGNOSTIC RESULTS     EKG: All EKG's are interpreted by the Emergency Department Physician who either signs or Co-signs this chart in the absence of a cardiologist.    12 lead EKG shows sinus tachycardia rate of 108 bpm, OH interval QRS QTC normal.  Normal axis no acute ischemic findings no significant changes when compared to prior EKG August 12, 2021 besides the rate.     Repeat EKG shows normal sinus rhythm at a rate of 89 bpm, OH interval QRS QTC normal.  Normal axis no acute ischemic findings no significant changes from prior EKG earlier in the evening. RADIOLOGY:   Non-plain film images such as CT, Ultrasound and MRI are read by the radiologist. Plain radiographic images are visualized and preliminarily interpreted by the emergency physician with the below findings:        Interpretation per the Radiologist below, if available at the time of this note:    XR CHEST PORTABLE   Final Result   No acute process by radiograph. ED BEDSIDE ULTRASOUND:   Performed by ED Physician - none    LABS:  Labs Reviewed   CBC WITH AUTO DIFFERENTIAL - Abnormal; Notable for the following components:       Result Value    RBC 3.99 (*)     Hematocrit 40.0 (*)     .2 (*)     MCH 35.2 (*)     All other components within normal limits    Narrative:     Performed at:  [address], [facility]   Phone [phone]   COMPREHENSIVE METABOLIC PANEL W/ REFLEX TO MG FOR LOW K - Abnormal; Notable for the following components:    Glucose 125 (*)     All other components within normal limits    Narrative:     Performed at:  [address], [facility]   Phone [phone]   TROPONIN    Narrative:     Performed at:  [address], [facility]   Phone [phone]   TROPONIN    Narrative:     Performed at:  [address], [facility]   Phone [phone]       All other labs were within normal range or not returned as of this dictation.     EMERGENCY DEPARTMENT COURSE and DIFFERENTIAL DIAGNOSIS/MDM:   Vitals:    Vitals:    08/16/21 0215 08/16/21 0245 08/16/21 0315 08/16/21 0345   BP: 117/74 109/75 (!) 106/59 (!) 104/55   Pulse: 96 91 85 90   Resp:       Temp:       TempSrc:       SpO2: 97% 98% 98% 100%   Weight:       Height:                   ED Course as of Aug 16 [facility] Aug 16, 2021   [age] Elderly male who comes in with jaw pain. The pain has been going on for about 2 days and occurs when he opens his mouth to chew or with just when he opens and closes his jaw. He told his wife about it and she was concerned because he is scheduled to have CABG on Tuesday and she was worried that he was having a heart attack. The patient denies any chest pain although he does states that over the past few days he has been feeling like discomfort in his left upper chest almost as if there was gas in his chest.  Presently the patient is not experiencing any of the symptoms. Basic laboratory studies ordered as well as a chest x-ray and EKG. He is placed on cardiac blood pressure and pulse oximetry monitoring. Initial work-up is unremarkable therefore the patient is kept on observation for repeat laboratory studies and troponin. Cardiac monitor is read and interpreted myself shows normal sinus rhythm. [TD]      ED Course User Index  [TD] Noreen Stanley MD     Cardiac monitor is read and interpreted by myself shows normal sinus rhythm. Repeat EKG and troponin negative for any acute process. I discussed my findings with the patient and his wife. The patient is reassured and he is encouraged to keep his scheduled appointment on Tuesday. Given the focal tenderness of his jaw I believe his symptoms are less likely cardiac in nature. CRITICAL CARE TIME   None       CONSULTS:  None    PROCEDURES:       Procedures    FINAL IMPRESSION      1. Jaw pain          DISPOSITION/PLAN   DISPOSITION Decision To Discharge 08/16/2021 04:20:06 AM      PATIENT REFERREDTO:    Keep your scheduled appointment for your heart surgery on Tuesday.         JANET Cates - CNP  [facility]  [phone]            DISCHARGEMEDICATIONS:  Current Discharge Medication List             (Please note that portions of this note were completed with a voice recognition program.  Efforts were made to edit the dictations but occasionally words are mis-transcribed.)    Joel Brock MD (electronically signed)  Attending Emergency Physician        Joel Brock MD  08/16/21 4156

## 2021-08-16 NOTE — ANESTHESIA PRE PROCEDURE
Department of Anesthesiology  Preprocedure Note       Name:  Ricky Lou. Age:  60 y.o.  :  1955                                          MRN:  [id number]         Date:  2021      Surgeon: Kali Reid):  Viv Johnston MD    Procedure: Procedure(s):  CORONARY ARTERY BYPASS GRAFTING X4, INTERNAL MAMMARY ARTERY, SAPHENOUS VEIN GRAFT, ON PUMP WITH LEFT ATRIAL APPENDAGE CLIP  PLACEMENT    Medications prior to admission:   Prior to Admission medications    Medication Sig Start Date End Date Taking? Authorizing Provider   mupirocin (BACTROBAN) 2 % ointment Apply to nostrils night before & morning of surgery. 21  Marian Dong MD   chlorhexidine (HIBICLENS) 4 % external liquid Lather and rinse whole body night before & morning of surgery 21  Marian Quevedo MD   metoprolol succinate (TOPROL XL) 25 MG extended release tablet Take 1 tablet by mouth nightly 21   Liliane Gupta MD   lisinopril (PRINIVIL;ZESTRIL) 10 MG tablet Take 1 tablet by mouth daily 21   Liliane Gupta MD   vitamin B-12 (CYANOCOBALAMIN) 1000 MCG tablet Take 1,000 mcg by mouth daily    Historical Provider, MD   aspirin 81 MG EC tablet Take 1 tablet by mouth daily  Patient taking differently: Take 81 mg by mouth nightly  21   Teressa Dunham MD   atorvastatin (LIPITOR) 80 MG tablet Take 0.5 tablets by mouth nightly 21   Teressa Dunham MD   acetaminophen (APAP EXTRA STRENGTH) 500 MG tablet Take 2 tablets by mouth every 6 hours as needed for Pain  Patient taking differently: Take 1,000 mg by mouth every 12 hours  21   Teressa Dunham MD       Current medications:    No current facility-administered medications for this encounter. Current Outpatient Medications   Medication Sig Dispense Refill    mupirocin (BACTROBAN) 2 % ointment Apply to nostrils night before & morning of surgery.  1 Tube 0    chlorhexidine (HIBICLENS) 4 % external liquid Lather and rinse whole body night Social History:    Social History     Tobacco Use    Smoking status: Current Every Day Smoker     Packs/day: 0.25     Types: Cigarettes    Smokeless tobacco: Never Used   Substance Use Topics    Alcohol use: Not Currently                                Ready to quit: Not Answered  Counseling given: Yes      Vital Signs (Current):   Vitals:    08/13/21 1027   Weight: 206 lb (93.4 kg)   Height: 5' 8\" (1.727 m)                                              BP Readings from Last 3 Encounters:   08/16/21 102/63   07/13/21 130/68   06/29/21 116/76       NPO Status:                                                                                 BMI:   Wt Readings from Last 3 Encounters:   08/16/21 206 lb (93.4 kg)   08/12/21 206 lb (93.4 kg)   07/16/21 226 lb (102.5 kg)     Body mass index is 31.32 kg/m². CBC:   Lab Results   Component Value Date    WBC 8.1 08/16/2021    RBC 3.99 08/16/2021    HGB 14.0 08/16/2021    HCT 40.0 08/16/2021    .2 08/16/2021    RDW 13.8 08/16/2021     08/16/2021       CMP:   Lab Results   Component Value Date     08/16/2021    K 4.0 08/16/2021     08/16/2021    CO2 23 08/16/2021    BUN 9 08/16/2021    CREATININE 0.8 08/16/2021    GFRAA >60 08/16/2021    AGRATIO 1.2 08/16/2021    LABGLOM >60 08/16/2021    GLUCOSE 125 08/16/2021    PROT 7.5 08/16/2021    CALCIUM 9.6 08/16/2021    BILITOT 0.4 08/16/2021    ALKPHOS 62 08/16/2021    AST 19 08/16/2021    ALT 17 08/16/2021       POC Tests: No results for input(s): POCGLU, POCNA, POCK, POCCL, POCBUN, POCHEMO, POCHCT in the last 72 hours. Coags:   Lab Results   Component Value Date    PROTIME 12.8 06/22/2021    INR 1.10 06/22/2021       HCG (If Applicable): No results found for: PREGTESTUR, PREGSERUM, HCG, HCGQUANT     ABGs: No results found for: PHART, PO2ART, GLF6RYL, NAB5RZX, BEART, F2UIAHYB     Type & Screen (If Applicable):  No results found for: LABABO, LABRH    Drug/Infectious Status (If Applicable):   No results found for: HIV, HEPCAB    COVID-19 Screening (If Applicable): No results found for: COVID19        Anesthesia Evaluation    Airway: Mallampati: III  TM distance: >3 FB   Neck ROM: limited  Mouth opening: > = 3 FB Dental:    (+) edentulous      Pulmonary:   (+) shortness of breath: chronic,  current smoker                           Cardiovascular:    (+) hypertension:, CAD: obstructive, CAMPBELL:, hyperlipidemia                  Neuro/Psych:   (+) TIA,             GI/Hepatic/Renal:             Endo/Other:                     Abdominal:             Vascular: Other Findings:             Anesthesia Plan      general     ASA 4     (  [address] [facility] [address] EVALUATION FORM       Name:  Lorna Sy Age:  60 y.o. MRN:  [id number]           I discussed intravenous with inhalational endotracheal anesthesia with pulmonary artery catheter, radial ( or other artery if required) catheter, possible transesophageal echocardiography and post-operative ventilation including risks and alternatives with the patient . The patient has no further questions. Ladonna Chicas MD  August 16, 2021  11:30 AM)  Induction: inhalational and intravenous. arterial line, BIS, central line, CVP and CHARU    Anesthetic plan and risks discussed with patient.                       Ladonna Chicas MD   8/16/2021

## 2021-08-17 ENCOUNTER — HOSPITAL ENCOUNTER (INPATIENT)
Age: 66
LOS: 4 days | Discharge: HOME | DRG: 236 | End: 2021-08-21
Attending: THORACIC SURGERY (CARDIOTHORACIC VASCULAR SURGERY) | Admitting: THORACIC SURGERY (CARDIOTHORACIC VASCULAR SURGERY)
Payer: MEDICARE

## 2021-08-17 ENCOUNTER — ANESTHESIA (OUTPATIENT)
Dept: OPERATING ROOM | Age: 66
DRG: 236 | End: 2021-08-17
Payer: MEDICARE

## 2021-08-17 ENCOUNTER — APPOINTMENT (OUTPATIENT)
Dept: GENERAL RADIOLOGY | Age: 66
DRG: 236 | End: 2021-08-17
Attending: THORACIC SURGERY (CARDIOTHORACIC VASCULAR SURGERY)
Payer: MEDICARE

## 2021-08-17 VITALS
SYSTOLIC BLOOD PRESSURE: 96 MMHG | DIASTOLIC BLOOD PRESSURE: 58 MMHG | RESPIRATION RATE: 4 BRPM | TEMPERATURE: 97.9 F | OXYGEN SATURATION: 100 %

## 2021-08-17 DIAGNOSIS — G89.18 ACUTE POST-OPERATIVE PAIN: ICD-10-CM

## 2021-08-17 DIAGNOSIS — Z01.818 PREOP TESTING: Primary | ICD-10-CM

## 2021-08-17 PROBLEM — I25.10 3-VESSEL CAD: Status: ACTIVE | Noted: 2021-08-17

## 2021-08-17 LAB
ABO/RH: NORMAL
ACTIVATED CLOTTING TIME: 105 SEC (ref 99–130)
ACTIVATED CLOTTING TIME: 116 SEC (ref 99–130)
ACTIVATED CLOTTING TIME: 443 SEC (ref 99–130)
ACTIVATED CLOTTING TIME: 456 SEC (ref 99–130)
ACTIVATED CLOTTING TIME: 481 SEC (ref 99–130)
ACTIVATED CLOTTING TIME: 499 SEC (ref 99–130)
ANION GAP SERPL CALCULATED.3IONS-SCNC: 11 MMOL/L (ref 3–16)
ANTIBODY SCREEN: NORMAL
BASE EXCESS ARTERIAL: -2 (ref -3–3)
BASE EXCESS ARTERIAL: -2 (ref -3–3)
BASE EXCESS ARTERIAL: -3 (ref -3–3)
BASE EXCESS ARTERIAL: -4 (ref -3–3)
BASE EXCESS ARTERIAL: -4 (ref -3–3)
BUN BLDV-MCNC: 5 MG/DL (ref 7–20)
CALCIUM IONIZED: 1.13 MMOL/L (ref 1.12–1.32)
CALCIUM IONIZED: 1.14 MMOL/L (ref 1.12–1.32)
CALCIUM IONIZED: 1.17 MMOL/L (ref 1.12–1.32)
CALCIUM IONIZED: 1.24 MMOL/L (ref 1.12–1.32)
CALCIUM IONIZED: 1.67 MMOL/L (ref 1.12–1.32)
CALCIUM SERPL-MCNC: 9.2 MG/DL (ref 8.3–10.6)
CHLORIDE BLD-SCNC: 107 MMOL/L (ref 99–110)
CO2: 20 MMOL/L (ref 21–32)
CREAT SERPL-MCNC: 0.7 MG/DL (ref 0.8–1.3)
GFR AFRICAN AMERICAN: >60
GFR NON-AFRICAN AMERICAN: >60
GLUCOSE BLD-MCNC: 114 MG/DL (ref 70–99)
GLUCOSE BLD-MCNC: 121 MG/DL (ref 70–99)
GLUCOSE BLD-MCNC: 130 MG/DL (ref 70–99)
GLUCOSE BLD-MCNC: 133 MG/DL (ref 70–99)
GLUCOSE BLD-MCNC: 137 MG/DL (ref 70–99)
GLUCOSE BLD-MCNC: 138 MG/DL (ref 70–99)
GLUCOSE BLD-MCNC: 140 MG/DL (ref 70–99)
GLUCOSE BLD-MCNC: 142 MG/DL (ref 70–99)
GLUCOSE BLD-MCNC: 143 MG/DL (ref 70–99)
GLUCOSE BLD-MCNC: 153 MG/DL (ref 70–99)
GLUCOSE BLD-MCNC: 161 MG/DL (ref 70–99)
GLUCOSE BLD-MCNC: 169 MG/DL (ref 70–99)
GLUCOSE BLD-MCNC: 177 MG/DL (ref 70–99)
GLUCOSE BLD-MCNC: 187 MG/DL (ref 70–99)
HCO3 ARTERIAL: 21.7 MMOL/L (ref 21–29)
HCO3 ARTERIAL: 21.9 MMOL/L (ref 21–29)
HCO3 ARTERIAL: 22 MMOL/L (ref 21–29)
HCO3 ARTERIAL: 22.4 MMOL/L (ref 21–29)
HCO3 ARTERIAL: 23.2 MMOL/L (ref 21–29)
HCT VFR BLD CALC: 30.8 % (ref 40.5–52.5)
HEMOGLOBIN: 10.9 G/DL (ref 13.5–17.5)
HEMOGLOBIN: 11.4 GM/DL (ref 13.5–17.5)
HEMOGLOBIN: 12.4 GM/DL (ref 13.5–17.5)
HEMOGLOBIN: 7.7 GM/DL (ref 13.5–17.5)
HEMOGLOBIN: 7.8 GM/DL (ref 13.5–17.5)
HEMOGLOBIN: 8.6 GM/DL (ref 13.5–17.5)
HEMOGLOBIN: 8.7 GM/DL (ref 13.5–17.5)
LACTATE: 1.11 MMOL/L (ref 0.4–2)
MAGNESIUM: 2.8 MG/DL (ref 1.8–2.4)
MCH RBC QN AUTO: 36 PG (ref 26–34)
MCHC RBC AUTO-ENTMCNC: 35.4 G/DL (ref 31–36)
MCV RBC AUTO: 101.5 FL (ref 80–100)
O2 SAT, ARTERIAL: 100 % (ref 93–100)
PCO2 ARTERIAL: 32.1 MM HG (ref 35–45)
PCO2 ARTERIAL: 37.8 MM HG (ref 35–45)
PCO2 ARTERIAL: 39.5 MM HG (ref 35–45)
PCO2 ARTERIAL: 39.9 MM HG (ref 35–45)
PCO2 ARTERIAL: 40.6 MM HG (ref 35–45)
PDW BLD-RTO: 14.1 % (ref 12.4–15.4)
PERFORMED ON: ABNORMAL
PH ARTERIAL: 7.34 (ref 7.35–7.45)
PH ARTERIAL: 7.36 (ref 7.35–7.45)
PH ARTERIAL: 7.37 (ref 7.35–7.45)
PH ARTERIAL: 7.37 (ref 7.35–7.45)
PH ARTERIAL: 7.44 (ref 7.35–7.45)
PLATELET # BLD: 137 K/UL (ref 135–450)
PMV BLD AUTO: 6.6 FL (ref 5–10.5)
PO2 ARTERIAL: 248.8 MM HG (ref 75–108)
PO2 ARTERIAL: 262.1 MM HG (ref 75–108)
PO2 ARTERIAL: 407.1 MM HG (ref 75–108)
PO2 ARTERIAL: 412.2 MM HG (ref 75–108)
PO2 ARTERIAL: 614.8 MM HG (ref 75–108)
POC HEMATOCRIT: 23 % (ref 40.5–52.5)
POC HEMATOCRIT: 23 % (ref 40.5–52.5)
POC HEMATOCRIT: 25 % (ref 40.5–52.5)
POC HEMATOCRIT: 26 % (ref 40.5–52.5)
POC HEMATOCRIT: 34 % (ref 40.5–52.5)
POC HEMATOCRIT: 36 % (ref 40.5–52.5)
POC POTASSIUM: 3.9 MMOL/L (ref 3.5–5.1)
POC POTASSIUM: 4.2 MMOL/L (ref 3.5–5.1)
POC POTASSIUM: 4.3 MMOL/L (ref 3.5–5.1)
POC POTASSIUM: 4.3 MMOL/L (ref 3.5–5.1)
POC POTASSIUM: 4.4 MMOL/L (ref 3.5–5.1)
POC SAMPLE TYPE: ABNORMAL
POC SODIUM: 133 MMOL/L (ref 136–145)
POC SODIUM: 134 MMOL/L (ref 136–145)
POC SODIUM: 136 MMOL/L (ref 136–145)
POC SODIUM: 138 MMOL/L (ref 136–145)
POC SODIUM: 140 MMOL/L (ref 136–145)
POTASSIUM SERPL-SCNC: 3.9 MMOL/L (ref 3.5–5.1)
POTASSIUM SERPL-SCNC: 4 MMOL/L (ref 3.5–5.1)
RBC # BLD: 3.03 M/UL (ref 4.2–5.9)
SODIUM BLD-SCNC: 138 MMOL/L (ref 136–145)
TCO2 ARTERIAL: 23 MMOL/L
TCO2 ARTERIAL: 24 MMOL/L
TCO2 ARTERIAL: 25 MMOL/L
WBC # BLD: 11 K/UL (ref 4–11)

## 2021-08-17 PROCEDURE — 33518 CABG ARTERY-VEIN TWO: CPT | Performed by: THORACIC SURGERY (CARDIOTHORACIC VASCULAR SURGERY)

## 2021-08-17 PROCEDURE — 2580000003 HC RX 258: Performed by: ANESTHESIOLOGY

## 2021-08-17 PROCEDURE — 2709999900 HC NON-CHARGEABLE SUPPLY: Performed by: THORACIC SURGERY (CARDIOTHORACIC VASCULAR SURGERY)

## 2021-08-17 PROCEDURE — 94669 MECHANICAL CHEST WALL OSCILL: CPT

## 2021-08-17 PROCEDURE — 2700000000 HC OXYGEN THERAPY PER DAY

## 2021-08-17 PROCEDURE — 85027 COMPLETE CBC AUTOMATED: CPT

## 2021-08-17 PROCEDURE — 85014 HEMATOCRIT: CPT

## 2021-08-17 PROCEDURE — 83735 ASSAY OF MAGNESIUM: CPT

## 2021-08-17 PROCEDURE — 02L70CK OCCLUSION OF LEFT ATRIAL APPENDAGE WITH EXTRALUMINAL DEVICE, OPEN APPROACH: ICD-10-PCS | Performed by: THORACIC SURGERY (CARDIOTHORACIC VASCULAR SURGERY)

## 2021-08-17 PROCEDURE — 5A1221Z PERFORMANCE OF CARDIAC OUTPUT, CONTINUOUS: ICD-10-PCS | Performed by: THORACIC SURGERY (CARDIOTHORACIC VASCULAR SURGERY)

## 2021-08-17 PROCEDURE — 94640 AIRWAY INHALATION TREATMENT: CPT

## 2021-08-17 PROCEDURE — 2500000003 HC RX 250 WO HCPCS: Performed by: ANESTHESIOLOGY

## 2021-08-17 PROCEDURE — 36415 COLL VENOUS BLD VENIPUNCTURE: CPT

## 2021-08-17 PROCEDURE — 6360000002 HC RX W HCPCS: Performed by: THORACIC SURGERY (CARDIOTHORACIC VASCULAR SURGERY)

## 2021-08-17 PROCEDURE — 84295 ASSAY OF SERUM SODIUM: CPT

## 2021-08-17 PROCEDURE — 3600000018 HC SURGERY OHS ADDTL 15MIN: Performed by: THORACIC SURGERY (CARDIOTHORACIC VASCULAR SURGERY)

## 2021-08-17 PROCEDURE — 82947 ASSAY GLUCOSE BLOOD QUANT: CPT

## 2021-08-17 PROCEDURE — B24BZZ4 ULTRASONOGRAPHY OF HEART WITH AORTA, TRANSESOPHAGEAL: ICD-10-PCS | Performed by: THORACIC SURGERY (CARDIOTHORACIC VASCULAR SURGERY)

## 2021-08-17 PROCEDURE — 3E080GC INTRODUCTION OF OTHER THERAPEUTIC SUBSTANCE INTO HEART, OPEN APPROACH: ICD-10-PCS | Performed by: THORACIC SURGERY (CARDIOTHORACIC VASCULAR SURGERY)

## 2021-08-17 PROCEDURE — P9045 ALBUMIN (HUMAN), 5%, 250 ML: HCPCS | Performed by: THORACIC SURGERY (CARDIOTHORACIC VASCULAR SURGERY)

## 2021-08-17 PROCEDURE — 82330 ASSAY OF CALCIUM: CPT

## 2021-08-17 PROCEDURE — 94761 N-INVAS EAR/PLS OXIMETRY MLT: CPT

## 2021-08-17 PROCEDURE — 6370000000 HC RX 637 (ALT 250 FOR IP): Performed by: THORACIC SURGERY (CARDIOTHORACIC VASCULAR SURGERY)

## 2021-08-17 PROCEDURE — 06BP4ZZ EXCISION OF RIGHT SAPHENOUS VEIN, PERCUTANEOUS ENDOSCOPIC APPROACH: ICD-10-PCS | Performed by: THORACIC SURGERY (CARDIOTHORACIC VASCULAR SURGERY)

## 2021-08-17 PROCEDURE — 82803 BLOOD GASES ANY COMBINATION: CPT

## 2021-08-17 PROCEDURE — 80048 BASIC METABOLIC PNL TOTAL CA: CPT

## 2021-08-17 PROCEDURE — 2100000000 HC CCU R&B

## 2021-08-17 PROCEDURE — 37799 UNLISTED PX VASCULAR SURGERY: CPT

## 2021-08-17 PROCEDURE — 84132 ASSAY OF SERUM POTASSIUM: CPT

## 2021-08-17 PROCEDURE — 2720000010 HC SURG SUPPLY STERILE: Performed by: THORACIC SURGERY (CARDIOTHORACIC VASCULAR SURGERY)

## 2021-08-17 PROCEDURE — C1889 IMPLANT/INSERT DEVICE, NOC: HCPCS | Performed by: THORACIC SURGERY (CARDIOTHORACIC VASCULAR SURGERY)

## 2021-08-17 PROCEDURE — 6370000000 HC RX 637 (ALT 250 FOR IP): Performed by: ANESTHESIOLOGY

## 2021-08-17 PROCEDURE — 33533 CABG ARTERIAL SINGLE: CPT | Performed by: THORACIC SURGERY (CARDIOTHORACIC VASCULAR SURGERY)

## 2021-08-17 PROCEDURE — 86923 COMPATIBILITY TEST ELECTRIC: CPT

## 2021-08-17 PROCEDURE — 71045 X-RAY EXAM CHEST 1 VIEW: CPT

## 2021-08-17 PROCEDURE — 85347 COAGULATION TIME ACTIVATED: CPT

## 2021-08-17 PROCEDURE — 3700000000 HC ANESTHESIA ATTENDED CARE: Performed by: THORACIC SURGERY (CARDIOTHORACIC VASCULAR SURGERY)

## 2021-08-17 PROCEDURE — 6360000002 HC RX W HCPCS: Performed by: ANESTHESIOLOGY

## 2021-08-17 PROCEDURE — C1729 CATH, DRAINAGE: HCPCS | Performed by: THORACIC SURGERY (CARDIOTHORACIC VASCULAR SURGERY)

## 2021-08-17 PROCEDURE — 86850 RBC ANTIBODY SCREEN: CPT

## 2021-08-17 PROCEDURE — 3700000001 HC ADD 15 MINUTES (ANESTHESIA): Performed by: THORACIC SURGERY (CARDIOTHORACIC VASCULAR SURGERY)

## 2021-08-17 PROCEDURE — 021109W BYPASS CORONARY ARTERY, TWO ARTERIES FROM AORTA WITH AUTOLOGOUS VENOUS TISSUE, OPEN APPROACH: ICD-10-PCS | Performed by: THORACIC SURGERY (CARDIOTHORACIC VASCULAR SURGERY)

## 2021-08-17 PROCEDURE — C1786 PMKR, SINGLE, RATE-RESP: HCPCS | Performed by: THORACIC SURGERY (CARDIOTHORACIC VASCULAR SURGERY)

## 2021-08-17 PROCEDURE — 3600000008 HC SURGERY OHS BASE: Performed by: THORACIC SURGERY (CARDIOTHORACIC VASCULAR SURGERY)

## 2021-08-17 PROCEDURE — C9290 INJ, BUPIVACAINE LIPOSOME: HCPCS | Performed by: THORACIC SURGERY (CARDIOTHORACIC VASCULAR SURGERY)

## 2021-08-17 PROCEDURE — 02100Z9 BYPASS CORONARY ARTERY, ONE ARTERY FROM LEFT INTERNAL MAMMARY, OPEN APPROACH: ICD-10-PCS | Performed by: THORACIC SURGERY (CARDIOTHORACIC VASCULAR SURGERY)

## 2021-08-17 PROCEDURE — 83605 ASSAY OF LACTIC ACID: CPT

## 2021-08-17 PROCEDURE — 86901 BLOOD TYPING SEROLOGIC RH(D): CPT

## 2021-08-17 PROCEDURE — 33508 ENDOSCOPIC VEIN HARVEST: CPT | Performed by: THORACIC SURGERY (CARDIOTHORACIC VASCULAR SURGERY)

## 2021-08-17 PROCEDURE — 86900 BLOOD TYPING SEROLOGIC ABO: CPT

## 2021-08-17 PROCEDURE — 2500000003 HC RX 250 WO HCPCS: Performed by: THORACIC SURGERY (CARDIOTHORACIC VASCULAR SURGERY)

## 2021-08-17 PROCEDURE — 2580000003 HC RX 258: Performed by: THORACIC SURGERY (CARDIOTHORACIC VASCULAR SURGERY)

## 2021-08-17 DEVICE — Z INACTIVE USE 2124449 DEVICE OCCL CLP L35MM SM FOOTPRINT 1 HND APPL SUTURELESS W/: Type: IMPLANTABLE DEVICE | Site: HEART | Status: FUNCTIONAL

## 2021-08-17 RX ORDER — POLYETHYLENE GLYCOL 3350 17 G/17G
17 POWDER, FOR SOLUTION ORAL DAILY
Status: DISCONTINUED | OUTPATIENT
Start: 2021-08-18 | End: 2021-08-21 | Stop reason: HOSPADM

## 2021-08-17 RX ORDER — KETAMINE HYDROCHLORIDE 100 MG/ML
INJECTION, SOLUTION INTRAMUSCULAR; INTRAVENOUS PRN
Status: DISCONTINUED | OUTPATIENT
Start: 2021-08-17 | End: 2021-08-17 | Stop reason: SDUPTHER

## 2021-08-17 RX ORDER — METOPROLOL TARTRATE 5 MG/5ML
INJECTION INTRAVENOUS PRN
Status: DISCONTINUED | OUTPATIENT
Start: 2021-08-17 | End: 2021-08-17 | Stop reason: SDUPTHER

## 2021-08-17 RX ORDER — DOBUTAMINE HYDROCHLORIDE 200 MG/100ML
2 INJECTION INTRAVENOUS CONTINUOUS PRN
Status: DISCONTINUED | OUTPATIENT
Start: 2021-08-17 | End: 2021-08-19

## 2021-08-17 RX ORDER — DEXTROSE MONOHYDRATE 50 MG/ML
100 INJECTION, SOLUTION INTRAVENOUS PRN
Status: DISCONTINUED | OUTPATIENT
Start: 2021-08-17 | End: 2021-08-21 | Stop reason: HOSPADM

## 2021-08-17 RX ORDER — SODIUM CHLORIDE 9 MG/ML
INJECTION, SOLUTION INTRAVENOUS CONTINUOUS PRN
Status: DISCONTINUED | OUTPATIENT
Start: 2021-08-17 | End: 2021-08-17 | Stop reason: SDUPTHER

## 2021-08-17 RX ORDER — METOPROLOL SUCCINATE 25 MG/1
25 TABLET, EXTENDED RELEASE ORAL ONCE
Status: COMPLETED | OUTPATIENT
Start: 2021-08-17 | End: 2021-08-17

## 2021-08-17 RX ORDER — INSULIN GLARGINE 100 [IU]/ML
0.15 INJECTION, SOLUTION SUBCUTANEOUS NIGHTLY
Status: DISCONTINUED | OUTPATIENT
Start: 2021-08-18 | End: 2021-08-20

## 2021-08-17 RX ORDER — CISATRACURIUM BESYLATE 2 MG/ML
INJECTION, SOLUTION INTRAVENOUS PRN
Status: DISCONTINUED | OUTPATIENT
Start: 2021-08-17 | End: 2021-08-17 | Stop reason: SDUPTHER

## 2021-08-17 RX ORDER — HYDRALAZINE HYDROCHLORIDE 20 MG/ML
INJECTION INTRAMUSCULAR; INTRAVENOUS PRN
Status: DISCONTINUED | OUTPATIENT
Start: 2021-08-17 | End: 2021-08-17 | Stop reason: SDUPTHER

## 2021-08-17 RX ORDER — PROTAMINE SULFATE 10 MG/ML
50 INJECTION, SOLUTION INTRAVENOUS
Status: DISPENSED | OUTPATIENT
Start: 2021-08-17 | End: 2021-08-17

## 2021-08-17 RX ORDER — MORPHINE SULFATE 2 MG/ML
2 INJECTION, SOLUTION INTRAMUSCULAR; INTRAVENOUS
Status: DISCONTINUED | OUTPATIENT
Start: 2021-08-17 | End: 2021-08-21 | Stop reason: HOSPADM

## 2021-08-17 RX ORDER — HYDRALAZINE HYDROCHLORIDE 20 MG/ML
5 INJECTION INTRAMUSCULAR; INTRAVENOUS EVERY 5 MIN PRN
Status: DISCONTINUED | OUTPATIENT
Start: 2021-08-17 | End: 2021-08-21 | Stop reason: HOSPADM

## 2021-08-17 RX ORDER — DEXTROSE MONOHYDRATE 25 G/50ML
12.5 INJECTION, SOLUTION INTRAVENOUS PRN
Status: DISCONTINUED | OUTPATIENT
Start: 2021-08-17 | End: 2021-08-21 | Stop reason: HOSPADM

## 2021-08-17 RX ORDER — DOBUTAMINE HYDROCHLORIDE 200 MG/100ML
INJECTION INTRAVENOUS CONTINUOUS PRN
Status: DISCONTINUED | OUTPATIENT
Start: 2021-08-17 | End: 2021-08-17 | Stop reason: SDUPTHER

## 2021-08-17 RX ORDER — MILRINONE LACTATE 0.2 MG/ML
INJECTION, SOLUTION INTRAVENOUS CONTINUOUS PRN
Status: DISCONTINUED | OUTPATIENT
Start: 2021-08-17 | End: 2021-08-17 | Stop reason: SDUPTHER

## 2021-08-17 RX ORDER — CLOPIDOGREL BISULFATE 75 MG/1
75 TABLET ORAL DAILY
Status: DISCONTINUED | OUTPATIENT
Start: 2021-08-18 | End: 2021-08-21 | Stop reason: HOSPADM

## 2021-08-17 RX ORDER — POTASSIUM CHLORIDE 29.8 MG/ML
20 INJECTION INTRAVENOUS PRN
Status: DISCONTINUED | OUTPATIENT
Start: 2021-08-17 | End: 2021-08-21 | Stop reason: HOSPADM

## 2021-08-17 RX ORDER — SUFENTANIL CITRATE 50 UG/ML
INJECTION EPIDURAL; INTRAVENOUS PRN
Status: DISCONTINUED | OUTPATIENT
Start: 2021-08-17 | End: 2021-08-17 | Stop reason: SDUPTHER

## 2021-08-17 RX ORDER — ASPIRIN 300 MG/1
300 SUPPOSITORY RECTAL DAILY
Status: DISCONTINUED | OUTPATIENT
Start: 2021-08-17 | End: 2021-08-17 | Stop reason: CLARIF

## 2021-08-17 RX ORDER — SODIUM CHLORIDE 9 MG/ML
25 INJECTION, SOLUTION INTRAVENOUS PRN
Status: DISCONTINUED | OUTPATIENT
Start: 2021-08-17 | End: 2021-08-21 | Stop reason: HOSPADM

## 2021-08-17 RX ORDER — OXYCODONE HYDROCHLORIDE 5 MG/1
5 TABLET ORAL EVERY 4 HOURS PRN
Status: DISCONTINUED | OUTPATIENT
Start: 2021-08-17 | End: 2021-08-21 | Stop reason: HOSPADM

## 2021-08-17 RX ORDER — POTASSIUM CHLORIDE 750 MG/1
10 TABLET, EXTENDED RELEASE ORAL
Status: DISCONTINUED | OUTPATIENT
Start: 2021-08-18 | End: 2021-08-20

## 2021-08-17 RX ORDER — NICOTINE POLACRILEX 4 MG
15 LOZENGE BUCCAL PRN
Status: DISCONTINUED | OUTPATIENT
Start: 2021-08-17 | End: 2021-08-21 | Stop reason: HOSPADM

## 2021-08-17 RX ORDER — ACETAMINOPHEN 325 MG/1
650 TABLET ORAL EVERY 6 HOURS SCHEDULED
Status: DISPENSED | OUTPATIENT
Start: 2021-08-18 | End: 2021-08-20

## 2021-08-17 RX ORDER — METOPROLOL SUCCINATE 25 MG/1
TABLET, EXTENDED RELEASE ORAL
Status: DISCONTINUED
Start: 2021-08-17 | End: 2021-08-17

## 2021-08-17 RX ORDER — ALBUMIN, HUMAN INJ 5% 5 %
25 SOLUTION INTRAVENOUS PRN
Status: DISCONTINUED | OUTPATIENT
Start: 2021-08-17 | End: 2021-08-21 | Stop reason: HOSPADM

## 2021-08-17 RX ORDER — MILRINONE LACTATE 0.2 MG/ML
0.12 INJECTION, SOLUTION INTRAVENOUS CONTINUOUS
Status: DISCONTINUED | OUTPATIENT
Start: 2021-08-17 | End: 2021-08-19

## 2021-08-17 RX ORDER — DEXTROSE AND SODIUM CHLORIDE 5; .45 G/100ML; G/100ML
INJECTION, SOLUTION INTRAVENOUS CONTINUOUS
Status: DISCONTINUED | OUTPATIENT
Start: 2021-08-17 | End: 2021-08-19

## 2021-08-17 RX ORDER — ONDANSETRON 2 MG/ML
4 INJECTION INTRAMUSCULAR; INTRAVENOUS EVERY 8 HOURS PRN
Status: DISCONTINUED | OUTPATIENT
Start: 2021-08-17 | End: 2021-08-21 | Stop reason: HOSPADM

## 2021-08-17 RX ORDER — OXYCODONE HYDROCHLORIDE 5 MG/1
10 TABLET ORAL EVERY 4 HOURS PRN
Status: DISCONTINUED | OUTPATIENT
Start: 2021-08-17 | End: 2021-08-21 | Stop reason: HOSPADM

## 2021-08-17 RX ORDER — ALBUTEROL SULFATE 2.5 MG/3ML
2.5 SOLUTION RESPIRATORY (INHALATION)
Status: DISCONTINUED | OUTPATIENT
Start: 2021-08-17 | End: 2021-08-21 | Stop reason: HOSPADM

## 2021-08-17 RX ORDER — MEPERIDINE HYDROCHLORIDE 50 MG/ML
25 INJECTION INTRAMUSCULAR; INTRAVENOUS; SUBCUTANEOUS
Status: ACTIVE | OUTPATIENT
Start: 2021-08-17 | End: 2021-08-17

## 2021-08-17 RX ORDER — HEPARIN SODIUM 1000 [USP'U]/ML
INJECTION, SOLUTION INTRAVENOUS; SUBCUTANEOUS PRN
Status: DISCONTINUED | OUTPATIENT
Start: 2021-08-17 | End: 2021-08-17 | Stop reason: SDUPTHER

## 2021-08-17 RX ORDER — CALCIUM CHLORIDE 100 MG/ML
INJECTION INTRAVENOUS; INTRAVENTRICULAR PRN
Status: DISCONTINUED | OUTPATIENT
Start: 2021-08-17 | End: 2021-08-17 | Stop reason: SDUPTHER

## 2021-08-17 RX ORDER — CHLORHEXIDINE GLUCONATE 0.12 MG/ML
15 RINSE ORAL 2 TIMES DAILY
Status: DISCONTINUED | OUTPATIENT
Start: 2021-08-17 | End: 2021-08-21 | Stop reason: HOSPADM

## 2021-08-17 RX ORDER — MIDAZOLAM HYDROCHLORIDE 1 MG/ML
1 INJECTION INTRAMUSCULAR; INTRAVENOUS
Status: DISCONTINUED | OUTPATIENT
Start: 2021-08-17 | End: 2021-08-21 | Stop reason: HOSPADM

## 2021-08-17 RX ORDER — PROTAMINE SULFATE 10 MG/ML
INJECTION, SOLUTION INTRAVENOUS PRN
Status: DISCONTINUED | OUTPATIENT
Start: 2021-08-17 | End: 2021-08-17 | Stop reason: SDUPTHER

## 2021-08-17 RX ORDER — SODIUM CHLORIDE, SODIUM GLUCONATE, SODIUM ACETATE, POTASSIUM CHLORIDE AND MAGNESIUM CHLORIDE 526; 502; 368; 37; 30 MG/100ML; MG/100ML; MG/100ML; MG/100ML; MG/100ML
INJECTION, SOLUTION INTRAVENOUS CONTINUOUS PRN
Status: DISCONTINUED | OUTPATIENT
Start: 2021-08-17 | End: 2021-08-17 | Stop reason: SDUPTHER

## 2021-08-17 RX ORDER — SODIUM CHLORIDE 0.9 % (FLUSH) 0.9 %
10 SYRINGE (ML) INJECTION PRN
Status: DISCONTINUED | OUTPATIENT
Start: 2021-08-17 | End: 2021-08-21 | Stop reason: HOSPADM

## 2021-08-17 RX ORDER — ASPIRIN 81 MG/1
81 TABLET ORAL DAILY
Status: DISCONTINUED | OUTPATIENT
Start: 2021-08-17 | End: 2021-08-21 | Stop reason: HOSPADM

## 2021-08-17 RX ORDER — SODIUM CHLORIDE, SODIUM LACTATE, POTASSIUM CHLORIDE, CALCIUM CHLORIDE 600; 310; 30; 20 MG/100ML; MG/100ML; MG/100ML; MG/100ML
INJECTION, SOLUTION INTRAVENOUS CONTINUOUS PRN
Status: DISCONTINUED | OUTPATIENT
Start: 2021-08-17 | End: 2021-08-17 | Stop reason: SDUPTHER

## 2021-08-17 RX ORDER — AMINOCAPROIC ACID 250 MG/ML
INJECTION, SOLUTION INTRAVENOUS PRN
Status: DISCONTINUED | OUTPATIENT
Start: 2021-08-17 | End: 2021-08-17 | Stop reason: SDUPTHER

## 2021-08-17 RX ORDER — MORPHINE SULFATE 4 MG/ML
4 INJECTION, SOLUTION INTRAMUSCULAR; INTRAVENOUS
Status: DISCONTINUED | OUTPATIENT
Start: 2021-08-17 | End: 2021-08-21 | Stop reason: HOSPADM

## 2021-08-17 RX ORDER — MIDAZOLAM HYDROCHLORIDE 1 MG/ML
INJECTION INTRAMUSCULAR; INTRAVENOUS PRN
Status: DISCONTINUED | OUTPATIENT
Start: 2021-08-17 | End: 2021-08-17 | Stop reason: SDUPTHER

## 2021-08-17 RX ORDER — MAGNESIUM SULFATE IN WATER 40 MG/ML
2000 INJECTION, SOLUTION INTRAVENOUS PRN
Status: DISCONTINUED | OUTPATIENT
Start: 2021-08-17 | End: 2021-08-21 | Stop reason: HOSPADM

## 2021-08-17 RX ORDER — ACETAMINOPHEN 650 MG/1
650 SUPPOSITORY RECTAL EVERY 4 HOURS PRN
Status: DISCONTINUED | OUTPATIENT
Start: 2021-08-17 | End: 2021-08-21 | Stop reason: HOSPADM

## 2021-08-17 RX ORDER — METOPROLOL TARTRATE 5 MG/5ML
2.5 INJECTION INTRAVENOUS EVERY 10 MIN PRN
Status: DISCONTINUED | OUTPATIENT
Start: 2021-08-17 | End: 2021-08-21 | Stop reason: HOSPADM

## 2021-08-17 RX ORDER — FAMOTIDINE 20 MG/1
20 TABLET, FILM COATED ORAL 2 TIMES DAILY
Status: DISCONTINUED | OUTPATIENT
Start: 2021-08-18 | End: 2021-08-21 | Stop reason: HOSPADM

## 2021-08-17 RX ORDER — FUROSEMIDE 10 MG/ML
20 INJECTION INTRAMUSCULAR; INTRAVENOUS 4 TIMES DAILY
Status: DISCONTINUED | OUTPATIENT
Start: 2021-08-18 | End: 2021-08-19

## 2021-08-17 RX ORDER — ACETAMINOPHEN 650 MG/1
SUPPOSITORY RECTAL PRN
Status: DISCONTINUED | OUTPATIENT
Start: 2021-08-17 | End: 2021-08-19 | Stop reason: ALTCHOICE

## 2021-08-17 RX ORDER — ATORVASTATIN CALCIUM 40 MG/1
40 TABLET, FILM COATED ORAL NIGHTLY
Status: DISCONTINUED | OUTPATIENT
Start: 2021-08-18 | End: 2021-08-21 | Stop reason: HOSPADM

## 2021-08-17 RX ORDER — SODIUM CHLORIDE, SODIUM LACTATE, POTASSIUM CHLORIDE, CALCIUM CHLORIDE 600; 310; 30; 20 MG/100ML; MG/100ML; MG/100ML; MG/100ML
1000 INJECTION, SOLUTION INTRAVENOUS CONTINUOUS
Status: DISCONTINUED | OUTPATIENT
Start: 2021-08-17 | End: 2021-08-17

## 2021-08-17 RX ORDER — SODIUM CHLORIDE 0.9 % (FLUSH) 0.9 %
10 SYRINGE (ML) INJECTION EVERY 12 HOURS SCHEDULED
Status: DISCONTINUED | OUTPATIENT
Start: 2021-08-17 | End: 2021-08-21 | Stop reason: HOSPADM

## 2021-08-17 RX ADMIN — POTASSIUM CHLORIDE 20 MEQ: 29.8 INJECTION, SOLUTION INTRAVENOUS at 23:30

## 2021-08-17 RX ADMIN — DOBUTAMINE HYDROCHLORIDE 2 MCG/KG/MIN: 200 INJECTION INTRAVENOUS at 12:50

## 2021-08-17 RX ADMIN — SODIUM CHLORIDE, SODIUM GLUCONATE, SODIUM ACETATE, POTASSIUM CHLORIDE AND MAGNESIUM CHLORIDE: 526; 502; 368; 37; 30 INJECTION, SOLUTION INTRAVENOUS at 15:46

## 2021-08-17 RX ADMIN — KETAMINE HYDROCHLORIDE 75 MG: 100 INJECTION, SOLUTION INTRAMUSCULAR; INTRAVENOUS at 12:21

## 2021-08-17 RX ADMIN — AMINOCAPROIC ACID 5000 MG: 250 INJECTION, SOLUTION INTRAVENOUS at 13:55

## 2021-08-17 RX ADMIN — HEPARIN SODIUM 37000 UNITS: 1000 INJECTION, SOLUTION INTRAVENOUS; SUBCUTANEOUS at 13:48

## 2021-08-17 RX ADMIN — CISATRACURIUM BESYLATE 10 MG: 2 INJECTION INTRAVENOUS at 13:30

## 2021-08-17 RX ADMIN — METOPROLOL TARTRATE 1 MG: 5 INJECTION, SOLUTION INTRAVENOUS at 15:26

## 2021-08-17 RX ADMIN — SODIUM CHLORIDE, SODIUM LACTATE, POTASSIUM CHLORIDE, AND CALCIUM CHLORIDE: .6; .31; .03; .02 INJECTION, SOLUTION INTRAVENOUS at 12:18

## 2021-08-17 RX ADMIN — Medication 15 ML: at 20:08

## 2021-08-17 RX ADMIN — DEXTROSE MONOHYDRATE 2 MCG/MIN: 50 INJECTION, SOLUTION INTRAVENOUS at 16:31

## 2021-08-17 RX ADMIN — SUFENTANIL CITRATE 10 MCG: 50 INJECTION, SOLUTION EPIDURAL; INTRAVENOUS at 12:21

## 2021-08-17 RX ADMIN — OXYCODONE HYDROCHLORIDE 5 MG: 5 TABLET ORAL at 21:18

## 2021-08-17 RX ADMIN — SODIUM CHLORIDE: 9 INJECTION, SOLUTION INTRAVENOUS at 12:50

## 2021-08-17 RX ADMIN — Medication 10 ML: at 20:08

## 2021-08-17 RX ADMIN — HYDRALAZINE HYDROCHLORIDE 5 MG: 20 INJECTION INTRAMUSCULAR; INTRAVENOUS at 15:23

## 2021-08-17 RX ADMIN — CISATRACURIUM BESYLATE 10 MG: 2 INJECTION INTRAVENOUS at 14:30

## 2021-08-17 RX ADMIN — POTASSIUM CHLORIDE 20 MEQ: 29.8 INJECTION, SOLUTION INTRAVENOUS at 22:26

## 2021-08-17 RX ADMIN — ASPIRIN 81 MG: 81 TABLET, COATED ORAL at 21:19

## 2021-08-17 RX ADMIN — CEFAZOLIN 2000 MG: 10 INJECTION, POWDER, FOR SOLUTION INTRAVENOUS at 20:01

## 2021-08-17 RX ADMIN — FAMOTIDINE 20 MG: 10 INJECTION, SOLUTION INTRAVENOUS at 11:44

## 2021-08-17 RX ADMIN — MUPIROCIN: 20 OINTMENT TOPICAL at 20:08

## 2021-08-17 RX ADMIN — MILRINONE LACTATE 0.25 MCG/KG/MIN: 0.2 INJECTION, SOLUTION INTRAVENOUS at 13:35

## 2021-08-17 RX ADMIN — MIDAZOLAM HYDROCHLORIDE 2 MG: 2 INJECTION, SOLUTION INTRAMUSCULAR; INTRAVENOUS at 12:21

## 2021-08-17 RX ADMIN — PROTAMINE SULFATE 450 MG: 10 INJECTION, SOLUTION INTRAVENOUS at 15:13

## 2021-08-17 RX ADMIN — POTASSIUM CHLORIDE 20 MEQ: 29.8 INJECTION, SOLUTION INTRAVENOUS at 18:52

## 2021-08-17 RX ADMIN — CEFAZOLIN SODIUM 2000 MG: 10 INJECTION, POWDER, FOR SOLUTION INTRAVENOUS at 12:45

## 2021-08-17 RX ADMIN — CISATRACURIUM BESYLATE 20 MG: 2 INJECTION INTRAVENOUS at 12:22

## 2021-08-17 RX ADMIN — ALBUMIN (HUMAN) 12.5 G: 12.5 INJECTION, SOLUTION INTRAVENOUS at 16:44

## 2021-08-17 RX ADMIN — METOPROLOL TARTRATE 2.5 MG: 5 INJECTION, SOLUTION INTRAVENOUS at 13:36

## 2021-08-17 RX ADMIN — DEXTROSE AND SODIUM CHLORIDE: 5; 450 INJECTION, SOLUTION INTRAVENOUS at 17:39

## 2021-08-17 RX ADMIN — CALCIUM CHLORIDE 0.5 G: 100 INJECTION, SOLUTION INTRAVENOUS at 15:15

## 2021-08-17 RX ADMIN — SODIUM CHLORIDE 1.8 UNITS/HR: 9 INJECTION, SOLUTION INTRAVENOUS at 12:45

## 2021-08-17 RX ADMIN — POTASSIUM CHLORIDE 20 MEQ: 29.8 INJECTION, SOLUTION INTRAVENOUS at 17:48

## 2021-08-17 RX ADMIN — SODIUM CHLORIDE 2.4 UNITS/HR: 9 INJECTION, SOLUTION INTRAVENOUS at 13:55

## 2021-08-17 RX ADMIN — SODIUM CHLORIDE 4.36 UNITS/HR: 9 INJECTION, SOLUTION INTRAVENOUS at 17:32

## 2021-08-17 RX ADMIN — SUFENTANIL CITRATE 15 MCG: 50 INJECTION, SOLUTION EPIDURAL; INTRAVENOUS at 13:04

## 2021-08-17 RX ADMIN — SUFENTANIL CITRATE 25 MCG: 50 INJECTION, SOLUTION EPIDURAL; INTRAVENOUS at 13:30

## 2021-08-17 RX ADMIN — Medication 1500 MG: at 12:45

## 2021-08-17 RX ADMIN — SODIUM CHLORIDE, SODIUM LACTATE, POTASSIUM CHLORIDE, AND CALCIUM CHLORIDE: .6; .31; .03; .02 INJECTION, SOLUTION INTRAVENOUS at 15:43

## 2021-08-17 RX ADMIN — SODIUM CHLORIDE, SODIUM LACTATE, POTASSIUM CHLORIDE, AND CALCIUM CHLORIDE 1000 ML: .6; .31; .03; .02 INJECTION, SOLUTION INTRAVENOUS at 11:44

## 2021-08-17 RX ADMIN — MORPHINE SULFATE 2 MG: 2 INJECTION, SOLUTION INTRAMUSCULAR; INTRAVENOUS at 23:05

## 2021-08-17 RX ADMIN — ALBUMIN (HUMAN) 12.5 G: 12.5 INJECTION, SOLUTION INTRAVENOUS at 18:39

## 2021-08-17 RX ADMIN — ALBUTEROL SULFATE 2.5 MG: 2.5 SOLUTION RESPIRATORY (INHALATION) at 20:22

## 2021-08-17 RX ADMIN — METOPROLOL SUCCINATE 25 MG: 25 TABLET, EXTENDED RELEASE ORAL at 11:46

## 2021-08-17 ASSESSMENT — PULMONARY FUNCTION TESTS
PIF_VALUE: 1
PIF_VALUE: 20
PIF_VALUE: 20
PIF_VALUE: 19
PIF_VALUE: 1
PIF_VALUE: 24
PIF_VALUE: 1
PIF_VALUE: 20
PIF_VALUE: 1
PIF_VALUE: 1
PIF_VALUE: 26
PIF_VALUE: 25
PIF_VALUE: 1
PIF_VALUE: 18
PIF_VALUE: 2
PIF_VALUE: 1
PIF_VALUE: 21
PIF_VALUE: 2
PIF_VALUE: 19
PIF_VALUE: 26
PIF_VALUE: 2
PIF_VALUE: 21
PIF_VALUE: 1
PIF_VALUE: 24
PIF_VALUE: 22
PIF_VALUE: 2
PIF_VALUE: 18
PIF_VALUE: 25
PIF_VALUE: 2
PIF_VALUE: 21
PIF_VALUE: 23
PIF_VALUE: 1
PIF_VALUE: 22
PIF_VALUE: 21
PIF_VALUE: 19
PIF_VALUE: 7
PIF_VALUE: 1
PIF_VALUE: 20
PIF_VALUE: 26
PIF_VALUE: 19
PIF_VALUE: 1
PIF_VALUE: 20
PIF_VALUE: 1
PIF_VALUE: 1
PIF_VALUE: 23
PIF_VALUE: 25
PIF_VALUE: 21
PIF_VALUE: 1
PIF_VALUE: 23
PIF_VALUE: 23
PIF_VALUE: 1
PIF_VALUE: 25
PIF_VALUE: 21
PIF_VALUE: 21
PIF_VALUE: 1
PIF_VALUE: 20
PIF_VALUE: 1
PIF_VALUE: 2
PIF_VALUE: 22
PIF_VALUE: 22
PIF_VALUE: 2
PIF_VALUE: 25
PIF_VALUE: 19
PIF_VALUE: 1
PIF_VALUE: 20
PIF_VALUE: 2
PIF_VALUE: 20
PIF_VALUE: 19
PIF_VALUE: 1
PIF_VALUE: 19
PIF_VALUE: 18
PIF_VALUE: 19
PIF_VALUE: 23
PIF_VALUE: 19
PIF_VALUE: 21
PIF_VALUE: 22
PIF_VALUE: 20
PIF_VALUE: 2
PIF_VALUE: 23
PIF_VALUE: 1
PIF_VALUE: 19
PIF_VALUE: 19
PIF_VALUE: 20
PIF_VALUE: 23
PIF_VALUE: 0
PIF_VALUE: 1
PIF_VALUE: 1
PIF_VALUE: 20
PIF_VALUE: 20
PIF_VALUE: 22
PIF_VALUE: 0
PIF_VALUE: 2
PIF_VALUE: 21
PIF_VALUE: 24
PIF_VALUE: 22
PIF_VALUE: 1
PIF_VALUE: 1
PIF_VALUE: 2
PIF_VALUE: 20
PIF_VALUE: 20
PIF_VALUE: 23
PIF_VALUE: 20
PIF_VALUE: 22
PIF_VALUE: 4
PIF_VALUE: 20
PIF_VALUE: 22
PIF_VALUE: 21
PIF_VALUE: 1
PIF_VALUE: 22
PIF_VALUE: 1
PIF_VALUE: 1
PIF_VALUE: 21
PIF_VALUE: 1
PIF_VALUE: 22
PIF_VALUE: 19
PIF_VALUE: 24
PIF_VALUE: 1
PIF_VALUE: 1
PIF_VALUE: 24
PIF_VALUE: 22
PIF_VALUE: 0
PIF_VALUE: 25
PIF_VALUE: 2
PIF_VALUE: 21
PIF_VALUE: 25
PIF_VALUE: 22
PIF_VALUE: 23
PIF_VALUE: 1
PIF_VALUE: 19
PIF_VALUE: 24
PIF_VALUE: 1
PIF_VALUE: 20
PIF_VALUE: 0
PIF_VALUE: 20
PIF_VALUE: 3
PIF_VALUE: 0
PIF_VALUE: 20
PIF_VALUE: 18
PIF_VALUE: 8
PIF_VALUE: 24
PIF_VALUE: 20
PIF_VALUE: 21
PIF_VALUE: 2
PIF_VALUE: 3
PIF_VALUE: 22
PIF_VALUE: 1
PIF_VALUE: 1
PIF_VALUE: 24
PIF_VALUE: 5
PIF_VALUE: 20
PIF_VALUE: 19
PIF_VALUE: 1
PIF_VALUE: 1
PIF_VALUE: 18
PIF_VALUE: 22
PIF_VALUE: 3
PIF_VALUE: 2
PIF_VALUE: 20
PIF_VALUE: 21
PIF_VALUE: 22
PIF_VALUE: 1
PIF_VALUE: 22
PIF_VALUE: 20
PIF_VALUE: 22
PIF_VALUE: 1
PIF_VALUE: 3
PIF_VALUE: 22
PIF_VALUE: 21
PIF_VALUE: 4
PIF_VALUE: 3
PIF_VALUE: 20
PIF_VALUE: 1
PIF_VALUE: 21
PIF_VALUE: 20
PIF_VALUE: 22
PIF_VALUE: 3
PIF_VALUE: 2
PIF_VALUE: 2
PIF_VALUE: 20
PIF_VALUE: 2
PIF_VALUE: 20
PIF_VALUE: 3
PIF_VALUE: 17
PIF_VALUE: 3
PIF_VALUE: 1
PIF_VALUE: 1
PIF_VALUE: 2
PIF_VALUE: 15
PIF_VALUE: 20
PIF_VALUE: 1
PIF_VALUE: 20
PIF_VALUE: 21
PIF_VALUE: 3
PIF_VALUE: 18
PIF_VALUE: 20
PIF_VALUE: 20
PIF_VALUE: 25
PIF_VALUE: 1
PIF_VALUE: 1
PIF_VALUE: 22
PIF_VALUE: 1
PIF_VALUE: 3
PIF_VALUE: 2
PIF_VALUE: 21
PIF_VALUE: 1
PIF_VALUE: 19
PIF_VALUE: 20
PIF_VALUE: 1
PIF_VALUE: 21
PIF_VALUE: 21
PIF_VALUE: 20
PIF_VALUE: 24
PIF_VALUE: 21
PIF_VALUE: 22
PIF_VALUE: 21
PIF_VALUE: 20
PIF_VALUE: 2
PIF_VALUE: 20
PIF_VALUE: 23
PIF_VALUE: 21
PIF_VALUE: 20
PIF_VALUE: 0
PIF_VALUE: 24
PIF_VALUE: 23
PIF_VALUE: 24
PIF_VALUE: 24
PIF_VALUE: 21
PIF_VALUE: 24
PIF_VALUE: 5
PIF_VALUE: 1
PIF_VALUE: 23
PIF_VALUE: 23
PIF_VALUE: 20
PIF_VALUE: 1
PIF_VALUE: 24

## 2021-08-17 ASSESSMENT — PAIN DESCRIPTION - ORIENTATION: ORIENTATION: MID

## 2021-08-17 ASSESSMENT — PAIN SCALES - GENERAL
PAINLEVEL_OUTOF10: 6
PAINLEVEL_OUTOF10: 5
PAINLEVEL_OUTOF10: 8

## 2021-08-17 ASSESSMENT — PAIN DESCRIPTION - LOCATION: LOCATION: CHEST

## 2021-08-17 ASSESSMENT — PAIN DESCRIPTION - PAIN TYPE: TYPE: ACUTE PAIN;SURGICAL PAIN

## 2021-08-17 NOTE — BRIEF OP NOTE
Brief Postoperative Note      Patient: Lucille Lou. YOB: 1955  MRN: [id number]    Date of Procedure: 8/17/2021    Pre-Op Diagnosis: -  TIA  Coronary artery disease complex  Hypertension  Peripheral vascular disease  Cardiomyopathy ischemic  Shortness of breath      Post-Op Diagnosis: Same       Procedure(s):  CORONARY ARTERY BYPASS GRAFTING X3 , INTERNAL MAMMARY ARTERY, SAPHENOUS VEIN GRAFT, ON PUMP WITH LEFT ATRIAL APPENDAGE CLIP  PLACEMENT AND PECTORIALIS NERVE BLOCK    Surgeon(s):  Mckenna Kaur MD    Assistant:  Surgical Assistant: Karma White    Anesthesia: General    Estimated Blood Loss (mL): 387     Complications: None  Pump time 63 minutes cross-clamp time 48 minutes    Specimens:   * No specimens in log *    Implants:  Implant Name Type Inv.  Item Serial No.  Lot No. LRB No. Used Action   DEVICE OCCL CLP L35MM SM FOOTPRINT 1 HND APPL SUTURELESS W/  DEVICE OCCL CLP L35MM SM FOOTPRINT 1 HND APPL SUTURELESS W/  [employer] [id number] N/A 1 Implanted     Findings: Ejection fraction of 30% preop postoperatively 50%    Electronically signed by Mckenna Kaur MD on 8/17/2021 at 4:20 PM

## 2021-08-17 NOTE — ANESTHESIA POSTPROCEDURE EVALUATION
Department of Anesthesiology  Postprocedure Note    Patient: Oh Bledsoe MRN: [id number]  YOB: 1955  Date of evaluation: 8/17/2021  Time:  4:29 PM     Procedure Summary     Date: 08/17/21 Room / Location: [address]    Anesthesia Start: 1216 Anesthesia Stop: 1629    Procedure: CORONARY ARTERY BYPASS GRAFTING X3 , INTERNAL MAMMARY ARTERY, SAPHENOUS VEIN GRAFT, ON PUMP WITH LEFT ATRIAL APPENDAGE CLIP  PLACEMENT AND PECTORIALIS NERVE BLOCK (N/A Chest) Diagnosis: (-)    Surgeons: Rebekah Ritchie MD Responsible Provider: Kit Balbuena MD    Anesthesia Type: general ASA Status: 4          Anesthesia Type: general    Hung Phase I: Hung Score: 10    Hung Phase II:      Last vitals: Reviewed and per EMR flowsheets.        Anesthesia Post Evaluation    Patient location during evaluation: ICU  Patient participation: complete - patient participated  Level of consciousness: awake and alert  Pain scale: see nsg notes   Airway patency: patent  Nausea & Vomiting: no vomiting and no nausea  Complications: no  Cardiovascular status: hemodynamically stable  Respiratory status: face mask and acceptable  Hydration status: stable

## 2021-08-18 ENCOUNTER — APPOINTMENT (OUTPATIENT)
Dept: GENERAL RADIOLOGY | Age: 66
DRG: 236 | End: 2021-08-18
Attending: THORACIC SURGERY (CARDIOTHORACIC VASCULAR SURGERY)
Payer: MEDICARE

## 2021-08-18 LAB
ANION GAP SERPL CALCULATED.3IONS-SCNC: 11 MMOL/L (ref 3–16)
BASE EXCESS ARTERIAL: -4 (ref -3–3)
BUN BLDV-MCNC: 5 MG/DL (ref 7–20)
CALCIUM IONIZED: 1.39 MMOL/L (ref 1.12–1.32)
CALCIUM SERPL-MCNC: 8.5 MG/DL (ref 8.3–10.6)
CHLORIDE BLD-SCNC: 106 MMOL/L (ref 99–110)
CO2: 21 MMOL/L (ref 21–32)
CREAT SERPL-MCNC: 0.7 MG/DL (ref 0.8–1.3)
GFR AFRICAN AMERICAN: >60
GFR NON-AFRICAN AMERICAN: >60
GLUCOSE BLD-MCNC: 109 MG/DL (ref 70–99)
GLUCOSE BLD-MCNC: 114 MG/DL (ref 70–99)
GLUCOSE BLD-MCNC: 118 MG/DL (ref 70–99)
GLUCOSE BLD-MCNC: 118 MG/DL (ref 70–99)
GLUCOSE BLD-MCNC: 119 MG/DL (ref 70–99)
GLUCOSE BLD-MCNC: 127 MG/DL (ref 70–99)
GLUCOSE BLD-MCNC: 132 MG/DL (ref 70–99)
GLUCOSE BLD-MCNC: 147 MG/DL (ref 70–99)
GLUCOSE BLD-MCNC: 152 MG/DL (ref 70–99)
GLUCOSE BLD-MCNC: 86 MG/DL (ref 70–99)
HCO3 ARTERIAL: 22.5 MMOL/L (ref 21–29)
HCT VFR BLD CALC: 31.1 % (ref 40.5–52.5)
HEMOGLOBIN: 10.1 GM/DL (ref 13.5–17.5)
HEMOGLOBIN: 10.9 G/DL (ref 13.5–17.5)
LACTATE: 2.68 MMOL/L (ref 0.4–2)
MAGNESIUM: 2.2 MG/DL (ref 1.8–2.4)
MCH RBC QN AUTO: 35.5 PG (ref 26–34)
MCHC RBC AUTO-ENTMCNC: 35 G/DL (ref 31–36)
MCV RBC AUTO: 101.6 FL (ref 80–100)
O2 SAT, ARTERIAL: 100 % (ref 93–100)
PCO2 ARTERIAL: 44.6 MM HG (ref 35–45)
PDW BLD-RTO: 13.9 % (ref 12.4–15.4)
PERFORMED ON: ABNORMAL
PERFORMED ON: NORMAL
PH ARTERIAL: 7.31 (ref 7.35–7.45)
PLATELET # BLD: 151 K/UL (ref 135–450)
PMV BLD AUTO: 6.9 FL (ref 5–10.5)
PO2 ARTERIAL: 266.6 MM HG (ref 75–108)
POC HEMATOCRIT: 30 % (ref 40.5–52.5)
POC POTASSIUM: 3.8 MMOL/L (ref 3.5–5.1)
POC SAMPLE TYPE: ABNORMAL
POC SODIUM: 138 MMOL/L (ref 136–145)
POTASSIUM SERPL-SCNC: 4 MMOL/L (ref 3.5–5.1)
RBC # BLD: 3.06 M/UL (ref 4.2–5.9)
SODIUM BLD-SCNC: 138 MMOL/L (ref 136–145)
TCO2 ARTERIAL: 24 MMOL/L
WBC # BLD: 9 K/UL (ref 4–11)

## 2021-08-18 PROCEDURE — 99024 POSTOP FOLLOW-UP VISIT: CPT | Performed by: NURSE PRACTITIONER

## 2021-08-18 PROCEDURE — 6370000000 HC RX 637 (ALT 250 FOR IP): Performed by: THORACIC SURGERY (CARDIOTHORACIC VASCULAR SURGERY)

## 2021-08-18 PROCEDURE — 6360000002 HC RX W HCPCS: Performed by: THORACIC SURGERY (CARDIOTHORACIC VASCULAR SURGERY)

## 2021-08-18 PROCEDURE — 2580000003 HC RX 258: Performed by: THORACIC SURGERY (CARDIOTHORACIC VASCULAR SURGERY)

## 2021-08-18 PROCEDURE — 80048 BASIC METABOLIC PNL TOTAL CA: CPT

## 2021-08-18 PROCEDURE — 7100000010 HC PHASE II RECOVERY - FIRST 15 MIN

## 2021-08-18 PROCEDURE — 2140000000 HC CCU INTERMEDIATE R&B

## 2021-08-18 PROCEDURE — 2700000000 HC OXYGEN THERAPY PER DAY

## 2021-08-18 PROCEDURE — 7100000011 HC PHASE II RECOVERY - ADDTL 15 MIN

## 2021-08-18 PROCEDURE — 97167 OT EVAL HIGH COMPLEX 60 MIN: CPT

## 2021-08-18 PROCEDURE — 97116 GAIT TRAINING THERAPY: CPT

## 2021-08-18 PROCEDURE — P9045 ALBUMIN (HUMAN), 5%, 250 ML: HCPCS | Performed by: THORACIC SURGERY (CARDIOTHORACIC VASCULAR SURGERY)

## 2021-08-18 PROCEDURE — 97530 THERAPEUTIC ACTIVITIES: CPT

## 2021-08-18 PROCEDURE — 97163 PT EVAL HIGH COMPLEX 45 MIN: CPT

## 2021-08-18 PROCEDURE — 94669 MECHANICAL CHEST WALL OSCILL: CPT

## 2021-08-18 PROCEDURE — 71045 X-RAY EXAM CHEST 1 VIEW: CPT

## 2021-08-18 PROCEDURE — 85027 COMPLETE CBC AUTOMATED: CPT

## 2021-08-18 PROCEDURE — 94640 AIRWAY INHALATION TREATMENT: CPT

## 2021-08-18 PROCEDURE — 94761 N-INVAS EAR/PLS OXIMETRY MLT: CPT

## 2021-08-18 PROCEDURE — 97110 THERAPEUTIC EXERCISES: CPT

## 2021-08-18 PROCEDURE — 83735 ASSAY OF MAGNESIUM: CPT

## 2021-08-18 RX ADMIN — Medication 10 ML: at 20:50

## 2021-08-18 RX ADMIN — POTASSIUM CHLORIDE 10 MEQ: 10 TABLET, EXTENDED RELEASE ORAL at 13:18

## 2021-08-18 RX ADMIN — CEFAZOLIN 2000 MG: 10 INJECTION, POWDER, FOR SOLUTION INTRAVENOUS at 04:17

## 2021-08-18 RX ADMIN — Medication 10 ML: at 09:22

## 2021-08-18 RX ADMIN — POTASSIUM CHLORIDE 20 MEQ: 29.8 INJECTION, SOLUTION INTRAVENOUS at 09:05

## 2021-08-18 RX ADMIN — MAGNESIUM OXIDE TAB 400 MG (241.3 MG ELEMENTAL MG) 400 MG: 400 (241.3 MG) TAB at 09:15

## 2021-08-18 RX ADMIN — VANCOMYCIN HYDROCHLORIDE 1500 MG: 10 INJECTION, POWDER, LYOPHILIZED, FOR SOLUTION INTRAVENOUS at 00:59

## 2021-08-18 RX ADMIN — METOPROLOL TARTRATE 12.5 MG: 25 TABLET, FILM COATED ORAL at 09:16

## 2021-08-18 RX ADMIN — CEFAZOLIN 2000 MG: 10 INJECTION, POWDER, FOR SOLUTION INTRAVENOUS at 13:23

## 2021-08-18 RX ADMIN — ATORVASTATIN CALCIUM 40 MG: 40 TABLET, FILM COATED ORAL at 20:28

## 2021-08-18 RX ADMIN — CLOPIDOGREL BISULFATE 75 MG: 75 TABLET ORAL at 09:15

## 2021-08-18 RX ADMIN — POTASSIUM CHLORIDE 10 MEQ: 10 TABLET, EXTENDED RELEASE ORAL at 09:15

## 2021-08-18 RX ADMIN — FAMOTIDINE 20 MG: 20 TABLET ORAL at 20:30

## 2021-08-18 RX ADMIN — MAGNESIUM OXIDE TAB 400 MG (241.3 MG ELEMENTAL MG) 400 MG: 400 (241.3 MG) TAB at 20:50

## 2021-08-18 RX ADMIN — METOPROLOL TARTRATE 12.5 MG: 25 TABLET, FILM COATED ORAL at 20:27

## 2021-08-18 RX ADMIN — Medication 15 ML: at 20:29

## 2021-08-18 RX ADMIN — FUROSEMIDE 20 MG: 10 INJECTION, SOLUTION INTRAMUSCULAR; INTRAVENOUS at 09:09

## 2021-08-18 RX ADMIN — POTASSIUM CHLORIDE 20 MEQ: 29.8 INJECTION, SOLUTION INTRAVENOUS at 06:01

## 2021-08-18 RX ADMIN — ALBUTEROL SULFATE 2.5 MG: 2.5 SOLUTION RESPIRATORY (INHALATION) at 20:59

## 2021-08-18 RX ADMIN — FUROSEMIDE 20 MG: 10 INJECTION, SOLUTION INTRAMUSCULAR; INTRAVENOUS at 19:07

## 2021-08-18 RX ADMIN — ASPIRIN 81 MG: 81 TABLET, COATED ORAL at 09:15

## 2021-08-18 RX ADMIN — ALBUTEROL SULFATE 2.5 MG: 2.5 SOLUTION RESPIRATORY (INHALATION) at 15:30

## 2021-08-18 RX ADMIN — ACETAMINOPHEN 650 MG: 325 TABLET ORAL at 19:08

## 2021-08-18 RX ADMIN — ACETAMINOPHEN 650 MG: 325 TABLET ORAL at 13:18

## 2021-08-18 RX ADMIN — FUROSEMIDE 20 MG: 10 INJECTION, SOLUTION INTRAMUSCULAR; INTRAVENOUS at 20:43

## 2021-08-18 RX ADMIN — ALBUMIN (HUMAN) 25 G: 12.5 INJECTION, SOLUTION INTRAVENOUS at 07:30

## 2021-08-18 RX ADMIN — MUPIROCIN: 20 OINTMENT TOPICAL at 09:23

## 2021-08-18 RX ADMIN — INSULIN GLARGINE 13 UNITS: 100 INJECTION, SOLUTION SUBCUTANEOUS at 20:43

## 2021-08-18 RX ADMIN — Medication 15 ML: at 09:22

## 2021-08-18 RX ADMIN — ALBUTEROL SULFATE 2.5 MG: 2.5 SOLUTION RESPIRATORY (INHALATION) at 11:22

## 2021-08-18 RX ADMIN — FAMOTIDINE 20 MG: 20 TABLET ORAL at 09:15

## 2021-08-18 RX ADMIN — FUROSEMIDE 20 MG: 10 INJECTION, SOLUTION INTRAMUSCULAR; INTRAVENOUS at 13:18

## 2021-08-18 RX ADMIN — ALBUTEROL SULFATE 2.5 MG: 2.5 SOLUTION RESPIRATORY (INHALATION) at 07:54

## 2021-08-18 RX ADMIN — VANCOMYCIN HYDROCHLORIDE 1500 MG: 10 INJECTION, POWDER, LYOPHILIZED, FOR SOLUTION INTRAVENOUS at 23:31

## 2021-08-18 RX ADMIN — POTASSIUM CHLORIDE 10 MEQ: 10 TABLET, EXTENDED RELEASE ORAL at 19:07

## 2021-08-18 RX ADMIN — ACETAMINOPHEN 650 MG: 325 TABLET ORAL at 23:24

## 2021-08-18 RX ADMIN — CEFAZOLIN 2000 MG: 10 INJECTION, POWDER, FOR SOLUTION INTRAVENOUS at 20:34

## 2021-08-18 RX ADMIN — MUPIROCIN: 20 OINTMENT TOPICAL at 20:29

## 2021-08-18 ASSESSMENT — PAIN SCALES - GENERAL
PAINLEVEL_OUTOF10: 0
PAINLEVEL_OUTOF10: 3
PAINLEVEL_OUTOF10: 0
PAINLEVEL_OUTOF10: 1
PAINLEVEL_OUTOF10: 0
PAINLEVEL_OUTOF10: 0

## 2021-08-18 NOTE — PROGRESS NOTES
Physical Therapy    Facility/Department: [facility] C2 CARD TELEMETRY  Initial Assessment    NAME: Daniel Hills. : 1955  MRN: [id number]    Date of Service: 2021    Discharge Recommendations:  24 hour supervision or assist   PT Equipment Recommendations  Equipment Needed: No    Assessment   Body structures, Functions, Activity limitations: Decreased functional mobility ; Decreased balance;Decreased posture;Decreased endurance  Assessment: Pt is 61 yo male who presents s/p CABG x 3 . Pt is indep with mobility at baseline. Grossly min A for mobility with 4WW this date. Cues to maintain sternal precautions throughout session and for posture. Pt would benefit from continued skilled therapy to address deficits. Recommend home with 24-hr supervision at d/c. Treatment Diagnosis: impaired functional mobility  Specific instructions for Next Treatment: progress mobility as tolerated  Prognosis: Good  Decision Making: High Complexity  PT Education: Goals; General Safety;Gait Training;PT Role;Disease Specific Education;Plan of Care; Functional Mobility Training;Transfer Training  Disease Specific Education: Pt educated on sternal precautions, safe mobility, energy conservation, and task modification. Pt verbalized understanding  Barriers to Learning: none  REQUIRES PT FOLLOW UP: Yes  Activity Tolerance  Activity Tolerance: Patient Tolerated treatment well;Patient limited by fatigue;Patient limited by endurance  Activity Tolerance: BP 96/58, HR 98; SpO2 94% on RA      Patient Diagnosis(es): The encounter diagnosis was Preop testing. has a past medical history of Blind in both eyes, CAD (coronary artery disease), Constipation, Histoplasmosis, Hyperlipidemia, Hypertension, and PVD (peripheral vascular disease) ([facility] [address] has a past surgical history that includes eye surgery (Bilateral);  Femoral-femoral Bypass Graft (Bilateral, 2021); and vascular surgery.     Restrictions  Restrictions/Precautions  Restrictions/Precautions: General Precautions, Fall Risk  Position Activity Restriction  Sternal Precautions: No Pushing, No Pulling, 5# Lifting Restrictions  Other position/activity restrictions: ambulate, up in chair; clay, 2 chest tubes; pt is legally blind  Vision/Hearing  Vision: Impaired  Vision Exceptions: Legally blind (can see right peripheral vision)  Hearing: Within functional limits     Subjective  General  Chart Reviewed: Yes  Patient assessed for rehabilitation services?: Yes  Response To Previous Treatment: Not applicable  Family / Caregiver Present: No  Referring Practitioner: Dr. Vanessa Gallegos MD  Referral Date : 08/18/21  Diagnosis: CABG x 3 8/17/2021  Follows Commands: Within Functional Limits  General Comment  Comments: Pt up in chair on approach; RN cleared pt for therapy  Subjective  Subjective: pt agreeable to therapy  Pain Screening  Patient Currently in Pain: No    Orientation  Orientation  Overall Orientation Status: Within Functional Limits  Social/Functional History  Social/Functional History  Lives With: Spouse, Son (30yr old son)  Type of Home: Trailer  Home Layout: One level  Home Access: Stairs to enter with rails  Entrance Stairs - Number of Steps: 3 ISIDRO  Entrance Stairs - Rails: Both  Bathroom Shower/Tub: Tub/Shower unit  Bathroom Toilet: Standard  Bathroom Equipment: Shower chair  Home Equipment:  (no home equipment)  ADL Assistance: Independent  Homemaking Responsibilities: Yes  Meal Prep Responsibility: Primary  Laundry Responsibility: No  Cleaning Responsibility: No  Shopping Responsibility: No  Ambulation Assistance: Independent  Transfer Assistance: Independent  Active : No  Occupation: On disability  Type of occupation:   Leisure & Hobbies: fishing, audiobooks    Objective     RLE AROM: WFL  LLE AROM : WFL  Strength RLE: WFL  Strength LLE: WFL     Sensation  Overall Sensation Status: WFL     Bed mobility  Supine to Sit: Unable to assess (pt up in chair at start of session)  Sit to Supine: Moderate assistance;2 Person assistance (Cues for technique to maintain sternal precautions)     Transfers  Sit to Stand: Minimal Assistance (cues for hand placement to maintain sternal precautions)  Stand to sit: Minimal Assistance     Ambulation  Ambulation?: Yes  Ambulation 1  Surface: level tile  Device: Rollator  Assistance: Minimal assistance  Quality of Gait: Min A for balance and to negotiate 4WW. Cues for posture and sequencing. Mild unsteadiness with increased CODEY. Denies dizziness  Gait Deviations: Slow Cyndee; Shuffles;Decreased step length;Decreased step height  Distance: 30 ft     Balance  Sitting - Static: Good  Sitting - Dynamic: Good  Standing - Static: Fair;+  Standing - Dynamic: Fair     Exercises  Quad Sets: x 10 BLE  Gluteal Sets: x 10 BLE  Ankle Pumps: x 10 BLE  Comments: Cues for technique     Plan   Plan  Times per week: 5-7x/wk while in ICU  Times per day: Daily  Specific instructions for Next Treatment: progress mobility as tolerated  Current Treatment Recommendations: Strengthening, Neuromuscular Re-education, Safety Education & Training, Balance Training, Endurance Training, Functional Mobility Training, Transfer Training, Gait Training, Equipment Evaluation, Education, & procurement, Patient/Caregiver Education & Training, Stair training  Safety Devices  Type of devices: All fall risk precautions in place, Call light within reach, Gait belt, Nurse notified, Left in bed                                   AM-PAC Score  AM-PAC Inpatient Mobility Raw Score : 15 (08/18/21 1053)  AM-PAC Inpatient T-Scale Score : 39.45 (08/18/21 1053)  Mobility Inpatient CMS 0-100% Score: 57.7 (08/18/21 1053)  Mobility Inpatient CMS G-Code Modifier : CK (08/18/21 1053)          Goals  Short term goals  Time Frame for Short term goals: 1 week (8/25) unless otherwise specified  Short term goal 1: Pt will be SBA for bed mobility.   Short term goal 2: Pt will be supervision for transfers without device. Short term goal 3: Pt will ambulate 100 ft without device and supervision. Short term goal 4: Pt will negotiate 3 stairs with rails and SBA. Short term goal 5: 8/21: Pt will participate in 12-15 reps of BLE exercises to promote strength and activity tolerance. Patient Goals   Patient goals : \"to go home\"       Therapy Time   Individual Concurrent Group Co-treatment   Time In 0815         Time Out 0848         Minutes 33         Timed Code Treatment Minutes: DAHLIA Gorman, DPT  If pt is unable to be seen after this session, please let this note serve as discharge summary. Please see case management note for discharge disposition. Thank you.

## 2021-08-18 NOTE — PROGRESS NOTES
CVTS Cardiothoracic Progress Note:                                CC:  Post op follow up     Surgery: 8/17 CORONARY ARTERY BYPASS GRAFTING X3 , INTERNAL MAMMARY ARTERY, SAPHENOUS VEIN GRAFT, ON PUMP WITH LEFT ATRIAL APPENDAGE CLIP  PLACEMENT AND PECTORIALIS NERVE BLOCK     Hospital course:   8/18 Up in chair, remains SR. Easily conversational and in NAD.      Subjective:   Dietary Intake: improving   no Nausea   Pain Control: controlled  Complaints: mild pain from chest tubes  Bowels: have not moved    Past Medical History:   Diagnosis Date    Blind in both eyes     legally blind - has minimal peripheral vision right eye    CAD (coronary artery disease)     Constipation     Histoplasmosis     Hyperlipidemia     Hypertension     PVD (peripheral vascular disease) ([address]         Past Surgical History:   Procedure Laterality Date    EYE SURGERY Bilateral     general anesthesia x1     FEMORAL-FEMORAL BYPASS GRAFT Bilateral 6/28/2021    FEMORAL TO FEMORAL BYPASS GRAFT WITH RIGHT LOWER EXTREMITY ANGIOGRAM performed by Christina Cabrales MD at [place] [id number] as of 08/13/2021    (No Known Allergies)        Patient Active Problem List   Diagnosis    TIA (transient ischemic attack)    Low back pain    HTN (hypertension)    Tobacco abuse    Other hyperlipidemia    Stroke-like symptom    Claudication of both upper extremities due to atherosclerosis ([address]    Ischemic rest pain of lower extremity    PVD (peripheral vascular disease) ([address]    Cardiomyopathy ([address]    S/P right femoral-femoral bypass surgery 6/28/21 Dr. Dusty Lawson     SOB (shortness of breath) on exertion    3-vessel CAD        Vital Signs: /73   Pulse 98   Temp 98.9 °F (37.2 °C) (Oral)   Resp 22   Ht 5' 8\" (1.727 m)   Wt 212 lb 12.8 oz (96.5 kg)   SpO2 100%   BMI 32.36 kg/m²  O2 Flow Rate (L/min): 2 L/min     Admission Weight: Weight: 206 lb (93.4 kg)    Weight on 8/17 (89.8 kg) Pre-op   Weight on 8/18 (96.5 kg) +6.7 kg    Intake/Output:     Intake/Output Summary (Last 24 hours) at 8/18/2021 0956  Last data filed at 8/18/2021 0800  Gross per 24 hour   Intake 2700 ml   Output 2859 ml   Net -159 ml     Extubation Time: 8/17 at 16:08  Transition Time: 8/18 at 07:40    LABORATORY DATA:     CBC:   Recent Labs     08/16/21  0110 08/17/21  1254 08/17/21  1520 08/17/21  1640 08/18/21  0400   WBC 8.1  --   --  11.0 9.0   HGB 14.0   < > 7.8* 10.9* 10.9*   HCT 40.0*  --   --  30.8* 31.1*   .2*  --   --  101.5* 101.6*     --   --  137 151    < > = values in this interval not displayed. BMP:   Recent Labs     08/16/21  0110 08/17/21  1640 08/17/21  2100 08/18/21  0400    138  --  138   K 4.0 4.0 3.9 4.0    107  --  106   CO2 23 20*  --  21   BUN 9 5*  --  5*   CREATININE 0.8 0.7*  --  0.7*     MG:    Recent Labs     08/17/21  1640 08/18/21  0400   MG 2.80* 2.20      Cardiac Enzymes:   Recent Labs     08/16/21  0110 08/16/21  0355   TROPONINI <0.01 <0.01     CXR: 8/18/21   Impression   Increased left basilar opacity likely atelectasis in this postoperative   patient         ______________________________________________________    Objective:   General appearance: awake, A&O, in NAD  Lungs: diminished in bases  Heart: S1S2 normal; SR on monitor  Chest: symmetrical expansion with inspiration and expirations; No rocking of sternum noted   Abdomen: rounded, soft, non-tender  Bowel sounds: normoactive  Kidneys: UOP -580= 2495 ml in 24 hrs; Cr 0.7  Wound/Incisions: Midsternal incision with dressing CDI; RLE incisions with dressings CDI;  Pacing wires taped and secured  Extremities: BLE pulses palpable but soft; 1+ swelling noted in BLE's  Neurological: intact, non focal exam  Chest tubes/Drains: Chest tube # 1 with 100-65= 165 ml serosanguinous drainage in 16 hours overnight; Chest tube # 2 with 94-55= 149 ml serosanguinous drainage in 16 hours overnight; no airleaks noted in either tube     Assessment: Post-op: 1 days. Condition: In stable condition. Plan:   1. Cardiovascular: s/p CABG x3 w/ clip- BB, ASA, Statin  Will wean dobutamine as tolerated. 2. Pulmonary: needs expansion- IS, acapella, oobtc, PT/OT  Chest tubes placed to water seal.    3. Neurology: analgesia as needed    4. Nephrology: diurese as tolerated; continue IV lasix 20 mg QID    5. Endocrinology: insulin gtt    6. Hematology: acute blood loss anemia; H&H stable     7. Microbiology: nothing at this time    8. Nutrition: ADAT    9. Labs: reviewed as above    10. Post-op Drains/Wires: will d/c clay and possibly TPW's this afternoon    11. D/C Goals: DCP following; likely home with family and Jasmin  in the next 3-4 days.     12. Continue post-op care of patient in the ICU    Meds:    The patient is on a beta-blocker   The patient is not on an ace-i/ARB- 2/2 hypotension   The patient is on a statin   The patient is on oral antiplatelet therapy   ______________________________________________________    JANET Kay - CNP  8/18/2021  9:56 AM

## 2021-08-18 NOTE — OP NOTE
Operative Note      Patient: Doris York. YOB: 1955  MRN: [id number]    Date of Procedure: 8/17/2021    Pre-Op Diagnosis: -Complex coronary artery disease proximal LAD  Hypertension  Peripheral vascular disease  Ischemic cardiomyopathy  TIA    Post-Op Diagnosis: Same       Procedure(s):  CORONARY ARTERY BYPASS GRAFTING X3 , INTERNAL MAMMARY ARTERY, SAPHENOUS VEIN GRAFT, ON PUMP WITH LEFT ATRIAL APPENDAGE CLIP  PLACEMENT AND PECTORIALIS NERVE BLOCK    Surgeon(s):  Javi Moreno MD    Assistant:   Surgical Assistant: Bruce Cobb    Anesthesia: General    Estimated Blood Loss (mL): 477    Complications: None  Pump time 63 minutes  Cross-clamp time 48 minutes    Implants:  Implant Name Type Inv. Item Serial No.  Lot No. LRB No. Used Action   DEVICE OCCL CLP L35MM SM FOOTPRINT 1 HND APPL SUTURELESS W/  DEVICE OCCL CLP L35MM SM FOOTPRINT 1 HND APPL SUTURELESS W/  [employer]WD [id number] N/A 1 Implanted      Findings: Transesophageal echo hypokinesis inferior wall preoperative congestive heart failure was 30% respond well to bypass chemical support to improve to 60% postoperatively no valvular disease    Detailed Description of Procedure:   Patient was taken to the operating room after informative consent was obtained lying supine under general anesthesia ET tube was placed with no difficulty introducer arterial line was placed by anesthesia with no complication  Patient was prepped and draped in standard fashion pause for the cause was done in standard manner two team approach was started Endo vein harvest was obtained from the right leg great saphenous vein was identified tunnel was developed all side branches was transected and cauterized.  Midline incision was made Bovie was used to cut through the subcutaneous tissue and fat sternotomy was performed left chest was suspended mammary was harvested by using Bovie and multiple clips patient was heparinized on optimal ACT placed on cardiopulmonary bypass through an ascending aortic cannula and two stage atrial venous cannula cardioplegia was administered in antegrade fashion total time of cross-clamp was 48 minutes pump time was 63 minutes cardioplegia was administered in an initial dose and subsequent after each proximal distal  Reverse saphenous vein was used to reconstruct anastomosis to PDA good flow with no leakage was seen proximal was performed to the ascending aorta using 6-0 Prolene in end-to-side fashion. Next reverse saphenous vein was used to reconstruct response to OM1 good flow with no leakage was seen proximal was performed to the ascending aorta by using 6-0 Prolene in end-to-side fashion. Atrial appendage was measured to 35 clip was applied. End of procedure transesophageal echo showed no flow. Next LIMA was introduced through the pericardium. LIMA to LAD anastomosis was performed in end-to-side fashion good flow with no leakage was seen hotshot was administered tolerated well by the patient's (rhythm was regained patient weaned off cardiopulmonary bypass after placement of 2V wire and two atrial wire. Protamine was administered tolerated well by the patient's hemostasis was secured venous and arterial cannula was removed oversewn by 4-0 Prolene. One mediastinal tube one chest tube was placed sternotomy was closed using wires soft tissue and skin was closed in layers patient was dispositioned to recovery room in stable condition without no complication counts were told is correct x2.     Electronically signed by Henry Liu MD on 8/17/2021 at 8:07 PM

## 2021-08-18 NOTE — PROGRESS NOTES
MD order to remove pacing wires. Procedure explained to pt. Pulled Atrial wires w/o ectopy. Pulled Ventricular wires w/o ecopy. Dry sterile dsg applied. Heart tones noted and WNL. VS per protocol.  Myra Dugan RN

## 2021-08-18 NOTE — CARE COORDINATION
CASE MANAGEMENT INITIAL ASSESSMENT    Reviewed chart and completed assessment with pt. Explained Case Management role/services. Health Care Decision Maker :   Primary Decision Maker: Carlene Dixon - [phone]    Secondary Decision Maker: Jimmy Palumbo - [phone]        Admit date/status: 8/17 Inpatient  Diagnosis: CABG   Is this a Readmission?:  No      Insurance: Medicare   Precert required for SNF: No       3 night stay required: No, waived during pandemic. Living arrangements, Adls, care needs, prior to admission: Pt lives at home with wife and son, they are able to provide 24hr assist.  Pt does not use DME at home, nor does he have any home care. Transportation: Pt to arrange     PT/OT recs: 24 hour supervision or assist     Barriers to discharge: None noted    Plan/comments: Pt plans to return home with family support, agreeable to home care for skilled nurse. Pt has no home care agency preference, writer gave referral to [facility], they can accept and will follow via Epic. Pt denies any other discharge needs at this time. ECOC on chart for MD signature.   TAMERA Connors-RN

## 2021-08-18 NOTE — PROGRESS NOTES
Order received for arterial line removal.  R brachial arterial line discontinued. Manual pressure held until hemostasis achieved. No hematoma, no oozing noted. Patient tolerated well.  Winter Cordon RN

## 2021-08-18 NOTE — PROGRESS NOTES
Patient met criteria per open heart protocol to transition to stepdown level of care.  Per MD R brachial jojo continued, Lindy Gustafson RN

## 2021-08-18 NOTE — CONSULTS
Comprehensive Nutrition Assessment    Type and Reason for Visit:  Initial    Nutrition Recommendations/Plan:   1. Continue general diet  2. Add high protein ONS TID  3. Encourage nutrition  4. Follow up with heart healthy education as time allows p/t d/c  5. Monitor nutrition adequacy, pertinent labs, bowel habits, wt changes, and clinical progress    Nutrition Assessment:  Consult for diet education. Pt is a 61 y/o male s/p CABG 8/17. Ordered on regular diet. About to eat breakfast. Pt reports that his appetite has been decreased for a while and he has lost wt unintentionally p/t surgery. RD counseled pt on the importance of adequate nutrition for healing post op. Pt agrees to trial ONS for added protein/calories. Will monitor. Malnutrition Assessment:  Malnutrition Status: At risk for malnutrition (Comment)    Context:  Acute Illness       Estimated Daily Nutrient Needs:  Energy (kcal):  2096-7841; Weight Used for Energy Requirements:  Current (97 kg)     Protein (g):  78-97; Weight Used for Protein Requirements:  Current (.8-1)        Fluid (ml/day):  1 mL/kcal; Method Used for Fluid Requirements:  1 ml/kcal      Nutrition Related Findings:  Chest tubes in place      Wounds:  None       Current Nutrition Therapies:    ADULT DIET; Regular; 1500 ml  Adult Oral Nutrition Supplement; Standard High Calorie/High Protein Oral Supplement    Anthropometric Measures:  · Height: 5' 8\" (172.7 cm)  · Current Body Weight: 212 lb (96.2 kg)   · Admission Body Weight: 198 lb (89.8 kg) (suspect dry wt)    · Usual Body Weight: 226 lb (102.5 kg)     · Ideal Body Weight: 154 lbs  · BMI: 32.2  · BMI Categories: Obese Class 1 (BMI 30.0-34. 9)       Nutrition Diagnosis:   · Inadequate oral intake related to early satiety as evidenced by poor intake prior to admission      Nutrition Interventions:   Food and/or Nutrient Delivery:  Continue Current Diet, Start Oral Nutrition Supplement  Nutrition Education/Counseling:  Education initiated, Counseling initiated   Coordination of Nutrition Care:  Continue to monitor while inpatient    Goals: Tolerate diet and consume greater than 50% of meals and ONS this admission         Discharge Planning:    Continue current diet     Electronically signed by Love He.  Ami Lantigua RD, LD on 8/18/21 at 10:17 AM EDT    Contact: [phone]

## 2021-08-18 NOTE — PLAN OF CARE
Problem: Pain:  Goal: Pain level will decrease  Description: Pain level will decrease  Outcome: Ongoing  Goal: Control of acute pain  Description: Control of acute pain  Outcome: Ongoing  Goal: Control of chronic pain  Description: Control of chronic pain  Outcome: Ongoing     Problem: Discharge Planning:  Goal: Discharged to appropriate level of care  Description: Discharged to appropriate level of care  Outcome: Ongoing     Problem:  Activity Intolerance:  Goal: Able to perform prescribed physical activity  Description: Able to perform prescribed physical activity  Outcome: Ongoing  Goal: Ability to tolerate increased activity will improve  Description: Ability to tolerate increased activity will improve  Outcome: Ongoing     Problem: Anxiety:  Goal: Level of anxiety will decrease  Description: Level of anxiety will decrease  Outcome: Ongoing     Problem: Cardiac Output - Decreased:  Goal: Cardiac output within specified parameters  Description: Cardiac output within specified parameters  Outcome: Ongoing  Goal: Hemodynamic stability will improve  Description: Hemodynamic stability will improve  Outcome: Ongoing     Problem: Fluid Volume - Imbalance:  Goal: Ability to achieve a balanced intake and output will improve  Description: Ability to achieve a balanced intake and output will improve  Outcome: Ongoing  Goal: Chest tube drainage is within specified parameters  Description: Chest tube drainage is within specified parameters  Outcome: Ongoing     Problem: Gas Exchange - Impaired:  Goal: Levels of oxygenation will improve  Description: Levels of oxygenation will improve  Outcome: Ongoing  Goal: Ability to maintain adequate ventilation will improve  Description: Ability to maintain adequate ventilation will improve  Outcome: Ongoing     Problem: Pain:  Goal: Pain level will decrease  Description: Pain level will decrease  Outcome: Ongoing  Goal: Control of acute pain  Description: Control of acute pain  Outcome: Ongoing  Goal: Control of chronic pain  Description: Control of chronic pain  Outcome: Ongoing     Problem: Tissue Perfusion - Cardiopulmonary, Altered:  Goal: Absence of angina  Description: Absence of angina  Outcome: Ongoing  Goal: Hemodynamic stability will improve  Description: Hemodynamic stability will improve  Outcome: Ongoing  Goal: Will show no evidence of cardiac arrhythmias  Description: Will show no evidence of cardiac arrhythmias  Outcome: Ongoing     Problem: Tobacco Use:  Goal: Will participate in inpatient tobacco-use cessation counseling  Description: Will participate in inpatient tobacco-use cessation counseling  Outcome: Ongoing

## 2021-08-18 NOTE — PROGRESS NOTES
Occupational Therapy   Occupational Therapy Initial Assessment/Treatment  Date: 2021   Patient Name: Maru Thurston. MRN: [id number]     : 1955    Date of Service: 2021    Discharge Recommendations:  Home with Home health OT, 24 hour supervision or assist       Assessment   Performance deficits / Impairments: Decreased functional mobility ; Decreased ADL status; Decreased strength;Decreased endurance;Decreased balance  Patient evaluated in [facility] ICU s/p CABG x 3 2021. Pt independent PTA, lives with family who can provide 24hr. After evaluation, pt found to be presenting with the above mentioned occupational performance deficits which are affecting participation in daily living skills. Pt Destinee with rollator for functional mobility and transfers with maxA for LE donning shoes. Pt would benefit from continued skilled occupational therapy to address ADLs, functional mobility, and safety while in acute care. Prognosis: Good  Decision Making: High Complexity  OT Education: OT Role;Plan of Care;Precautions; ADL Adaptive Strategies;Transfer Training;Equipment  Patient Education: Disease specific: Disease Specific Education: Pt educated on sternal precautions, safe mobility, energy conservation, and task modification. Pt verbalized understanding'  REQUIRES OT FOLLOW UP: Yes  Activity Tolerance  Activity Tolerance: Patient Tolerated treatment well  Activity Tolerance: Vitals: BP 96/58, HR 98; SpO2 94% on RA  Safety Devices  Safety Devices in place: Yes  Type of devices: Gait belt;Call light within reach; Bed alarm in place; Left in bed;Nurse notified           Patient Diagnosis(es): The encounter diagnosis was Preop testing. has a past medical history of Blind in both eyes, CAD (coronary artery disease), Constipation, Histoplasmosis, Hyperlipidemia, Hypertension, and PVD (peripheral vascular disease) ([facility] [address] has a past surgical history that includes eye surgery (Bilateral);  Femoral-femoral Bypass Graft (Bilateral, 6/28/2021); and vascular surgery. Restrictions  Restrictions/Precautions  Restrictions/Precautions: General Precautions, Fall Risk  Position Activity Restriction  Sternal Precautions: No Pushing, No Pulling, 5# Lifting Restrictions  Other position/activity restrictions: ambulate, up in chair; clay, 2 chest tubes; pt is legally blind    Subjective   General  Patient assessed for rehabilitation services?: Yes  Family / Caregiver Present: No  Referring Practitioner: Lani Griggs MD 8/17/21  Diagnosis: CABG x 3 8/17/2021  Subjective  Subjective: Pt pleasant and agreeable to OT evaluation, states tired and would like to get back to bed. Pt only has R peripheal vision.   Patient Currently in Pain: No  Social/Functional History  Social/Functional History  Lives With: Spouse, Son (30yr old son)  Type of Home: Trailer  Home Layout: One level  Home Access: Stairs to enter with rails  Entrance Stairs - Number of Steps: 3 ISIDRO  Entrance Stairs - Rails: Both  Bathroom Shower/Tub: Tub/Shower unit  Bathroom Toilet: Standard  Bathroom Equipment: Shower chair  Home Equipment:  (no home equipment)  ADL Assistance: Independent  Homemaking Responsibilities: Yes  Meal Prep Responsibility: Primary  Laundry Responsibility: No  Cleaning Responsibility: No  Shopping Responsibility: No  Ambulation Assistance: Independent  Transfer Assistance: Independent  Active : No  Occupation: On disability  Type of occupation:   Leisure & Hobbies: fishing, audiobooks       Objective   Vision: Impaired  Vision Exceptions: Legally blind (can see R peripheal vision)  Hearing: Within functional limits          Balance  Sitting Balance: Supervision  Standing Balance: Minimal assistance (with 4ww)  Functional Mobility  Functional - Mobility Device: 4-Wheeled Walker  Activity: Other (30ft into hallway)  Assist Level: Minimal assistance     ADL  LE Dressing: Dependent/Total (donning tennis shoes)  Toileting: Dependent/Total     Tone RUE  RUE Tone: Normotonic  Tone LUE  LUE Tone: Normotonic  Coordination  Movements Are Fluid And Coordinated: Yes     Bed mobility  Supine to Sit: Unable to assess (pt up in chair upon entry)  Sit to Supine: Moderate assistance;2 Person assistance (cues for sternal precautions)     Transfers  Stand Step Transfers: Minimal assistance (w/ 4ww)  Sit to stand: Minimal assistance (up to 4ww with cues for sternal precautions)  Stand to sit: Minimal assistance     Cognition  Overall Cognitive Status: WFL        Sensation  Overall Sensation Status: WFL        LUE AROM (degrees)  LUE AROM : WFL  RUE AROM (degrees)  RUE AROM : WFL  LUE Strength  Gross LUE Strength: WFL  RUE Strength  Gross RUE Strength: WFL         Plan   Plan  Times per week: 4-6x's a week while in icu      AM-PAC Score        AM-PAC Inpatient Daily Activity Raw Score: 12 (08/18/21 1321)  AM-PAC Inpatient ADL T-Scale Score : 30.6 (08/18/21 1321)  ADL Inpatient CMS 0-100% Score: 66.57 (08/18/21 1321)  ADL Inpatient CMS G-Code Modifier : CL (08/18/21 1321)    Goals  Short term goals  Time Frame for Short term goals: 1 week unless otherwise specified 8/25  Short term goal 1: Pt will complete LE dressing with Destinee  Short term goal 2: Pt will complete toilet transfers with CGA by 8/23  Short term goal 3: Pt will complete standing level ADLs with SBA for balance  Patient Goals   Patient goals : \"to go home\"       Therapy Time   Individual Concurrent Group Co-treatment   Time In 0815         Time Out 0840         Minutes 25         Timed Code Treatment Minutes: 15 Minutes       Mil Mueller OTR/L  If pt is unable to be seen after this session, please let this note serve as discharge summary. Please see case management note for discharge disposition. Thank you.

## 2021-08-18 NOTE — PROCEDURES
[facility]                 [place]                                PULMONARY FUNCTION    PATIENT NAME: Maryan Ronquillo                      :        1955  MED REC NO:   [id number]                          ROOM:       9209  ACCOUNT NO:   [de-identified]                           ADMIT DATE: 2021  PROVIDER:     Leila Cobos MD    DATE OF PROCEDURE:  2021    This is a spirometry. FEV1 of 1.45, 46% predicted, FVC of 1.93, 45%  predicated, FEV1 to FVC ratio of 75% showing no obstructive lung defect  but consistent with restrictive lung defects. Would recommend a full  PFT to evaluate.         Pop Rodriguez MD    D: 2021 7:50:17       T: 2021 10:21:56     PRISCILLA/ANA [email or web address]  Job#: [id number]     Doc#: [id number]    CC:

## 2021-08-19 ENCOUNTER — APPOINTMENT (OUTPATIENT)
Dept: GENERAL RADIOLOGY | Age: 66
DRG: 236 | End: 2021-08-19
Attending: THORACIC SURGERY (CARDIOTHORACIC VASCULAR SURGERY)
Payer: MEDICARE

## 2021-08-19 LAB
ANION GAP SERPL CALCULATED.3IONS-SCNC: 9 MMOL/L (ref 3–16)
BUN BLDV-MCNC: 9 MG/DL (ref 7–20)
CALCIUM SERPL-MCNC: 8.9 MG/DL (ref 8.3–10.6)
CHLORIDE BLD-SCNC: 97 MMOL/L (ref 99–110)
CO2: 25 MMOL/L (ref 21–32)
CREAT SERPL-MCNC: 0.7 MG/DL (ref 0.8–1.3)
GFR AFRICAN AMERICAN: >60
GFR NON-AFRICAN AMERICAN: >60
GLUCOSE BLD-MCNC: 119 MG/DL (ref 70–99)
GLUCOSE BLD-MCNC: 121 MG/DL (ref 70–99)
GLUCOSE BLD-MCNC: 129 MG/DL (ref 70–99)
GLUCOSE BLD-MCNC: 133 MG/DL (ref 70–99)
GLUCOSE BLD-MCNC: 138 MG/DL (ref 70–99)
HCT VFR BLD CALC: 28.5 % (ref 40.5–52.5)
HEMOGLOBIN: 10.2 G/DL (ref 13.5–17.5)
MAGNESIUM: 2.1 MG/DL (ref 1.8–2.4)
MCH RBC QN AUTO: 35.7 PG (ref 26–34)
MCHC RBC AUTO-ENTMCNC: 35.9 G/DL (ref 31–36)
MCV RBC AUTO: 99.3 FL (ref 80–100)
PDW BLD-RTO: 14.2 % (ref 12.4–15.4)
PERFORMED ON: ABNORMAL
PLATELET # BLD: 143 K/UL (ref 135–450)
PMV BLD AUTO: 7.5 FL (ref 5–10.5)
POTASSIUM SERPL-SCNC: 3.6 MMOL/L (ref 3.5–5.1)
POTASSIUM SERPL-SCNC: 4.4 MMOL/L (ref 3.5–5.1)
RBC # BLD: 2.87 M/UL (ref 4.2–5.9)
SODIUM BLD-SCNC: 131 MMOL/L (ref 136–145)
WBC # BLD: 10.9 K/UL (ref 4–11)

## 2021-08-19 PROCEDURE — 80048 BASIC METABOLIC PNL TOTAL CA: CPT

## 2021-08-19 PROCEDURE — 94669 MECHANICAL CHEST WALL OSCILL: CPT

## 2021-08-19 PROCEDURE — 6360000002 HC RX W HCPCS: Performed by: NURSE PRACTITIONER

## 2021-08-19 PROCEDURE — 94761 N-INVAS EAR/PLS OXIMETRY MLT: CPT

## 2021-08-19 PROCEDURE — 71045 X-RAY EXAM CHEST 1 VIEW: CPT

## 2021-08-19 PROCEDURE — 6370000000 HC RX 637 (ALT 250 FOR IP): Performed by: NURSE PRACTITIONER

## 2021-08-19 PROCEDURE — 6360000002 HC RX W HCPCS: Performed by: THORACIC SURGERY (CARDIOTHORACIC VASCULAR SURGERY)

## 2021-08-19 PROCEDURE — 2580000003 HC RX 258: Performed by: THORACIC SURGERY (CARDIOTHORACIC VASCULAR SURGERY)

## 2021-08-19 PROCEDURE — 85027 COMPLETE CBC AUTOMATED: CPT

## 2021-08-19 PROCEDURE — 83735 ASSAY OF MAGNESIUM: CPT

## 2021-08-19 PROCEDURE — 97530 THERAPEUTIC ACTIVITIES: CPT

## 2021-08-19 PROCEDURE — 99024 POSTOP FOLLOW-UP VISIT: CPT | Performed by: NURSE PRACTITIONER

## 2021-08-19 PROCEDURE — 97116 GAIT TRAINING THERAPY: CPT

## 2021-08-19 PROCEDURE — 2140000000 HC CCU INTERMEDIATE R&B

## 2021-08-19 PROCEDURE — 94640 AIRWAY INHALATION TREATMENT: CPT

## 2021-08-19 PROCEDURE — 2700000000 HC OXYGEN THERAPY PER DAY

## 2021-08-19 PROCEDURE — 84132 ASSAY OF SERUM POTASSIUM: CPT

## 2021-08-19 PROCEDURE — 36592 COLLECT BLOOD FROM PICC: CPT

## 2021-08-19 PROCEDURE — 6370000000 HC RX 637 (ALT 250 FOR IP): Performed by: THORACIC SURGERY (CARDIOTHORACIC VASCULAR SURGERY)

## 2021-08-19 RX ORDER — FONDAPARINUX SODIUM 2.5 MG/.5ML
2.5 INJECTION SUBCUTANEOUS DAILY
Status: DISCONTINUED | OUTPATIENT
Start: 2021-08-19 | End: 2021-08-21 | Stop reason: HOSPADM

## 2021-08-19 RX ORDER — FUROSEMIDE 10 MG/ML
20 INJECTION INTRAMUSCULAR; INTRAVENOUS 3 TIMES DAILY
Status: DISCONTINUED | OUTPATIENT
Start: 2021-08-19 | End: 2021-08-20

## 2021-08-19 RX ADMIN — Medication 10 ML: at 11:25

## 2021-08-19 RX ADMIN — Medication 15 ML: at 10:35

## 2021-08-19 RX ADMIN — CEFAZOLIN 2000 MG: 10 INJECTION, POWDER, FOR SOLUTION INTRAVENOUS at 04:24

## 2021-08-19 RX ADMIN — Medication 10 ML: at 21:18

## 2021-08-19 RX ADMIN — ACETAMINOPHEN 650 MG: 325 TABLET ORAL at 12:48

## 2021-08-19 RX ADMIN — METOPROLOL TARTRATE 37.5 MG: 25 TABLET, FILM COATED ORAL at 22:00

## 2021-08-19 RX ADMIN — ALBUTEROL SULFATE 2.5 MG: 2.5 SOLUTION RESPIRATORY (INHALATION) at 16:25

## 2021-08-19 RX ADMIN — CLOPIDOGREL BISULFATE 75 MG: 75 TABLET ORAL at 10:32

## 2021-08-19 RX ADMIN — MAGNESIUM OXIDE TAB 400 MG (241.3 MG ELEMENTAL MG) 400 MG: 400 (241.3 MG) TAB at 21:20

## 2021-08-19 RX ADMIN — POTASSIUM CHLORIDE 20 MEQ: 29.8 INJECTION, SOLUTION INTRAVENOUS at 06:39

## 2021-08-19 RX ADMIN — MAGNESIUM OXIDE TAB 400 MG (241.3 MG ELEMENTAL MG) 400 MG: 400 (241.3 MG) TAB at 10:33

## 2021-08-19 RX ADMIN — FUROSEMIDE 20 MG: 10 INJECTION, SOLUTION INTRAMUSCULAR; INTRAVENOUS at 12:48

## 2021-08-19 RX ADMIN — Medication 10 ML: at 09:00

## 2021-08-19 RX ADMIN — ATORVASTATIN CALCIUM 40 MG: 40 TABLET, FILM COATED ORAL at 21:13

## 2021-08-19 RX ADMIN — FAMOTIDINE 20 MG: 20 TABLET ORAL at 08:59

## 2021-08-19 RX ADMIN — ACETAMINOPHEN 650 MG: 325 TABLET ORAL at 18:42

## 2021-08-19 RX ADMIN — Medication 10 ML: at 12:49

## 2021-08-19 RX ADMIN — MUPIROCIN: 20 OINTMENT TOPICAL at 21:27

## 2021-08-19 RX ADMIN — INSULIN GLARGINE 13 UNITS: 100 INJECTION, SOLUTION SUBCUTANEOUS at 21:17

## 2021-08-19 RX ADMIN — POLYETHYLENE GLYCOL 3350 17 G: 17 POWDER, FOR SOLUTION ORAL at 09:01

## 2021-08-19 RX ADMIN — POTASSIUM CHLORIDE 10 MEQ: 10 TABLET, EXTENDED RELEASE ORAL at 08:59

## 2021-08-19 RX ADMIN — METOPROLOL TARTRATE 25 MG: 25 TABLET, FILM COATED ORAL at 09:00

## 2021-08-19 RX ADMIN — POTASSIUM CHLORIDE 10 MEQ: 10 TABLET, EXTENDED RELEASE ORAL at 16:54

## 2021-08-19 RX ADMIN — MUPIROCIN: 20 OINTMENT TOPICAL at 10:35

## 2021-08-19 RX ADMIN — POTASSIUM CHLORIDE 10 MEQ: 10 TABLET, EXTENDED RELEASE ORAL at 12:48

## 2021-08-19 RX ADMIN — FUROSEMIDE 20 MG: 10 INJECTION, SOLUTION INTRAMUSCULAR; INTRAVENOUS at 21:12

## 2021-08-19 RX ADMIN — FAMOTIDINE 20 MG: 20 TABLET ORAL at 21:13

## 2021-08-19 RX ADMIN — ALBUTEROL SULFATE 2.5 MG: 2.5 SOLUTION RESPIRATORY (INHALATION) at 19:41

## 2021-08-19 RX ADMIN — POTASSIUM CHLORIDE 20 MEQ: 29.8 INJECTION, SOLUTION INTRAVENOUS at 08:57

## 2021-08-19 RX ADMIN — FONDAPARINUX SODIUM 2.5 MG: 2.5 INJECTION, SOLUTION SUBCUTANEOUS at 11:08

## 2021-08-19 RX ADMIN — Medication 15 ML: at 21:26

## 2021-08-19 RX ADMIN — ASPIRIN 81 MG: 81 TABLET, COATED ORAL at 08:59

## 2021-08-19 ASSESSMENT — PAIN SCALES - GENERAL
PAINLEVEL_OUTOF10: 0
PAINLEVEL_OUTOF10: 2
PAINLEVEL_OUTOF10: 0
PAINLEVEL_OUTOF10: 0
PAINLEVEL_OUTOF10: 2
PAINLEVEL_OUTOF10: 0

## 2021-08-19 NOTE — PLAN OF CARE
Problem: Pain:  Goal: Pain level will decrease  Description: Pain level will decrease  Outcome: Ongoing  Goal: Control of acute pain  Description: Control of acute pain  Outcome: Ongoing  Goal: Control of chronic pain  Description: Control of chronic pain  Outcome: Ongoing     Problem: Discharge Planning:  Goal: Discharged to appropriate level of care  Description: Discharged to appropriate level of care  Outcome: Ongoing     Problem:  Activity Intolerance:  Goal: Able to perform prescribed physical activity  Description: Able to perform prescribed physical activity  Outcome: Ongoing  Goal: Ability to tolerate increased activity will improve  Description: Ability to tolerate increased activity will improve  Outcome: Ongoing     Problem: Anxiety:  Goal: Level of anxiety will decrease  Description: Level of anxiety will decrease  Outcome: Ongoing     Problem: Cardiac Output - Decreased:  Goal: Cardiac output within specified parameters  Description: Cardiac output within specified parameters  Outcome: Ongoing  Goal: Hemodynamic stability will improve  Description: Hemodynamic stability will improve  Outcome: Ongoing     Problem: Fluid Volume - Imbalance:  Goal: Ability to achieve a balanced intake and output will improve  Description: Ability to achieve a balanced intake and output will improve  Outcome: Ongoing  Goal: Chest tube drainage is within specified parameters  Description: Chest tube drainage is within specified parameters  Outcome: Ongoing     Problem: Gas Exchange - Impaired:  Goal: Levels of oxygenation will improve  Description: Levels of oxygenation will improve  Outcome: Ongoing  Goal: Ability to maintain adequate ventilation will improve  Description: Ability to maintain adequate ventilation will improve  Outcome: Ongoing     Problem: Pain:  Goal: Pain level will decrease  Description: Pain level will decrease  Outcome: Ongoing  Goal: Control of acute pain  Description: Control of acute pain  Outcome: Ongoing  Goal: Control of chronic pain  Description: Control of chronic pain  Outcome: Ongoing     Problem: Tissue Perfusion - Cardiopulmonary, Altered:  Goal: Absence of angina  Description: Absence of angina  Outcome: Ongoing  Goal: Hemodynamic stability will improve  Description: Hemodynamic stability will improve  Outcome: Ongoing  Goal: Will show no evidence of cardiac arrhythmias  Description: Will show no evidence of cardiac arrhythmias  Outcome: Ongoing     Problem: Tobacco Use:  Goal: Will participate in inpatient tobacco-use cessation counseling  Description: Will participate in inpatient tobacco-use cessation counseling  Outcome: Ongoing     Problem: Nutrition  Goal: Optimal nutrition therapy  Outcome: Ongoing     Problem: Skin Integrity:  Goal: Will show no infection signs and symptoms  Description: Will show no infection signs and symptoms  Outcome: Ongoing  Goal: Absence of new skin breakdown  Description: Absence of new skin breakdown  Outcome: Ongoing     Problem: Falls - Risk of:  Goal: Will remain free from falls  Description: Will remain free from falls  Outcome: Ongoing  Goal: Absence of physical injury  Description: Absence of physical injury  Outcome: Ongoing

## 2021-08-19 NOTE — PROGRESS NOTES
CVTS Cardiothoracic Progress Note:                                CC:  Post op follow up     Surgery: 8/17 CORONARY ARTERY BYPASS GRAFTING X3 , INTERNAL MAMMARY ARTERY, SAPHENOUS VEIN GRAFT, ON PUMP WITH LEFT ATRIAL APPENDAGE CLIP  PLACEMENT AND PECTORIALIS NERVE BLOCK     Hospital course:   8/18 Up in chair, remains SR. Easily conversational and in NAD.   8/19 No acute events overnight. Remains hemodynamically stable in SR.       Past Medical History:   Diagnosis Date    Blind in both eyes     legally blind - has minimal peripheral vision right eye    CAD (coronary artery disease)     Constipation     Histoplasmosis     Hyperlipidemia     Hypertension     [facility]VD [facility]peripheral vascular disease) ([facility])         Past Surgical History:   Procedure Laterality Date    EYE SURGERY Bilateral     general anesthesia x1     FEMORAL-FEMORAL BYPASS GRAFT Bilateral 6/28/2021    FEMORAL TO FEMORAL BYPASS GRAFT WITH RIGHT LOWER EXTREMITY ANGIOGRAM performed by Mehran Salvador MD at [place] [address] as of 08/13/2021    (No Known Allergies)        Patient Active Problem List   Diagnosis    TIA (transient ischemic attack)    Low back pain    HTN (hypertension)    Tobacco abuse    Other hyperlipidemia    Stroke-like symptom    Claudication of both upper extremities due to atherosclerosis ([address]    Ischemic rest pain of lower extremity    PVD (peripheral vascular disease) ([address]    Cardiomyopathy ([address]    S/P right femoral-femoral bypass surgery 6/28/21 Dr. Bernice Lemos     SOB (shortness of breath) on exertion    3-vessel CAD        Vital Signs: BP 97/61   Pulse 111   Temp 98.1 °F (36.7 °C) (Oral)   Resp 18   Ht 5' 8\" (1.727 m)   Wt 208 lb 9.6 oz (94.6 kg)   SpO2 94%   BMI 31.72 kg/m²  O2 Flow Rate (L/min): 1 L/min     Admission Weight: Weight: 206 lb (93.4 kg)    Weight on 8/17 (89.8 kg) Pre-op   Weight on 8/18 (96.5 kg) +6.7 kg  8/19 94.6 kg     Intake/Output:     Intake/Output Summary (Last 24 hours) at 8/19/2021 0852  Last data filed at 8/19/2021 0400  Gross per 24 hour   Intake 720 ml   Output 2515 ml   Net -1795 ml     Extubation Time: 8/17 at 16:08  Transition Time: 8/18 at 07:40    LABORATORY DATA:     CBC:   Recent Labs     08/17/21  1640 08/18/21  0400 08/19/21  0430   WBC 11.0 9.0 10.9   HGB 10.9* 10.9* 10.2*   HCT 30.8* 31.1* 28.5*   .5* 101.6* 99.3    151 143     BMP:   Recent Labs     08/17/21  1640 08/17/21  1640 08/17/21  2100 08/18/21  0400 08/19/21  0430     --   --  138 131*   K 4.0   < > 3.9 4.0 3.6     --   --  106 97*   CO2 20*  --   --  21 25   BUN 5*  --   --  5* 9   CREATININE 0.7*  --   --  0.7* 0.7*    < > = values in this interval not displayed. MG:    Recent Labs     08/17/21  1640 08/18/21  0400 08/19/21  0430   MG 2.80* 2.20 2.10      CXR: 8/19/21  FINDINGS:   Cardiac leads project over the chest.  Right internal jugular Cordis is seen. Status post median sternotomy.  Interstitial opacity is again seen.  Removal   of left chest tube.  Linear opacity at the left upper lobe, likely   atelectasis.  Persistent dense retrocardiac opacity.  Blunting of the left   costophrenic angle and increased left basilar hazy opacity.  Cardiomegaly. Cardiac and mediastinal silhouettes are similar to prior.  Calcified right   paratracheal lymph node, in keeping with granulomatous disease.           Impression   Small left pleural effusion, increased as compared to prior.  Developing left   upper lobe atelectasis.  Persistent left basilar atelectasis or airspace   disease.  Background pulmonary edema, similar to prior.      ______________________________________________________    Subjective:   Dietary Intake: needs improvement    no Nausea   Pain Control: adequate   Complaints: minimal pain at Right EVH site   Bowels: no BM     Objective:   General appearance: resting comfortably in chair, in nad   Lungs: diminished in bases  Heart: S1S2 normal; ST on monitor,

## 2021-08-19 NOTE — PROGRESS NOTES
Physical Therapy  Facility/Department: [facility] C2 CARD TELEMETRY  Daily Treatment Note  NAME: Daniel Aceves. : 1955  MRN: [id number]    Date of Service: 2021    Discharge Recommendations:  24 hour supervision or assist   PT Equipment Recommendations  Equipment Needed: No    Assessment   Body structures, Functions, Activity limitations: Decreased functional mobility ; Decreased balance;Decreased posture;Decreased endurance  Assessment: Pt tolerated therapy well this date. Progressing well with transfers and gait training. SBA for transfers and SBA/CGA for gait training without device this date. SBA for toilet transfer and toileting this date. Tolerated therapy well. Pt would benefit from continued skilled therapy to address deficits. Continue to recommend home with 24-hr sup at d/c. Treatment Diagnosis: impaired functional mobility  Specific instructions for Next Treatment: progress mobility as tolerated  Prognosis: Good  Decision Making: High Complexity  PT Education: Goals; General Safety;Gait Training;PT Role;Disease Specific Education;Plan of Care; Functional Mobility Training;Transfer Training  Disease Specific Education: Pt educated on sternal precautions, safe mobility, energy conservation, and task modification. Pt verbalized understanding  Barriers to Learning: none  REQUIRES PT FOLLOW UP: Yes  Activity Tolerance  Activity Tolerance: Patient Tolerated treatment well;Patient limited by fatigue;Patient limited by endurance  Activity Tolerance: /66, ; SpO2 >90% on RA     Patient Diagnosis(es): The encounter diagnosis was Preop testing. has a past medical history of Blind in both eyes, CAD (coronary artery disease), Constipation, Histoplasmosis, Hyperlipidemia, Hypertension, and PVD (peripheral vascular disease) ([facility] has a past surgical history that includes eye surgery (Bilateral);  Femoral-femoral Bypass Graft (Bilateral, 2021); vascular surgery; and Coronary artery bypass graft (N/A, 8/17/2021). Restrictions  Restrictions/Precautions  Restrictions/Precautions: General Precautions, Fall Risk  Position Activity Restriction  Sternal Precautions: No Pushing, No Pulling, 5# Lifting Restrictions  Other position/activity restrictions: ambulate, up in chair; pt is legally blind  Subjective   General  Chart Reviewed: Yes  Response To Previous Treatment: Patient with no complaints from previous session. Family / Caregiver Present: No  Referring Practitioner: Dr. Vanessa Gallegos MD  Subjective  Subjective: pt agreeable to therapy  General Comment  Comments: Pt up in chair on approach; RN cleared pt for therapy  Pain Screening  Patient Currently in Pain: Denies       Objective   Bed mobility  Supine to Sit: Stand by assistance (cues for technique)  Sit to Supine: Unable to assess (pt up in chair at start of session)     Transfers  Sit to Stand: Stand by assistance (cues for safe hand placement and to maintain sternal precautions)  Stand to sit: Stand by assistance     Ambulation  Ambulation?: Yes  Ambulation 1  Surface: level tile  Device: No Device  Assistance: Stand by assistance;Contact guard assistance  Quality of Gait: Cues for posture. BLE held in ER with stepping. Good step length with wide CODEY. Cues for arm swing  Gait Deviations: Slow Cyndee; Shuffles;Decreased step length;Decreased step height  Distance: 120 ft                                    AM-PAC Score  AM-PAC Inpatient Mobility Raw Score : 20 (08/19/21 1403)  AM-PAC Inpatient T-Scale Score : 47.67 (08/19/21 1403)  Mobility Inpatient CMS 0-100% Score: 35.83 (08/19/21 1403)  Mobility Inpatient CMS G-Code Modifier : CJ (08/19/21 1403)          Goals  Short term goals  Time Frame for Short term goals: 1 week (8/25) unless otherwise specified  Short term goal 1: Pt will be SBA for bed mobility. - GOAL MET 8/19  Short term goal 2: Pt will be supervision for transfers without device.  -- SBA no device 8/19  Short term goal 3: Pt will

## 2021-08-20 LAB
ANION GAP SERPL CALCULATED.3IONS-SCNC: 9 MMOL/L (ref 3–16)
BUN BLDV-MCNC: 13 MG/DL (ref 7–20)
CALCIUM SERPL-MCNC: 8.8 MG/DL (ref 8.3–10.6)
CHLORIDE BLD-SCNC: 100 MMOL/L (ref 99–110)
CO2: 27 MMOL/L (ref 21–32)
CREAT SERPL-MCNC: 0.8 MG/DL (ref 0.8–1.3)
GFR AFRICAN AMERICAN: >60
GFR NON-AFRICAN AMERICAN: >60
GLUCOSE BLD-MCNC: 112 MG/DL (ref 70–99)
HCT VFR BLD CALC: 28.8 % (ref 40.5–52.5)
HEMOGLOBIN: 10 G/DL (ref 13.5–17.5)
MAGNESIUM: 2.3 MG/DL (ref 1.8–2.4)
MCH RBC QN AUTO: 34.7 PG (ref 26–34)
MCHC RBC AUTO-ENTMCNC: 34.8 G/DL (ref 31–36)
MCV RBC AUTO: 99.6 FL (ref 80–100)
PDW BLD-RTO: 14 % (ref 12.4–15.4)
PLATELET # BLD: 154 K/UL (ref 135–450)
PMV BLD AUTO: 7.2 FL (ref 5–10.5)
POTASSIUM SERPL-SCNC: 4.2 MMOL/L (ref 3.5–5.1)
RBC # BLD: 2.89 M/UL (ref 4.2–5.9)
SODIUM BLD-SCNC: 136 MMOL/L (ref 136–145)
WBC # BLD: 10 K/UL (ref 4–11)

## 2021-08-20 PROCEDURE — 97535 SELF CARE MNGMENT TRAINING: CPT

## 2021-08-20 PROCEDURE — 6370000000 HC RX 637 (ALT 250 FOR IP)

## 2021-08-20 PROCEDURE — 6370000000 HC RX 637 (ALT 250 FOR IP): Performed by: THORACIC SURGERY (CARDIOTHORACIC VASCULAR SURGERY)

## 2021-08-20 PROCEDURE — 94010 BREATHING CAPACITY TEST: CPT | Performed by: INTERNAL MEDICINE

## 2021-08-20 PROCEDURE — 99024 POSTOP FOLLOW-UP VISIT: CPT | Performed by: NURSE PRACTITIONER

## 2021-08-20 PROCEDURE — 85027 COMPLETE CBC AUTOMATED: CPT

## 2021-08-20 PROCEDURE — 97110 THERAPEUTIC EXERCISES: CPT

## 2021-08-20 PROCEDURE — 83735 ASSAY OF MAGNESIUM: CPT

## 2021-08-20 PROCEDURE — 6360000002 HC RX W HCPCS: Performed by: THORACIC SURGERY (CARDIOTHORACIC VASCULAR SURGERY)

## 2021-08-20 PROCEDURE — 6370000000 HC RX 637 (ALT 250 FOR IP): Performed by: NURSE PRACTITIONER

## 2021-08-20 PROCEDURE — 80048 BASIC METABOLIC PNL TOTAL CA: CPT

## 2021-08-20 PROCEDURE — 97116 GAIT TRAINING THERAPY: CPT

## 2021-08-20 PROCEDURE — 97530 THERAPEUTIC ACTIVITIES: CPT

## 2021-08-20 PROCEDURE — 2140000000 HC CCU INTERMEDIATE R&B

## 2021-08-20 PROCEDURE — 94640 AIRWAY INHALATION TREATMENT: CPT

## 2021-08-20 PROCEDURE — 2580000003 HC RX 258: Performed by: THORACIC SURGERY (CARDIOTHORACIC VASCULAR SURGERY)

## 2021-08-20 PROCEDURE — 94669 MECHANICAL CHEST WALL OSCILL: CPT

## 2021-08-20 PROCEDURE — 6360000002 HC RX W HCPCS: Performed by: NURSE PRACTITIONER

## 2021-08-20 RX ORDER — FUROSEMIDE 40 MG/1
40 TABLET ORAL DAILY
Status: DISCONTINUED | OUTPATIENT
Start: 2021-08-20 | End: 2021-08-21 | Stop reason: HOSPADM

## 2021-08-20 RX ORDER — ACETAMINOPHEN 325 MG/1
TABLET ORAL
Status: COMPLETED
Start: 2021-08-20 | End: 2021-08-20

## 2021-08-20 RX ORDER — POTASSIUM CHLORIDE 750 MG/1
10 TABLET, EXTENDED RELEASE ORAL ONCE
Status: DISCONTINUED | OUTPATIENT
Start: 2021-08-20 | End: 2021-08-20

## 2021-08-20 RX ORDER — ACETAMINOPHEN 325 MG/1
650 TABLET ORAL EVERY 4 HOURS PRN
Status: DISCONTINUED | OUTPATIENT
Start: 2021-08-20 | End: 2021-08-21 | Stop reason: HOSPADM

## 2021-08-20 RX ORDER — METOPROLOL TARTRATE 50 MG/1
50 TABLET, FILM COATED ORAL 2 TIMES DAILY
Status: DISCONTINUED | OUTPATIENT
Start: 2021-08-20 | End: 2021-08-21 | Stop reason: HOSPADM

## 2021-08-20 RX ORDER — POTASSIUM CHLORIDE 20 MEQ/1
20 TABLET, EXTENDED RELEASE ORAL DAILY
Status: DISCONTINUED | OUTPATIENT
Start: 2021-08-21 | End: 2021-08-21 | Stop reason: HOSPADM

## 2021-08-20 RX ADMIN — CLOPIDOGREL BISULFATE 75 MG: 75 TABLET ORAL at 08:47

## 2021-08-20 RX ADMIN — FONDAPARINUX SODIUM 2.5 MG: 2.5 INJECTION, SOLUTION SUBCUTANEOUS at 08:48

## 2021-08-20 RX ADMIN — Medication 15 ML: at 08:49

## 2021-08-20 RX ADMIN — METOPROLOL TARTRATE 50 MG: 50 TABLET, FILM COATED ORAL at 20:18

## 2021-08-20 RX ADMIN — ALBUTEROL SULFATE 2.5 MG: 2.5 SOLUTION RESPIRATORY (INHALATION) at 15:54

## 2021-08-20 RX ADMIN — FUROSEMIDE 40 MG: 40 TABLET ORAL at 08:47

## 2021-08-20 RX ADMIN — ACETAMINOPHEN 650 MG: 325 TABLET ORAL at 20:18

## 2021-08-20 RX ADMIN — FAMOTIDINE 20 MG: 20 TABLET ORAL at 08:47

## 2021-08-20 RX ADMIN — MUPIROCIN: 20 OINTMENT TOPICAL at 08:49

## 2021-08-20 RX ADMIN — ASPIRIN 81 MG: 81 TABLET, COATED ORAL at 08:47

## 2021-08-20 RX ADMIN — ALBUTEROL SULFATE 2.5 MG: 2.5 SOLUTION RESPIRATORY (INHALATION) at 11:44

## 2021-08-20 RX ADMIN — FAMOTIDINE 20 MG: 20 TABLET ORAL at 20:18

## 2021-08-20 RX ADMIN — ALBUTEROL SULFATE 2.5 MG: 2.5 SOLUTION RESPIRATORY (INHALATION) at 20:25

## 2021-08-20 RX ADMIN — POTASSIUM CHLORIDE 10 MEQ: 10 TABLET, EXTENDED RELEASE ORAL at 08:47

## 2021-08-20 RX ADMIN — METOPROLOL TARTRATE 50 MG: 50 TABLET, FILM COATED ORAL at 08:49

## 2021-08-20 RX ADMIN — ATORVASTATIN CALCIUM 40 MG: 40 TABLET, FILM COATED ORAL at 20:18

## 2021-08-20 RX ADMIN — POTASSIUM CHLORIDE 20 MEQ: 29.8 INJECTION, SOLUTION INTRAVENOUS at 06:55

## 2021-08-20 RX ADMIN — ACETAMINOPHEN 650 MG: 325 TABLET ORAL at 10:29

## 2021-08-20 RX ADMIN — MUPIROCIN: 20 OINTMENT TOPICAL at 20:17

## 2021-08-20 RX ADMIN — Medication 10 ML: at 20:18

## 2021-08-20 RX ADMIN — Medication 15 ML: at 20:17

## 2021-08-20 RX ADMIN — Medication 10 ML: at 08:49

## 2021-08-20 RX ADMIN — ALBUTEROL SULFATE 2.5 MG: 2.5 SOLUTION RESPIRATORY (INHALATION) at 07:45

## 2021-08-20 ASSESSMENT — PAIN SCALES - GENERAL
PAINLEVEL_OUTOF10: 0
PAINLEVEL_OUTOF10: 3
PAINLEVEL_OUTOF10: 5
PAINLEVEL_OUTOF10: 0
PAINLEVEL_OUTOF10: 3

## 2021-08-20 NOTE — DISCHARGE INSTR - COC
Continuity of Care Form    Patient Name: Nikki Bedoya    :  1955  MRN:  [id number]    Admit date:  2021  Discharge date:  ***    Code Status Order: Full Code   Advance Directives:   Advance Care Flowsheet Documentation       Date/Time Healthcare Directive Type of Healthcare Directive Copy in [address] Agent's Name Healthcare Agent's Phone Number    21 1139  No, patient does not have an advance directive for healthcare treatment    Belva Najjar    21 1144  No, patient does not have an advance directive for healthcare treatment  Lalo Brown 157            Admitting Physician:  Francina Krabbe, MD  PCP: JANET Ramirez CNP    Discharging Nurse: [facility] Unit/Room#: 2017/8446-77  Discharging Unit Phone Number: ***    Emergency Contact:   Extended Emergency Contact Information  Primary Emergency Contact: Brooklynn Stevenson  Address: [address] [place] [address]  Home Phone: [phone]  Relation: Spouse  Secondary Emergency Contact: Stefany Larsen  Home Phone: [phone]  Relation: Child    Past Surgical History:  Past Surgical History:   Procedure Laterality Date    CORONARY ARTERY BYPASS GRAFT N/A 2021    CORONARY ARTERY BYPASS GRAFTING X3 , INTERNAL MAMMARY ARTERY, SAPHENOUS VEIN GRAFT, ON PUMP WITH LEFT ATRIAL APPENDAGE CLIP  PLACEMENT AND PECTORIALIS NERVE BLOCK performed by Francina Krabbe, MD at [address] [place] [address] Bilateral     general anesthesia x1     FEMORAL-FEMORAL BYPASS GRAFT Bilateral 2021    FEMORAL TO FEMORAL BYPASS GRAFT WITH RIGHT LOWER EXTREMITY ANGIOGRAM performed by Merly Melendrez MD at [facility]         Immunization History:   Immunization History   Administered Date(s) Administered    COVID-19, Moderna, PF, 100mcg/0.5mL 2021, 04/10/2021    Tdap (Boostrix, Adacel) 2011       Active Problems:  Patient Active Problem List   Diagnosis Code    TIA (transient ischemic attack) G45.9    Low back pain M54.5    HTN (hypertension) I10    Tobacco abuse Z72.0    Other hyperlipidemia E78.49    Stroke-like symptom R29.90    Claudication of both upper extremities due to atherosclerosis ([facility]) I70.218    Ischemic rest pain of lower extremity M79.606, I99.8    PVD (peripheral vascular disease) ([facility]) I73.9    Cardiomyopathy ([facility]) I42.9    S/P right femoral-femoral bypass surgery 6/28/21 Dr. Dusty Lawson  Z95.828    SOB (shortness of breath) on exertion R06.02    3-vessel CAD I25.10       Isolation/Infection:   Isolation            No Isolation          Patient Infection Status       None to display            Nurse Assessment:  Last Vital Signs: /65   Pulse 105   Temp 98.2 °F (36.8 °C) (Oral)   Resp 16   Ht 5' 8\" (1.727 m)   Wt 203 lb 14.4 oz (92.5 kg)   SpO2 94%   BMI 31.00 kg/m²     Last documented pain score (0-10 scale): Pain Level: 0  Last Weight:   Wt Readings from Last 1 Encounters:   08/20/21 203 lb 14.4 oz (92.5 kg)     Mental Status:  {IP PT MENTAL STATUS:20030}    IV Access:  { NATHAN IV ACCESS:397669445}    Nursing Mobility/ADLs:  Walking   {CHP DME ADDX:526887250}  Transfer  {CHP DME FIRX:289705650}  Bathing  {CHP DME QBNH:107192569}  Dressing  {CHP DME JJHB:166095719}  Toileting  {CHP DME HCCR:883281041}  Feeding  {CHP DME GFJA:809824862}  Med Admin  {P DME BDYW:887518251}  Med Delivery   { NATHAN MED Delivery:957888214}    Wound Care Documentation and Therapy:        Elimination:  Continence:   · Bowel: {YES / YH:31478}  · Bladder: {YES / SA:87352}  Urinary Catheter: {Urinary Catheter:772372404}   Colostomy/Ileostomy/Ileal Conduit: {YES / RH:48842}       Date of Last BM: ***    Intake/Output Summary (Last 24 hours) at 8/20/2021 0941  Last data filed at 8/20/2021 0400  Gross per 24 hour   Intake 370 ml   Output 2225 ml   Net -1855 ml     I/O last 3 completed shifts:   In: 46 [P.O.:360; I.V.:30]  Out: 2225 [Urine:2225]    Safety Concerns: [address] [facility] Safety Concerns:684983629}    Impairments/Disabilities:      [address] [facility] Impairments/Disabilities:752943389}    Nutrition Therapy:  Current Nutrition Therapy:   [address] [facility] Diet List:442049993}    Routes of Feeding: {CHP DME Other Feedings:921395603}  Liquids: {Slp liquid thickness:58381}  Daily Fluid Restriction: {CHP DME Yes amt example:542539937}  Last Modified Barium Swallow with Video (Video Swallowing Test): {Done Not Done FBDQ:236921311}    Treatments at the Time of Hospital Discharge:   Respiratory Treatments: ***  Oxygen Therapy:  {Therapy; copd oxygen:13325}  Ventilator:    {[facility] Vent AERY:909575389}    Rehab Therapies: {THERAPEUTIC INTERVENTION:5154016426}  Weight Bearing Status/Restrictions: [facility] [address] Wallace  Weight Bearin}  Other Medical Equipment (for information only, NOT a DME order):  {EQUIPMENT:688946270}  Other Treatments: ***    Patient's personal belongings (please select all that are sent with patient):  {P DME Belongings:305873381}    RN SIGNATURE:  {Esignature:412284945}    CASE MANAGEMENT/SOCIAL WORK SECTION    Inpatient Status Date: ***    Readmission Risk Assessment Score:  Readmission Risk              Risk of Unplanned Readmission:  16           Discharging to  Agency   · Name: [facility]  · Address:  · Phone:[phone]  · Fax: [phone]     / signature: Electronically signed by Erma Carmona RN on 21 at 10:24 AM EDT    PHYSICIAN SECTION    Prognosis: {Prognosis:6498132617}    Condition at Discharge: [address] Patient Condition:871370710}    Rehab Potential (if transferring to Rehab): {Prognosis:1522719137}    Recommended Labs or Other Treatments After Discharge: ***    Physician Certification: I certify the above information and transfer of Conor Castillo.  is necessary for the continuing treatment of the diagnosis listed and that he requires {Admit to Appropriate Level of Care:09371} for {GREATER/LESS:225513972} 30 days.      Update Admission H&P: {CHP DME Changes in URBTX:552174411}    PHYSICIAN SIGNATURE:  Electronically signed by Guero Galeano MD on 8/21/21 at 9:36 AM EDT

## 2021-08-20 NOTE — PROGRESS NOTES
Physical Therapy  Facility/Department: [facility] C2 CARD TELEMETRY  Daily Treatment Note and Discharge Summary  NAME: Shruthi Altamirano. : 1955  MRN: [id number]    Date of Service: 2021    Discharge Recommendations:  24 hour supervision or assist   PT Equipment Recommendations  Equipment Needed: No    Assessment   Body structures, Functions, Activity limitations: Decreased functional mobility ; Decreased balance;Decreased posture;Decreased endurance  Assessment: Pt tolerated therapy well this date. Intermittent cues for posture and navigation d/t pt blind. SBA to negotiate stairs and supervision for transfers and gait without device. Pt declines concerns about mobility at home. Will d/c acute PT d/t goals met. Treatment Diagnosis: impaired functional mobility  Prognosis: Good  Decision Making: High Complexity  PT Education: Goals; General Safety;Gait Training;PT Role;Disease Specific Education;Plan of Care; Functional Mobility Training;Transfer Training  Disease Specific Education: Pt educated on sternal precautions, safe mobility, energy conservation, and task modification. Pt verbalized understanding  Barriers to Learning: none  No Skilled PT: Safe to return home  REQUIRES PT FOLLOW UP: No  Activity Tolerance  Activity Tolerance: Patient Tolerated treatment well;Patient limited by fatigue;Patient limited by endurance  Activity Tolerance: BP 99/63, SPO2 94% post gait on RA,      Patient Diagnosis(es): The encounter diagnosis was Preop testing. has a past medical history of Blind in both eyes, CAD (coronary artery disease), Constipation, Histoplasmosis, Hyperlipidemia, Hypertension, and PVD (peripheral vascular disease) ([facility] [address] has a past surgical history that includes eye surgery (Bilateral); Femoral-femoral Bypass Graft (Bilateral, 2021); vascular surgery; and Coronary artery bypass graft (N/A, 2021).     Restrictions  Restrictions/Precautions  Restrictions/Precautions: General Precautions, Fall Risk  Position Activity Restriction  Sternal Precautions: No Pushing, No Pulling, 5# Lifting Restrictions  Other position/activity restrictions: ambulate, up in chair; pt is legally blind  Subjective   General  Chart Reviewed: Yes  Response To Previous Treatment: Patient with no complaints from previous session. Family / Caregiver Present: No  Referring Practitioner: Dr. Tray Hernandez MD  Subjective  Subjective: pt agreeable to therapy  General Comment  Comments: Pt up in chair on approach; RN cleared pt for therapy  Pain Screening  Patient Currently in Pain: Denies       Objective   Bed mobility  Supine to Sit: Unable to assess  Sit to Supine: Unable to assess  Comment: pt up in chair at start and end of session. Declines concerns about bed mobility     Transfers  Sit to Stand: Supervision  Stand to sit: Supervision     Ambulation  Ambulation?: Yes  Ambulation 1  Surface: level tile  Device: No Device  Assistance: Supervision  Quality of Gait: Cues for posture. BLE held in ER with stepping. No LOB  Gait Deviations: Slow Cyndee; Shuffles;Decreased step length;Decreased step height  Distance: 130 ft x 2  Stairs/Curb  Stairs?: Yes  Stairs  # Steps : 4  Stairs Height: 6\"  Rails: Bilateral  Device: No Device  Assistance: Stand by assistance  Comment: Cues for sequencing and use of rails while maintaining sternal precautions        Exercises  Hip Flexion: Seated marches x 15 BLE  Hip Abduction: Seated clamshell x 15 BLE  Knee Long Arc Quad: x 15 BLE  Ankle Pumps: x 15 BLE  Comments: Cues for technique                             AM-PAC Score  AM-PAC Inpatient Mobility Raw Score : 23 (08/20/21 1006)  AM-PAC Inpatient T-Scale Score : 56.93 (08/20/21 1006)  Mobility Inpatient CMS 0-100% Score: 11.2 (08/20/21 1006)  Mobility Inpatient CMS G-Code Modifier : CI (08/20/21 1006)          Goals  Short term goals  Time Frame for Short term goals: 1 week (8/25) unless otherwise specified  Short term goal 1: Pt will be SBA for bed mobility. - GOAL MET 8/19  Short term goal 2: Pt will be supervision for transfers without device. -- GOAL MET 8/20  Short term goal 3: Pt will ambulate 100 ft without device and supervision. GOAL MET 8/20  Short term goal 4: Pt will negotiate 3 stairs with rails and SBA. -- GOAL MET 8/20  Short term goal 5: 8/21: Pt will participate in 12-15 reps of BLE exercises to promote strength and activity tolerance. -- GOAL MET 8/20  Patient Goals   Patient goals : \"to go home\"    Plan    Plan  Times per week: d/c acute PT  Times per day: Daily  Specific instructions for Next Treatment: progress mobility as tolerated  Current Treatment Recommendations: Strengthening, Neuromuscular Re-education, Safety Education & Training, Balance Training, Endurance Training, Functional Mobility Training, Transfer Training, Gait Training, Equipment Evaluation, Education, & procurement, Patient/Caregiver Education & Training, Stair training, Home Exercise Program  Safety Devices  Type of devices:  All fall risk precautions in place, Call light within reach, Gait belt, Patient at risk for falls (pt left seated in chair with OT)     Therapy Time   Individual Concurrent Group Co-treatment   Time In 0931         Time Out 0954         Minutes 23         Timed Code Treatment Minutes: Sherif PT, DPT

## 2021-08-20 NOTE — PROGRESS NOTES
Occupational Therapy  Facility/Department: [facility] C2 CARD TELEMETRY  Daily Treatment Note  NAME: Daniel Nguyen. : 1955  MRN: [id number]    Date of Service: 2021    Discharge Recommendations:  Home with Home health OT, 24 hour supervision or assist     Assessment   Performance deficits / Impairments: Decreased functional mobility ; Decreased ADL status; Decreased strength;Decreased endurance;Decreased balance  Assessment: Pt tolerated therapy well this date with increased verbal cuing for navigation d/t pt blind. supervision to [facility] for safety during fx transfers and mobility. completed grooming at sink with SBA. required mod A for LB dressing in chair. verbalized good understanding of pre-cautions. benefits from OT for improved participation in ADLs. recommend d/c to home with [place]. Prognosis: Good  OT Education: OT Role;Plan of Care;Precautions; ADL Adaptive Strategies;Transfer Training;Equipment  Patient Education: Disease specific: education on sternal precautions and ADL modified techniques, education on bed mobility techniques and energy conservation. REQUIRES OT FOLLOW UP: Yes  Activity Tolerance  Activity Tolerance: Patient Tolerated treatment well  Activity Tolerance: Vitals: 104 HR, 101/73 BP, 91% O2 on RA  Safety Devices  Type of devices: Gait belt;Left in bed;Nurse notified;Call light within reach       Patient Diagnosis(es): The encounter diagnosis was Preop testing. has a past medical history of Blind in both eyes, CAD (coronary artery disease), Constipation, Histoplasmosis, Hyperlipidemia, Hypertension, and PVD (peripheral vascular disease) ([address] has a past surgical history that includes eye surgery (Bilateral); Femoral-femoral Bypass Graft (Bilateral, 2021); vascular surgery; and Coronary artery bypass graft (N/A, 2021).     Restrictions  Restrictions/Precautions  Restrictions/Precautions: General Precautions, Fall Risk  Position Activity Restriction  Sternal Precautions: No Pushing, No Pulling, 5# Lifting Restrictions  Other position/activity restrictions: ambulate, up in chair; pt is legally blind  Subjective   General  Chart Reviewed: Yes  Patient assessed for rehabilitation services?: Yes  Family / Caregiver Present: No  Referring Practitioner: Kee Mcclure MD 8/17/21  Diagnosis: CABG x 3 8/17/2021  Subjective  Subjective: pt pleasant and willing to participate; hopes to go home soon. R peripheal vision only. Vital Signs  Patient Currently in Pain: Denies      Objective    ADL  Grooming: Stand by assistance (for standing balance at sink, completed oral hygiene and face washing at sink.)  UE Dressing: Minimal assistance (seated in chair)  LE Dressing: Moderate assistance;Maximum assistance (mod A for donning pants, max A donning socks and shoes)        Balance  Sitting Balance: Supervision  Standing Balance: Stand by assistance (CGA and SBA)  Functional Mobility  Functional - Mobility Device: No device  Activity: To/from bathroom (30ft into hallway)  Assist Level: Contact guard assistance  Bed mobility  Sit to Supine:  Moderate assistance  Comment: pt required mod A for bed mobility sit to supine while maintaing sternal precautions  Transfers  Stand Step Transfers: SBA w/ 4ww)  Sit to stand: SBA (up to 4ww with cues for sternal precautions)  Stand to sit: SBA        Plan   Plan  Times per week: 4-6x's a week while in icu  Current Treatment Recommendations: Self-Care / ADL, Safety Education & Training  AM-PAC Score        AM-PAC Inpatient Daily Activity Raw Score: 15 (08/20/21 1207)  AM-PAC Inpatient ADL T-Scale Score : 34.69 (08/20/21 1207)  ADL Inpatient CMS 0-100% Score: 56.46 (08/20/21 1207)  ADL Inpatient CMS G-Code Modifier : CK (08/20/21 1207)    Goals  Short term goals  Time Frame for Short term goals: 1 week unless otherwise specified 8/25  Short term goal 1: Pt will complete LE dressing with [place]; ongoing mod-max A donning LE clothing  Short term goal 2: Pt will complete toilet transfers with CGA by 8/23; ongoing 8/20, completed all other transfers with CGA  Short term goal 3: Pt will complete standing level ADLs with SBA for balance; met 8/17 ~6-7 minutes at sink  Patient Goals   Patient goals : \"to go home\"       Therapy Time   Individual Concurrent Group Co-treatment   Time In [address] [place] [address]         Time Out 1028         Minutes Flora OROZCO [address], OT   If pt is unable to be seen after this session, please let this note serve as discharge summary. Please see case management note for discharge disposition. Thank you.

## 2021-08-20 NOTE — PLAN OF CARE
Problem: Pain:  Goal: Pain level will decrease  Description: Pain level will decrease  Outcome: Ongoing  Goal: Control of acute pain  Description: Control of acute pain  Outcome: Ongoing     Problem:  Activity Intolerance:  Goal: Able to perform prescribed physical activity  Description: Able to perform prescribed physical activity  Outcome: Ongoing     Problem: Anxiety:  Goal: Level of anxiety will decrease  Description: Level of anxiety will decrease  Outcome: Ongoing     Problem: Pain:  Goal: Pain level will decrease  Description: Pain level will decrease  Outcome: Ongoing     Problem: Nutrition  Goal: Optimal nutrition therapy  Outcome: Ongoing     Problem: Skin Integrity:  Goal: Will show no infection signs and symptoms  Description: Will show no infection signs and symptoms  Outcome: Ongoing  Goal: Absence of new skin breakdown  Description: Absence of new skin breakdown  Outcome: Ongoing     Problem: Falls - Risk of:  Goal: Will remain free from falls  Description: Will remain free from falls  Outcome: Ongoing

## 2021-08-21 VITALS
DIASTOLIC BLOOD PRESSURE: 59 MMHG | HEIGHT: 68 IN | TEMPERATURE: 98.3 F | HEART RATE: 96 BPM | SYSTOLIC BLOOD PRESSURE: 115 MMHG | RESPIRATION RATE: 20 BRPM | WEIGHT: 205 LBS | OXYGEN SATURATION: 96 % | BODY MASS INDEX: 31.07 KG/M2

## 2021-08-21 LAB
ANION GAP SERPL CALCULATED.3IONS-SCNC: 11 MMOL/L (ref 3–16)
BLOOD BANK DISPENSE STATUS: NORMAL
BLOOD BANK DISPENSE STATUS: NORMAL
BLOOD BANK PRODUCT CODE: NORMAL
BLOOD BANK PRODUCT CODE: NORMAL
BPU ID: NORMAL
BPU ID: NORMAL
BUN BLDV-MCNC: 18 MG/DL (ref 7–20)
CALCIUM SERPL-MCNC: 9.1 MG/DL (ref 8.3–10.6)
CHLORIDE BLD-SCNC: 100 MMOL/L (ref 99–110)
CO2: 28 MMOL/L (ref 21–32)
CREAT SERPL-MCNC: 0.8 MG/DL (ref 0.8–1.3)
DESCRIPTION BLOOD BANK: NORMAL
DESCRIPTION BLOOD BANK: NORMAL
GFR AFRICAN AMERICAN: >60
GFR NON-AFRICAN AMERICAN: >60
GLUCOSE BLD-MCNC: 126 MG/DL (ref 70–99)
HCT VFR BLD CALC: 30.9 % (ref 40.5–52.5)
HEMOGLOBIN: 11 G/DL (ref 13.5–17.5)
MAGNESIUM: 2.5 MG/DL (ref 1.8–2.4)
MCH RBC QN AUTO: 35.7 PG (ref 26–34)
MCHC RBC AUTO-ENTMCNC: 35.5 G/DL (ref 31–36)
MCV RBC AUTO: 100.7 FL (ref 80–100)
PDW BLD-RTO: 14 % (ref 12.4–15.4)
PLATELET # BLD: 210 K/UL (ref 135–450)
PMV BLD AUTO: 6.8 FL (ref 5–10.5)
POTASSIUM SERPL-SCNC: 4.2 MMOL/L (ref 3.5–5.1)
RBC # BLD: 3.07 M/UL (ref 4.2–5.9)
SODIUM BLD-SCNC: 139 MMOL/L (ref 136–145)
WBC # BLD: 8.4 K/UL (ref 4–11)

## 2021-08-21 PROCEDURE — 6360000002 HC RX W HCPCS: Performed by: THORACIC SURGERY (CARDIOTHORACIC VASCULAR SURGERY)

## 2021-08-21 PROCEDURE — 94669 MECHANICAL CHEST WALL OSCILL: CPT

## 2021-08-21 PROCEDURE — 6370000000 HC RX 637 (ALT 250 FOR IP): Performed by: THORACIC SURGERY (CARDIOTHORACIC VASCULAR SURGERY)

## 2021-08-21 PROCEDURE — 80048 BASIC METABOLIC PNL TOTAL CA: CPT

## 2021-08-21 PROCEDURE — 6370000000 HC RX 637 (ALT 250 FOR IP): Performed by: NURSE PRACTITIONER

## 2021-08-21 PROCEDURE — 6360000002 HC RX W HCPCS: Performed by: NURSE PRACTITIONER

## 2021-08-21 PROCEDURE — 94640 AIRWAY INHALATION TREATMENT: CPT

## 2021-08-21 PROCEDURE — 83735 ASSAY OF MAGNESIUM: CPT

## 2021-08-21 PROCEDURE — 85027 COMPLETE CBC AUTOMATED: CPT

## 2021-08-21 PROCEDURE — 2580000003 HC RX 258: Performed by: THORACIC SURGERY (CARDIOTHORACIC VASCULAR SURGERY)

## 2021-08-21 PROCEDURE — 99024 POSTOP FOLLOW-UP VISIT: CPT | Performed by: THORACIC SURGERY (CARDIOTHORACIC VASCULAR SURGERY)

## 2021-08-21 RX ORDER — POTASSIUM CHLORIDE 20 MEQ/1
20 TABLET, EXTENDED RELEASE ORAL DAILY
Qty: 7 TABLET | Refills: 0 | Status: SHIPPED | OUTPATIENT
Start: 2021-08-21 | End: 2021-08-30 | Stop reason: ALTCHOICE

## 2021-08-21 RX ORDER — METOPROLOL TARTRATE 50 MG/1
50 TABLET, FILM COATED ORAL 2 TIMES DAILY
Qty: 60 TABLET | Refills: 0 | Status: SHIPPED | OUTPATIENT
Start: 2021-08-21 | End: 2021-09-28 | Stop reason: SDUPTHER

## 2021-08-21 RX ORDER — OXYCODONE HYDROCHLORIDE 5 MG/1
5 TABLET ORAL EVERY 6 HOURS PRN
Qty: 28 TABLET | Refills: 0 | Status: SHIPPED | OUTPATIENT
Start: 2021-08-21 | End: 2021-08-28

## 2021-08-21 RX ORDER — FUROSEMIDE 40 MG/1
40 TABLET ORAL DAILY
Qty: 7 TABLET | Refills: 0 | Status: SHIPPED | OUTPATIENT
Start: 2021-08-21 | End: 2021-08-30 | Stop reason: ALTCHOICE

## 2021-08-21 RX ORDER — CLOPIDOGREL BISULFATE 75 MG/1
75 TABLET ORAL DAILY
Qty: 30 TABLET | Refills: 0 | Status: SHIPPED | OUTPATIENT
Start: 2021-08-21 | End: 2021-09-28 | Stop reason: SDUPTHER

## 2021-08-21 RX ORDER — FAMOTIDINE 20 MG/1
20 TABLET, FILM COATED ORAL DAILY
Qty: 30 TABLET | Refills: 0 | Status: SHIPPED | OUTPATIENT
Start: 2021-08-21 | End: 2021-09-20

## 2021-08-21 RX ORDER — POLYETHYLENE GLYCOL 3350 17 G/17G
17 POWDER, FOR SOLUTION ORAL DAILY
Qty: 7 EACH | Refills: 0 | Status: SHIPPED | OUTPATIENT
Start: 2021-08-21 | End: 2021-08-28

## 2021-08-21 RX ADMIN — FONDAPARINUX SODIUM 2.5 MG: 2.5 INJECTION, SOLUTION SUBCUTANEOUS at 08:51

## 2021-08-21 RX ADMIN — POTASSIUM CHLORIDE 20 MEQ: 29.8 INJECTION, SOLUTION INTRAVENOUS at 06:05

## 2021-08-21 RX ADMIN — ASPIRIN 81 MG: 81 TABLET, COATED ORAL at 08:48

## 2021-08-21 RX ADMIN — POTASSIUM CHLORIDE 20 MEQ: 20 TABLET, EXTENDED RELEASE ORAL at 08:49

## 2021-08-21 RX ADMIN — FAMOTIDINE 20 MG: 20 TABLET ORAL at 08:50

## 2021-08-21 RX ADMIN — CLOPIDOGREL BISULFATE 75 MG: 75 TABLET ORAL at 08:49

## 2021-08-21 RX ADMIN — ALBUTEROL SULFATE 2.5 MG: 2.5 SOLUTION RESPIRATORY (INHALATION) at 08:24

## 2021-08-21 RX ADMIN — Medication 10 ML: at 08:53

## 2021-08-21 RX ADMIN — Medication 15 ML: at 08:51

## 2021-08-21 RX ADMIN — MUPIROCIN: 20 OINTMENT TOPICAL at 08:53

## 2021-08-21 RX ADMIN — METOPROLOL TARTRATE 50 MG: 50 TABLET, FILM COATED ORAL at 08:49

## 2021-08-21 RX ADMIN — FUROSEMIDE 40 MG: 40 TABLET ORAL at 08:49

## 2021-08-21 RX ADMIN — ALBUTEROL SULFATE 2.5 MG: 2.5 SOLUTION RESPIRATORY (INHALATION) at 12:20

## 2021-08-21 ASSESSMENT — PAIN SCALES - GENERAL: PAINLEVEL_OUTOF10: 0

## 2021-08-21 NOTE — PROGRESS NOTES
CVTS Cardiothoracic Progress Note:                                CC:  Post op follow up     Surgery: 8/17 CORONARY ARTERY BYPASS GRAFTING X3 , INTERNAL MAMMARY ARTERY, SAPHENOUS VEIN GRAFT, ON PUMP WITH LEFT ATRIAL APPENDAGE CLIP  PLACEMENT AND PECTORIALIS NERVE BLOCK     Hospital course:   8/18 Up in chair, remains SR. Easily conversational and in NAD.   8/19 No acute events overnight. Remains hemodynamically stable in SR.  8/20 Continues to progress well. No acute events overnight. Remains stable in SR. Minimal post op pain.    8/21 progress well no major complaint overnight      Past Medical History:   Diagnosis Date    Blind in both eyes     legally blind - has minimal peripheral vision right eye    CAD (coronary artery disease)     Constipation     Histoplasmosis     Hyperlipidemia     Hypertension     PVD (peripheral vascular disease) ([facility])         Past Surgical History:   Procedure Laterality Date    CORONARY ARTERY BYPASS GRAFT N/A 8/17/2021    CORONARY ARTERY BYPASS GRAFTING X3 , INTERNAL MAMMARY ARTERY, SAPHENOUS VEIN GRAFT, ON PUMP WITH LEFT ATRIAL APPENDAGE CLIP  PLACEMENT AND PECTORIALIS NERVE BLOCK performed by Noah Larose MD at [address] [place] [address] Bilateral     general anesthesia x1     FEMORAL-FEMORAL BYPASS GRAFT Bilateral 6/28/2021    FEMORAL TO FEMORAL BYPASS GRAFT WITH RIGHT LOWER EXTREMITY ANGIOGRAM performed by Javier Melendrez MD at [place] [address] as of 08/13/2021    (No Known Allergies)        Patient Active Problem List   Diagnosis    TIA (transient ischemic attack)    Low back pain    HTN (hypertension)    Tobacco abuse    Other hyperlipidemia    Stroke-like symptom    Claudication of both upper extremities due to atherosclerosis ([address]    Ischemic rest pain of lower extremity    PVD (peripheral vascular disease) ([address]    Cardiomyopathy ([address]    S/P right femoral-femoral bypass surgery 6/28/21 Dr. Bebo Ortega     SOB (shortness of breath) on exertion    3-vessel CAD        Vital Signs: /64   Pulse 93   Temp 98.3 °F (36.8 °C) (Oral)   Resp 16   Ht 5' 8\" (1.727 m)   Wt 205 lb (93 kg)   SpO2 95%   BMI 31.17 kg/m²  O2 Flow Rate (L/min): 1 L/min     Admission Weight: Weight: 206 lb (93.4 kg)    Weight on 8/17 (89.8 kg) Pre-op   Weight on 8/18 (96.5 kg) +6.7 kg  8/19 94.6 kg   8/20 92.5 kg     Intake/Output:     Intake/Output Summary (Last 24 hours) at 8/21/2021 0859  Last data filed at 8/21/2021 0700  Gross per 24 hour   Intake 894 ml   Output 901 ml   Net -7 ml     Extubation Time: 8/17 at 16:08  Transition Time: 8/18 at 07:40    LABORATORY DATA:     CBC:   Recent Labs     08/19/21 0430 08/20/21  0350 08/21/21  0456   WBC 10.9 10.0 8.4   HGB 10.2* 10.0* 11.0*   HCT 28.5* 28.8* 30.9*   MCV 99.3 99.6 100.7*    154 210     BMP:   Recent Labs     08/19/21  0430 08/19/21  0430 08/19/21  1122 08/20/21  0350 08/21/21  0456   *  --   --  136 139   K 3.6   < > 4.4 4.2 4.2   CL 97*  --   --  100 100   CO2 25  --   --  27 28   BUN 9  --   --  13 18   CREATININE 0.7*  --   --  0.8 0.8    < > = values in this interval not displayed. MG:    Recent Labs     08/19/21 0430 08/20/21  0350 08/21/21  0456   MG 2.10 2.30 2.50*      CXR: 8/19/21  FINDINGS:   Cardiac leads project over the chest.  Right internal jugular Cordis is seen. Status post median sternotomy.  Interstitial opacity is again seen.  Removal   of left chest tube.  Linear opacity at the left upper lobe, likely   atelectasis.  Persistent dense retrocardiac opacity.  Blunting of the left   costophrenic angle and increased left basilar hazy opacity.  Cardiomegaly.    Cardiac and mediastinal silhouettes are similar to prior.  Calcified right   paratracheal lymph node, in keeping with granulomatous disease.           Impression   Small left pleural effusion, increased as compared to prior.  Developing left   upper lobe atelectasis.  Persistent left basilar atelectasis or airspace   disease.  Background pulmonary edema, similar to prior. ______________________________________________________    Subjective:   Dietary Intake: slowly improving   no Nausea   Pain Control: adequate   Complaints: minimal pain at Right EVH site   Bowels: + BM 8/19    Objective:   General appearance: resting comfortably in bed, in nad   Lungs: diminished in bases  Heart: S1S2 normal; ST on monitor, HR low 100s   Chest: symmetrical expansion with inspiration and expirations; No rocking of sternum noted   Abdomen: soft, non-tender  Bowel sounds: normoactive  Kidneys: -1646-535=2225 ml (+ 2 unmeasured occurences) in 24 hrs; Cr 0.8  Wound/Incisions: Midsternal incision CDI; RLE incisions with dressings CDI; Pacing wires out 8/18  Extremities: BLE pulses present; trace lower extremity edema   Neurological: alert, oriented and grossly non-focal   Chest tubes/Drains: Chest tubes out 8/18     Assessment:   Post-op: 4 days. Condition: In stable condition.      Plan:   DC home

## 2021-08-21 NOTE — PROGRESS NOTES
Pt's preferred pharmacy is closed on the weekends. Outpatient pharmacy here contacted and is filling discharge prescription.

## 2021-08-21 NOTE — PLAN OF CARE
Pt verbalized understanding discharge and follow up instructions. Awaiting pt's family for transport home. Will review discharge instructions with pt's family as well.

## 2021-08-25 NOTE — DISCHARGE SUMMARY
Cardiac, Vascular & Thoracic Surgery  Discharge Summary    Patient:  Lucero Galindo 1955 [id number]   Admission Date:  8/17/2021  9:42 AM  Discharge Date:  8/21/2021 MM    Principle Diagnosis: Multivessel CAD    Secondary Diagnosis:  Active Problems:    3-vessel CAD  Resolved Problems:    * No resolved hospital problems. *       LHC: 8/12/21 with Dr. Mooney All AORTIC VALVE  none   LV FUNCTION EF 40-45%   WALL MOTION  apical hypokinesis   MITRAL REGURGITATION  mild      CORONARY ARTERIES  LM Less than 10% proximalmiddistal stenosis            LAD  Calcified, proximal 80% stenosis followed by middistal 100% . Mirta Cheeks are well-developed right to left collaterals.     D1 is a small to medium sized vessel, this could also be described as a ramus artery, it has proximalmiddistal 40 to 50% stenosis.     D2 is A large vessel with proximalmid 80 to 90% stenosis.       LCX Large vessel, proximal 40 to 50% stenosis, middistal into OM 2 which is the largest OM, there is a 60 to 70% proximalmiddistal stenosis.       RCA Dominant, large vessel, proximal 60 to 70% stenosis, middistal 40% stenosis.  There are well-developed right to left collaterals to LAD.     PDA is a medium size vessel with proximalmiddistal 60 to 70% diffuse stenosis. PLV is a large vessel with ostial/proximal 50% stenosis, middistal 40 to 50% stenosis        Procedure: 8/17/21 CORONARY ARTERY BYPASS GRAFTING X3 , INTERNAL MAMMARY ARTERY, SAPHENOUS VEIN GRAFT, ON PUMP WITH LEFT ATRIAL APPENDAGE CLIP  PLACEMENT AND PECTORIALIS NERVE BLOCK     History:  The patient is a 60 y.o. 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 Dr. Rebecca Wade was consulted and performed a Fem-Fem bypass on 6/28/21. During his work up an echocardiogram was done and revealed a reduced EF of 40-45%.  He presented today for a scheduled outpatient cardiac catheterization that revealed multivessel CAD not amenable to PCI. We have been consulted for surgical revascularization. Hospital Course: The post operative period for this patient was uneventful. The patient was discharged on 8/21/2021 and will follow up in the office with Dr. Orville Marsh in one week. Discharged Condition: stable    Disposition:  Home with [facility]    Medications:  Aspirin:start  ADP Inhibitor (plavix/brillinta/effient):start  ACE/ARB:contraindicated 2/2 hypotension  Betablocker:start  Statin:start    Discharge Medications:   Mario Celis. Home Medication Instructions ZOF:[id number]    Printed on:08/25/21 1120   Medication Information                      acetaminophen (APAP EXTRA STRENGTH) 500 MG tablet  Take 2 tablets by mouth every 6 hours as needed for Pain             aspirin 81 MG EC tablet  Take 1 tablet by mouth daily             atorvastatin (LIPITOR) 80 MG tablet  Take 0.5 tablets by mouth nightly             clopidogrel (PLAVIX) 75 MG tablet  Take 1 tablet by mouth daily             famotidine (PEPCID) 20 MG tablet  Take 1 tablet by mouth daily             furosemide (LASIX) 40 MG tablet  Take 1 tablet by mouth daily for 7 days             magnesium oxide (MAG-OX) 400 (241.3 Mg) MG TABS tablet  Take 1 tablet by mouth daily for 7 days             metoprolol tartrate (LOPRESSOR) 50 MG tablet  Take 1 tablet by mouth 2 times daily Hold for SBP <100 or HR <65             oxyCODONE (ROXICODONE) 5 MG immediate release tablet  Take 1 tablet by mouth every 6 hours as needed for Pain for up to 7 days. polyethylene glycol (GLYCOLAX) 17 g packet  Take 17 g by mouth daily for 7 days             potassium chloride (KLOR-CON M) 20 MEQ extended release tablet  Take 1 tablet by mouth daily for 7 days             vitamin B-12 (CYANOCOBALAMIN) 1000 MCG tablet  Take 1,000 mcg by mouth daily                 Patient Instructions:   Activity: DO NOT LIFT, PUSH, OR PULL ANYTHING OVER 5 POUNDS FOR 6 WEEK from the day of surgery  Diet:  cardiac diet  Wound Care:  [address] YOUR INCISIONS DAILY WITH A CLEAN WASHCLOTH AND ANTIBACTERIAL SOAP. Do not wash your incisions after you have cleansed other parts of your body    Follow up with Cardiothoracic Surgeon, Dr. Kaya Jim in one week. Follow up with Cardiologist, Dr. Seb Mistry in 1-2 weeks.       Shabnam Mason, APRN - CNP

## 2021-08-30 ENCOUNTER — OFFICE VISIT (OUTPATIENT)
Dept: CARDIOTHORACIC SURGERY | Age: 66
End: 2021-08-30

## 2021-08-30 VITALS
OXYGEN SATURATION: 97 % | HEART RATE: 85 BPM | TEMPERATURE: 97.5 F | BODY MASS INDEX: 30.62 KG/M2 | WEIGHT: 202 LBS | HEIGHT: 68 IN | DIASTOLIC BLOOD PRESSURE: 64 MMHG | SYSTOLIC BLOOD PRESSURE: 104 MMHG

## 2021-08-30 DIAGNOSIS — I25.10 3-VESSEL CAD: Primary | ICD-10-CM

## 2021-08-30 PROBLEM — Z71.3 DIETARY COUNSELING AND SURVEILLANCE: Status: ACTIVE | Noted: 2020-10-16

## 2021-08-30 PROBLEM — R42 VERTIGO: Status: ACTIVE | Noted: 2021-08-30

## 2021-08-30 PROBLEM — I51.89 GRADE I DIASTOLIC DYSFUNCTION: Status: ACTIVE | Noted: 2021-08-30

## 2021-08-30 PROCEDURE — 99024 POSTOP FOLLOW-UP VISIT: CPT

## 2021-08-30 RX ORDER — POLYETHYLENE GLYCOL 3350 17 G/17G
POWDER, FOR SOLUTION ORAL
COMMUNITY
Start: 2021-06-03

## 2021-08-30 NOTE — LETTER
[facility] Cardiothoracic Surgery  [address] [address]  Phone: [phone]  Fax: [phone] [address], [place]      August 30, 2021     Patient: Jarred Still.   MR Number: <[id number]>   YOB: 1955   Date of Visit: 8/30/2021       Dear Dr. Wu Alt: Thank you for referring Allen Alcazar to me for evaluation/treatment. Below are the relevant portions of my assessment and plan of care. If you have questions, please do not hesitate to call me. I look forward to following Daniel along with you.     Sincerely,        [address], [place]    CC providers:  MD Ree [address]  [phone]  Via In [facility] [address]

## 2021-08-30 NOTE — PROGRESS NOTES
Cardiac, Vascular and Thoracic Surgeons  Clinic Note     8/30/2021 2:44 PM  Surgeon:  Chi Banerjee     S/P :  CABG x3 & VIKTOR clip on 8/17/21    Chief complaint : 1st post op  Subjective:  Mr. Velvet Ackerman is doing well post operatively. MSI and CT sites are healing nicely. No redness, drainage, or dehiscence noted. No edema. Afebrile. Eating and sleeping well. Did have to hold BB once for low BP. Patient asymptomatic. Vital Signs: /64 (Site: Left Upper Arm, Position: Sitting, Cuff Size: Medium Adult)   Pulse 85   Ht 5' 8\" (1.727 m)   Wt 202 lb (91.6 kg)   SpO2 97%   BMI 30.71 kg/m²      I/O:  No intake or output data in the 24 hours ending 08/30/21 1444    Exam:   Cardiovascular: Clear S1, S2. No murmurs, rubs, or gallops. Pulmonary: CTAB    Lower extremity: No peripheral edema. Incision: CDI    Labs:   CBC: No results for input(s): WBC, HGB, HCT, MCV, PLT in the last 72 hours. BMP: No results for input(s): NA, K, CL, CO2, PHOS, BUN, CREATININE in the last 72 hours. Invalid input(s): CA  PT/INR: No results for input(s): PROTIME, INR in the last 72 hours. APTT: No results for input(s): APTT in the last 72 hours. Scheduled Meds:     Patient Active Problem List   Diagnosis    TIA (transient ischemic attack)    Low back pain    HTN (hypertension)    Tobacco abuse    Other hyperlipidemia    Stroke-like symptom    Claudication of both upper extremities due to atherosclerosis ([address]    Ischemic rest pain of lower extremity    PVD (peripheral vascular disease) ([address]    Cardiomyopathy ([address]    S/P right femoral-femoral bypass surgery 6/28/21 Dr. Sheron VIRAMONTES (shortness of breath) on exertion    3-vessel CAD       Assessment/Plan:   1. S/p CABG x3 & VIKTOR clip on 8/17/21  - Doing well; cont to monitor BP and HR. Cont metoprolol 50 mg BID.   - Increase activity as bibiana  - F/u with PCP and cardiology as prescribed  - F/u with Dr. Chi Banerjee and myself in 3 weeks.     Patient was seen & examined by

## 2021-09-10 NOTE — H&P
DIAGNOSIS: Multivessel CAD      CHIEF COMPLAINT:  No chief complaint on file.        History Obtained From:  patient, spouse, electronic medical record     HISTORY OF PRESENT ILLNESS:       The patient is a 60 y.o. 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 Dr. Elodia Camarena was consulted and performed a Fem-Fem bypass on 6/28/21. During his work up an echocardiogram was done and revealed a reduced EF of 40-45%. He presented today for a scheduled outpatient cardiac catheterization that revealed multivessel CAD not amenable to PCI.  We have been consulted for surgical revascularization.      Past Medical History:    Past Medical History             Diagnosis Date    Blind in both eyes       legally blind - has minimal peripheral vision right eye    Constipation      Histoplasmosis      Hyperlipidemia      PVD (peripheral vascular disease) (HCC)              Past Surgical History:    Past Surgical History             Procedure Laterality Date    EYE SURGERY Bilateral       general anesthesia x1     FEMORAL-FEMORAL BYPASS GRAFT Bilateral 6/28/2021     FEMORAL TO FEMORAL BYPASS GRAFT WITH RIGHT LOWER EXTREMITY ANGIOGRAM performed by Teagan Atkinson MD at [place] [address]     Medications Prior to Admission:     Prescriptions Prior to Admission   Medications Prior to Admission: metoprolol succinate (TOPROL XL) 25 MG extended release tablet, Take 1 tablet by mouth nightly  lisinopril (PRINIVIL;ZESTRIL) 10 MG tablet, Take 1 tablet by mouth daily  vitamin B-12 (CYANOCOBALAMIN) 1000 MCG tablet, Take 1,000 mcg by mouth daily  aspirin 81 MG EC tablet, Take 1 tablet by mouth daily (Patient taking differently: Take 81 mg by mouth nightly )  atorvastatin (LIPITOR) 80 MG tablet, Take 0.5 tablets by mouth nightly  acetaminophen (APAP EXTRA STRENGTH) 500 MG tablet, Take 2 tablets by mouth every 6 hours as needed for Pain    Allergies:  Patient has no known allergies.     Social History:    TOBACCO:   reports that he has been smoking cigarettes. He has been smoking about 0.25 packs per day. He has never used smokeless tobacco.  ETOH:   reports previous alcohol use. CAFFEINE ABUSE:  No  DRUGS:   reports no history of drug use. LIFESTYLE: sedentary    MARITAL STATUS:    OCCUPATION:  Retired      Family History:    Family History             Problem Relation Age of Onset    Alzheimer's Disease Mother      Dementia Father              REVIEW OF SYSTEMS:       Constitutional:  No night sweats, headaches, weight loss. Eyes:  No glaucoma, cataracts. +legally blind in both eyes  ENMT:  No nosebleeds, deviated septum. Cardiac:  No arrhythmias, chest pain. Vascular:  No varicosities. +PVD, +Hx of claudication  GI:  No PUD, heartburn. :  No kidney stones, frequent UTIs  Musculoskeletal:  No arthritis, gout. Respiratory:  No SOB, emphysema, asthma. Integumentary:  No dermatitis, itching, rash. Neurological:  No stroke, TIAs, seizures. Psychiatric:  No depression, anxiety. Endocrine: No diabetes, thyroid issues. Hematologic:  No bleeding, easy bruising. Immunologic:  No known cancer, steroid therapies.     PHYSICAL EXAM:     VITALS:  Ht 5' 8\" (1.727 m)   Wt 206 lb (93.4 kg)   BMI 31.32 kg/m²      Constitutional:   Well developed and nourished male. No acute distress. + obesity.     Eyes:  lids and lashes normal, pupils equal, round and reactive to light, extra ocular muscles intact, sclera clear, conjunctiva normal. +legally blind in both eyes.      Head/ENT:   normal teeth, gums, & palate. Moist mucus membranes. No cyanosis or pallor.     Neck:  supple, symmetrical, trachea midline, no lymphadenopathy, no jugular venous distension, no carotid bruits and MASSES:  no masses.   No thyromegaly.     Lungs:  no increased work of breathing, good air exchange, no retractions and clear to auscultation, no crackles or wheezing. No tactile fremitus.     Cardiovascular:  regular rate and rhythm, S1, S2 normal, no murmur, click, rub or gallop. Apical impulse in 5th intercostal space.     Pulses:  Right dorsalis pedis 1, Left dorsalis pedis 1, Right posterior tibial doppler, Left Posterior tibial doppler, Right radial 1, and Left radial 1.       Abdomen:  normal bowel sounds, non-tender, unable to appreciate aorta 2/2 body habitus. No hepatosplenomegaly or masses.     Musculoskeletal:  Back is straight and non-tender, full ROM of upper and lower extremities. No kyphosis or scoliosis.     Extremities:  Warm, pink, no clubbing, cyanosis, petechiae, ischemia, or deformities.   No peripheral edema.     Skin: no rashes, no ecchymoses, no petechiae, no nodules, no jaundice     Neurological/Psychiatric: oriented, normal mood, numbness of RLE has resolved except for on the bottom of his right foot, still has slight numbness/tingling.      DATA:  EK21 Normal sinus rhythm Normal ECG When compared with ECG of 2021 14:58, No significant change was found      CBC with Differential:          Lab Results   Component Value Date     WBC 6.6 2021     RBC 4.09 2021     HGB 14.4 2021     HCT 40.6 2021      2021     MCV 99.4 2021     MCH 35.2 2021     MCHC 35.4 2021     RDW 14.3 2021     LYMPHOPCT 36.6 2021     MONOPCT 8.7 2021     BASOPCT 1.3 2021     MONOSABS 0.7 2021     LYMPHSABS 2.9 2021     EOSABS 0.1 2021     BASOSABS 0.1 2021      CMP:          Lab Results   Component Value Date      2021     K 3.9 2021     K 4.2 2021     CL 99 2021     CO2 22 2021     BUN 8 2021     CREATININE 0.7 2021     GFRAA >60 2021     AGRATIO 1.3 2021     LABGLOM >60 2021     GLUCOSE 102 2021     PROT 7.4 2021     LABALBU 4.1 2021     CALCIUM 9.6 2021     BILITOT 0.6 07/30/2021     ALKPHOS 58 07/30/2021     AST 19 07/30/2021     ALT 17 07/30/2021      Hepatic Function Panel:          Lab Results   Component Value Date     ALKPHOS 58 07/30/2021     ALT 17 07/30/2021     AST 19 07/30/2021     PROT 7.4 07/30/2021     BILITOT 0.6 07/30/2021     BILIDIR <0.2 07/30/2021     IBILI see below 07/30/2021     LABALBU 4.1 07/30/2021      PT/INR:          Lab Results   Component Value Date     PROTIME 12.8 06/22/2021     INR 1.10 06/22/2021      Last 3 Troponin:          Lab Results   Component Value Date     TROPONINI <0.01 10/12/2020         CXR: 5/21/21   Impression   Enlarged cardiac silhouette.         ECHO:  6/11/21 with Dr. Katerina Aguilar:    A bubble study was performed and fails to show evidence of shunting.   Left ventricular systolic function is mildly impaired with ejection fraction estimated at 40-45%.   Global left ventricular hypokinesis.   Grade I diastolic dysfunction with normal left ventricular filling pressure.   Normal left ventricular size and wall thickness.   No significant valvular heart disease.        MRI brain: 5/21/21   Impression       1. No acute intracranial abnormality. No evidence of acute ischemia. 2. Mild left mastoid air cell disease. 3. Focal area of susceptibility artifact with adjacent abnormal FLAIR signal presumably related to a remote small hemorrhagic infarct.    4. Nonspecific periventricular and subcortical white matter signal abnormalities presumably related to chronic small vessel ischemic disease and/or age related degenerative change.             LHC: 8/12/21 with Dr. Newell Clark  LVEDP  12   GRADIENT ACROSS AORTIC VALVE  none   LV FUNCTION EF 40-45%   WALL MOTION  apical hypokinesis   MITRAL REGURGITATION  mild      CORONARY ARTERIES  LM Less than 10% proximalmiddistal stenosis            LAD  Calcified, proximal 80% stenosis followed by middistal 100% . Jeovanny Jaja are well-developed right to left collaterals.     D1 is a small to medium sized vessel, this could also be described as a ramus artery, it has proximalmiddistal 40 to 50% stenosis.     D2 is A large vessel with proximalmid 80 to 90% stenosis.       LCX Large vessel, proximal 40 to 50% stenosis, middistal into OM 2 which is the largest OM, there is a 60 to 70% proximalmiddistal stenosis.       RCA Dominant, large vessel, proximal 60 to 70% stenosis, middistal 40% stenosis.  There are well-developed right to left collaterals to LAD.     PDA is a medium size vessel with proximalmiddistal 60 to 70% diffuse stenosis. PLV is a large vessel with ostial/proximal 50% stenosis, middistal 40 to 50% stenosis         [id number] Score for Atrial Fibrillation Stroke Risk    Risk   Factors   Component Value   C CHF No 0   H HTN Yes 1   A2 Age >= 76 No,  (63 y.o.) 0   D DM No 0   S2 Prior Stroke/TIA Yes 2   V Vascular Disease No 0   A Age 74-69 No,  (63 y.o.) 0   Sc Sex male 0     [id number]  Score   3   Score last updated 8/12/21 59:13 PM EDT     Click here for a link to the UpToDate guideline \"Atrial Fibrillation: Anticoagulation therapy to prevent embolization     Disclaimer: Risk Score calculation is dependent on accuracy of patient problem list and past encounter diagnosis.       ASSESSMENT AND PLAN:  STS Cardiac Surgery Risk profile: CABG     Mortality:  1.08%  Renal Failure:  1.08%  Permanent Stroke:  0.61%  Prolonged Ventilation:  6.99%  Deep Sternal Infection:  0.49%  Reoperation:  1.82%  Morbidity and Mortality:  9.90%  Short LOS:  46.55%  Long LOS:  4.84%     Pt is a 60 y.o. male current smoker that was found to have multivessel CAD during his cardiac cath today. We will conduct pre-op testing this afternoon in anticipation of him discharging home later today. He will come back Tuesday for his surgery (he will be the second case). All questions answered at bedside.      Carotid duplex. Vein mapping. Bedside PFTs.    Fatou Guo Oli Jane - CNP  8/12/2021  12:20 PM                  Cosigned by: Francina Krabbe, MD at 8/14/2021  9:27 AM

## 2021-09-20 ENCOUNTER — OFFICE VISIT (OUTPATIENT)
Dept: CARDIOTHORACIC SURGERY | Age: 66
End: 2021-09-20

## 2021-09-20 VITALS
HEART RATE: 74 BPM | DIASTOLIC BLOOD PRESSURE: 80 MMHG | SYSTOLIC BLOOD PRESSURE: 102 MMHG | OXYGEN SATURATION: 98 % | TEMPERATURE: 97.5 F | HEIGHT: 68 IN | WEIGHT: 200 LBS | BODY MASS INDEX: 30.31 KG/M2

## 2021-09-20 DIAGNOSIS — I25.10 CAD, MULTIPLE VESSEL: Primary | ICD-10-CM

## 2021-09-20 PROCEDURE — 99024 POSTOP FOLLOW-UP VISIT: CPT

## 2021-09-20 RX ORDER — CLOPIDOGREL BISULFATE 75 MG/1
75 TABLET ORAL DAILY
Qty: 30 TABLET | Refills: 1 | Status: SHIPPED | OUTPATIENT
Start: 2021-09-20 | End: 2021-09-28 | Stop reason: SDUPTHER

## 2021-09-20 RX ORDER — METOPROLOL TARTRATE 50 MG/1
50 TABLET, FILM COATED ORAL 2 TIMES DAILY
Qty: 60 TABLET | Refills: 0 | Status: SHIPPED | OUTPATIENT
Start: 2021-09-20 | End: 2021-09-28 | Stop reason: SDUPTHER

## 2021-09-20 NOTE — PROGRESS NOTES
[facility]  Clinic Note     9/20/2021 12:22 PM  Surgeon:  Orville Marsh     S/P :  2nd post-op s/p CABG x4 & VIKTOR clip on 8/17/21    Chief complaint : 2nd post-op  Subjective:  Mr. Clarita Aguila is doing well post-operatively. He is out of his Plavix as of today. His chest is a little bit sore, as he was doing some 6 lb weight exercises. Reminded him that he has 5 lb lifting restriction until next Tuesday 9/28. Midsternal incision, previous chest tube sites, and RLE incision healing well, well-approximated, no redness or drainage. Denies swelling, coughing, SOB. Patient slightly hypotensive, denies any dizziness. Vital Signs: /80 (Site: Right Upper Arm, Position: Sitting)   Pulse 74   Temp 97.5 °F (36.4 °C) (Temporal)   Ht 5' 8\" (1.727 m)   Wt 200 lb (90.7 kg)   SpO2 98%   BMI 30.41 kg/m²      I/O:  No intake or output data in the 24 hours ending 09/20/21 1222    Exam:   Cardiovascular: Clear S1, S2. No MRG. Pulmonary: CTAB    Lower extremity: No peripheral edema. Incision: MS CDI. R leg incisions CDI. Labs:   CBC: No results for input(s): WBC, HGB, HCT, MCV, PLT in the last 72 hours. BMP: No results for input(s): NA, K, CL, CO2, PHOS, BUN, CREATININE in the last 72 hours. Invalid input(s): CA  PT/INR: No results for input(s): PROTIME, INR in the last 72 hours. APTT: No results for input(s): APTT in the last 72 hours.     Scheduled Meds:     Patient Active Problem List   Diagnosis    TIA (transient ischemic attack)    Low back pain    HTN (hypertension)    Tobacco abuse    Other hyperlipidemia    Stroke-like symptom    Claudication of both upper extremities due to atherosclerosis ([address]    Ischemic rest pain of lower extremity    PVD (peripheral vascular disease) ([address]    Cardiomyopathy ([address]    S/P right femoral-femoral bypass surgery 6/28/21 Dr. Sabino Marcus     SOB (shortness of breath) on exertion    3-vessel CAD    Dietary counseling and surveillance    Grade I diastolic dysfunction    Vertigo       Assessment/Plan:   1. 2nd post-op s/p CABG x4 & VIKTOR clip on 8/17/21  - Doing well post-operatively  - Adv activity as bibiana  - F/u with PCP and cardiology as prescribed  - F/u with me PRN    Kirk Hinojosa PA-C

## 2021-09-20 NOTE — LETTER
Formerly [facility], [facility] Cardiothoracic Surgery  [address] [address]  Phone: [phone]  Fax: [phone]           Eugenia Pagan      September 20, 2021     Patient: Donette Jeans.   MR Number: <[id number]>   YOB: 1955   Date of Visit: 9/20/2021       Dear Dr. Sushma Treviño: Thank you for referring Shea Alfaro to me for evaluation/treatment. Below are the relevant portions of my assessment and plan of care. If you have questions, please do not hesitate to call me. I look forward to following Daniel along with you.     Sincerely,        BRYAN Pagan    CC providers:  Jody Haley MD  [facility]  [address]

## 2021-09-27 NOTE — PROGRESS NOTES
[address]   Cardiac Followup    Referring Provider:  JANET Ocampo CNP     Chief Complaint   Patient presents with    3 Month Follow-Up    Coronary Artery Disease    Other     soreness in chest      Subjective: Mr. Clarita Aguila is here for hospital follow s/p CABG; no significant complaints today    History of Present Illness:  Donette Jeans. is a 60 y.o. male who I saw originally 7/13/21. Referred by Kianna Gupta for abnormal ECHO. He has a PMH of CAD s/p 3V CABG 8/17/21 per Dr. Mickey Yoon, legally blind from histoplasmosis, tobacco abuse, and PVD s/p L-R Fem-Fem bypass 6/28/21 per Dr. Sabino Marcus. Most recent CTA head and neck 5/21/21 No flow-limiting arterial stenosis in the head and neck. Most recent ECHO 6/11/21 EF=40-45%. Global LV HK; grade I DD with normal LV filling pressure. Normal LV size and wall thickness. No significant valvular heart disease. Underwent Fem-Fem bypass on 6/28/20 for R. iliac occlusion and severe claudication. Note EKG 7/13/21 SR; 93bpm; PVC's (PVC's new compared 6/21 EKG). Most recent Fátima Prow 7/30/21 showed EF=40%; AK apical lateral wall; large defect apex, mid anteroseptal, and mid septal walls at stress; at rest the apex and apical septal wall is non-reversible suggesting myocardial scar; some reversibility in the mid anteroseptal wall suggesting mixed ischemia/scar. Most recent [facility] 8/12/21 showed MV CAD/ASHD with LAD . He underwent 3V CABG on 8/17/2021. Most recent EKG 8/16/21 noted NSR; PVC's are not longer present 89 bpm.    Today, he reports doing well since CABG. He notes leg is better but still some numbness on bottom of foot after PVD bypass surgery. He notes some leg weakness. He notes that his breathing has mildly improved. He denies chest pain, dizziness, or syncope. He gets on treadmill for about 10 minutes per day without difficulty. He has stopped smoking post-CABG.        Past Medical History:   has a past medical history of Blind in both eyes, Constipation, Histoplasmosis, Hyperlipidemia, and PVD (peripheral vascular disease) ([address] Surgical History:   has a past surgical history that includes eye surgery (Bilateral); Femoral-femoral Bypass Graft (Bilateral, 6/28/2021); vascular surgery; and Coronary artery bypass graft (N/A, 8/17/2021). Social History:  He is . Lives in [facility]. Has 2 children. On disability- legally blind related to histoplasmosis. He currently smoking 4 cigarettes daily. Occasional alcohol use    reports that he quit smoking August 2021. His smoking use included cigarettes. He smoked 0.25 packs per day. He has never used smokeless tobacco. He reports previous alcohol use. He reports that he does not use drugs. Family History:  Sister valve replacement in 63's  family history includes Alzheimer's Disease in his mother; Dementia in his father. Home Medications:  Prior to Admission medications    Medication Sig Start Date End Date Taking? Authorizing Provider   clopidogrel (PLAVIX) 75 MG tablet Take 1 tablet by mouth daily 9/20/21 11/19/21 Yes BRYAN Yepez   polyethylene glycol (MIRALAX) 17 GM/SCOOP powder Take by mouth 6/3/21  Yes Historical Provider, MD   metoprolol tartrate (LOPRESSOR) 50 MG tablet Take 1 tablet by mouth 2 times daily Hold for SBP <100 or HR <65 8/21/21 9/28/21 Yes Henry Liu MD   aspirin 81 MG EC tablet Take 1 tablet by mouth daily  Patient taking differently: Take 81 mg by mouth nightly  5/23/21  Yes Waldo Linton MD   atorvastatin (LIPITOR) 80 MG tablet Take 0.5 tablets by mouth nightly 5/22/21  Yes Waldo Linton MD   acetaminophen (APAP EXTRA STRENGTH) 500 MG tablet Take 2 tablets by mouth every 6 hours as needed for Pain  Patient taking differently: Take 1,000 mg by mouth every 12 hours  5/22/21  Yes Waldo Linton MD        Allergies:  Patient has no known allergies. Review of Systems:   · Constitutional: there has been no unanticipated weight loss. Noted  Neurological/Psychiatric:  · Alert and oriented in all spheres  · Moves all extremities well  · Exhibits normal gait balance and coordination  · No abnormalities of mood, affect, memory, mentation, or behavior are noted        Skin: warm and dry      [facility] 8/12/21   LVGRAM     LVEDP  12   GRADIENT ACROSS AORTIC VALVE  none   LV FUNCTION EF 40-45%   WALL MOTION  apical hypokinesis   MITRAL REGURGITATION  mild         CORONARY ARTERIES     LM Less than 10% proximalmiddistal stenosis            LAD  Calcified, proximal 80% stenosis followed by middistal 100% . There are well-developed right to left collaterals. D1 is a small to medium sized vessel, this could also be described as a ramus artery, it has proximalmiddistal 40 to 50% stenosis. D2 is A large vessel with proximalmid 80 to 90% stenosis. LCX Large vessel, proximal 40 to 50% stenosis, middistal into OM 2 which is the largest OM, there is a 60 to 70% proximalmiddistal stenosis. RCA Dominant, large vessel, proximal 60 to 70% stenosis, middistal 40% stenosis. There are well-developed right to left collaterals to LAD. PDA is a medium size vessel with proximalmiddistal 60 to 70% diffuse stenosis. PLV is a large vessel with ostial/proximal 50% stenosis, middistal 40 to 50% stenosis                  CONCLUSIONS:      Multivessel CAD/ASHD with LAD   Options for revascularization include multivessel high risk PCI including  PCI versus CABG  Patient is weighing/liberating these options and let us know how he wishes to proceed.   Findings are consistent with compensated ischemic cardiomyopathy, continue aspirin, statin, beta-blocker, ACE inhibitor     Lab Results   Component Value Date     08/21/2021    K 4.2 08/21/2021     08/21/2021    CO2 28 08/21/2021    BUN 18 08/21/2021    CREATININE 0.8 08/21/2021    GLUCOSE 126 (H) 08/21/2021    CALCIUM 9.1 08/21/2021    PROT 7.5 08/16/2021    LABALBU 4.1 08/16/2021    BILITOT 0.4 08/16/2021    ALKPHOS 62 08/16/2021    AST 19 08/16/2021    ALT 17 08/16/2021    LABGLOM >60 08/21/2021    GFRAA >60 08/21/2021    AGRATIO 1.2 08/16/2021    GLOB 3.4 08/16/2021       Lab Results   Component Value Date    WBC 8.4 08/21/2021    HGB 11.0 (L) 08/21/2021    HCT 30.9 (L) 08/21/2021    .7 (H) 08/21/2021     08/21/2021       Lab Results   Component Value Date    CHOL 133 08/12/2021    CHOL 230 (H) 05/22/2021     Lab Results   Component Value Date    TRIG 117 08/12/2021    TRIG 158 (H) 05/22/2021     Lab Results   Component Value Date    HDL 45 08/12/2021    HDL 46 07/30/2021    HDL 31 (L) 05/22/2021     Lab Results   Component Value Date    LDLCALC 65 08/12/2021    LDLCALC 56 07/30/2021    LDLCALC 167 (H) 05/22/2021     Lab Results   Component Value Date    LABVLDL 23 08/12/2021    LABVLDL 22 07/30/2021    LABVLDL 32 05/22/2021     No results found for: CHOLHDLRATIO    Assessment:     1. Cardiomyopathy, ischemic:. Most recent ECHO 6/11/21 EF=40-45%. Global LV HK. Will continue BB and ACE-I for LV dysfunction. 2. S/P L-R femoral-femoral bypass surgery: Performed on 6/28/21 Dr. Sheron Roman due to severe claudication, numbness, and Right iliac occlusion. He is feeling better overall. 3. Tobacco abuse: He quit August 2021 post-CABG and I strongly encouraged him to continue cessation of smoking. 4. Other hyperlipidemia: Most recent lipids 8/12/21. I personally reviewed most recent lab results in epic (see above) and discussed with patient. Well controlled and will continue current medical regimen. 5.      CAD s/p 3V CABG: Due to LV systolic dysfcn on ECHO he underwent lexiscan myoview which was abnormal.  Underwent most recent [facility] 8/12/21 showing MV CAD/ASHD with LAD . He underwent 3V CABG on  8/17/2021. There are no concerning symptoms for angina currently. Plan:  1. Medications reviewed. Refills warranted at this time  2.  EKG today shows NSR 75bpm; LV; nonspecific ST change (no sig change 8/21)  3. Follow up 3 months. Call in November to see if 2022 schedule is up. 4. Recheck fasting labs in 3 months. If done at [place] [address], call and let us know so we can get them   5. He declines cardiac rehab    This note was scribed in the presence of Yeni Cummings MD by Tavo Morillo RN. I, Dr. Yeni Cummings, personally performed the services described in this documentation, as scribed by the above signed scribe in my presence. It is both accurate and complete to my knowledge. I agree with the details independently gathered by the clinical support staff, while the remaining scribed note accurately describes my personal service to the patient. Cost of prescription medications and patient compliance have been reviewed with patient. All questions answered. Thank you for allowing me to participate in the care of this individual.    Abram Thrasher.  Everett Izquierdo M.D., [facility]

## 2021-09-28 ENCOUNTER — OFFICE VISIT (OUTPATIENT)
Dept: CARDIOLOGY CLINIC | Age: 66
End: 2021-09-28
Payer: MEDICARE

## 2021-09-28 VITALS
HEIGHT: 68 IN | OXYGEN SATURATION: 98 % | TEMPERATURE: 97.7 F | BODY MASS INDEX: 30.62 KG/M2 | DIASTOLIC BLOOD PRESSURE: 68 MMHG | HEART RATE: 77 BPM | WEIGHT: 202 LBS | SYSTOLIC BLOOD PRESSURE: 110 MMHG

## 2021-09-28 DIAGNOSIS — E78.49 OTHER HYPERLIPIDEMIA: Primary | ICD-10-CM

## 2021-09-28 DIAGNOSIS — I25.10 3-VESSEL CAD: ICD-10-CM

## 2021-09-28 DIAGNOSIS — I73.9 PVD (PERIPHERAL VASCULAR DISEASE) (HCC): ICD-10-CM

## 2021-09-28 DIAGNOSIS — Z87.891 HISTORY OF TOBACCO ABUSE: ICD-10-CM

## 2021-09-28 DIAGNOSIS — I42.9 CARDIOMYOPATHY, UNSPECIFIED TYPE (HCC): ICD-10-CM

## 2021-09-28 DIAGNOSIS — I15.9 SECONDARY HYPERTENSION: ICD-10-CM

## 2021-09-28 PROCEDURE — 3017F COLORECTAL CA SCREEN DOC REV: CPT | Performed by: INTERNAL MEDICINE

## 2021-09-28 PROCEDURE — 1036F TOBACCO NON-USER: CPT | Performed by: INTERNAL MEDICINE

## 2021-09-28 PROCEDURE — G8427 DOCREV CUR MEDS BY ELIG CLIN: HCPCS | Performed by: INTERNAL MEDICINE

## 2021-09-28 PROCEDURE — 93000 ELECTROCARDIOGRAM COMPLETE: CPT | Performed by: INTERNAL MEDICINE

## 2021-09-28 PROCEDURE — G8417 CALC BMI ABV UP PARAM F/U: HCPCS | Performed by: INTERNAL MEDICINE

## 2021-09-28 PROCEDURE — 99214 OFFICE O/P EST MOD 30 MIN: CPT | Performed by: INTERNAL MEDICINE

## 2021-09-28 RX ORDER — METOPROLOL TARTRATE 50 MG/1
50 TABLET, FILM COATED ORAL 2 TIMES DAILY
Qty: 180 TABLET | Refills: 3 | Status: SHIPPED | OUTPATIENT
Start: 2021-09-28 | End: 2021-10-28

## 2021-09-28 RX ORDER — ATORVASTATIN CALCIUM 40 MG/1
40 TABLET, FILM COATED ORAL NIGHTLY
Qty: 90 TABLET | Refills: 3 | Status: SHIPPED | OUTPATIENT
Start: 2021-09-28

## 2021-09-28 RX ORDER — CLOPIDOGREL BISULFATE 75 MG/1
75 TABLET ORAL DAILY
Qty: 90 TABLET | Refills: 1 | Status: SHIPPED | OUTPATIENT
Start: 2021-09-28 | End: 2021-11-27

## 2021-09-28 NOTE — PATIENT INSTRUCTIONS
Plan:  1. Medications reviewed. Refills warranted at this time  2. EKG today  3. Follow up 3 months. Call in November to see if 2022 schedule is up. 4. Recheck fasting labs in 3 months. If done at [place] [address], call and let us know so we can get them   5.  He declines cardiac rehab

## 2021-12-15 LAB
CHOLESTEROL, TOTAL: 159 MG/DL
CHOLESTEROL/HDL RATIO: NORMAL
HDLC SERPL-MCNC: 53 MG/DL (ref 35–70)
LDL CHOLESTEROL CALCULATED: 85 MG/DL (ref 0–160)
NONHDLC SERPL-MCNC: NORMAL MG/DL
TRIGL SERPL-MCNC: 117 MG/DL
VLDLC SERPL CALC-MCNC: 21 MG/DL

## 2021-12-16 DIAGNOSIS — E78.49 OTHER HYPERLIPIDEMIA: ICD-10-CM

## 2023-05-25 ENCOUNTER — HOSPITAL ENCOUNTER (OUTPATIENT)
Dept: ULTRASOUND IMAGING | Age: 68
Discharge: HOME | End: 2023-05-25
Payer: MEDICARE

## 2023-05-25 DIAGNOSIS — R10.12 LUQ ABDOMINAL PAIN: ICD-10-CM

## 2023-05-25 PROCEDURE — 76705 ECHO EXAM OF ABDOMEN: CPT

## 2023-06-19 ENCOUNTER — APPOINTMENT (OUTPATIENT)
Dept: CT IMAGING | Age: 68
End: 2023-06-19
Payer: MEDICARE

## 2023-06-19 ENCOUNTER — HOSPITAL ENCOUNTER (EMERGENCY)
Age: 68
Discharge: HOME | End: 2023-06-19
Attending: EMERGENCY MEDICINE
Payer: MEDICARE

## 2023-06-19 VITALS
DIASTOLIC BLOOD PRESSURE: 75 MMHG | BODY MASS INDEX: 36.37 KG/M2 | HEART RATE: 82 BPM | RESPIRATION RATE: 17 BRPM | WEIGHT: 240 LBS | TEMPERATURE: 98.6 F | HEIGHT: 68 IN | OXYGEN SATURATION: 98 % | SYSTOLIC BLOOD PRESSURE: 103 MMHG

## 2023-06-19 DIAGNOSIS — R07.9 CHRONIC CHEST PAIN: ICD-10-CM

## 2023-06-19 DIAGNOSIS — N20.0 KIDNEY STONE: ICD-10-CM

## 2023-06-19 DIAGNOSIS — K80.20 GALLSTONES: ICD-10-CM

## 2023-06-19 DIAGNOSIS — I70.90 ATHEROSCLEROSIS: ICD-10-CM

## 2023-06-19 DIAGNOSIS — R10.12 ABDOMINAL PAIN, LEFT UPPER QUADRANT: Primary | ICD-10-CM

## 2023-06-19 DIAGNOSIS — R10.9 LEFT FLANK PAIN: ICD-10-CM

## 2023-06-19 DIAGNOSIS — G89.29 CHRONIC CHEST PAIN: ICD-10-CM

## 2023-06-19 LAB
ALBUMIN SERPL-MCNC: 4.3 G/DL (ref 3.4–5)
ALBUMIN/GLOB SERPL: 1.4 {RATIO} (ref 1.1–2.2)
ALP SERPL-CCNC: 62 U/L (ref 40–129)
ALT SERPL-CCNC: 25 U/L (ref 10–40)
ANION GAP SERPL CALCULATED.3IONS-SCNC: 12 MMOL/L (ref 3–16)
AST SERPL-CCNC: 21 U/L (ref 15–37)
BASOPHILS # BLD: 0.1 K/UL (ref 0–0.2)
BASOPHILS NFR BLD: 0.7 %
BILIRUB SERPL-MCNC: 0.5 MG/DL (ref 0–1)
BILIRUB UR QL STRIP.AUTO: NEGATIVE
BUN SERPL-MCNC: 14 MG/DL (ref 7–20)
CALCIUM SERPL-MCNC: 9.6 MG/DL (ref 8.3–10.6)
CHLORIDE SERPL-SCNC: 103 MMOL/L (ref 99–110)
CLARITY UR: CLEAR
CO2 SERPL-SCNC: 23 MMOL/L (ref 21–32)
COLOR UR: YELLOW
CREAT SERPL-MCNC: 1.1 MG/DL (ref 0.8–1.3)
DEPRECATED RDW RBC AUTO: 13.7 % (ref 12.4–15.4)
EKG ATRIAL RATE: 60 BPM
EKG DIAGNOSIS: NORMAL
EKG P AXIS: 18 DEGREES
EKG P-R INTERVAL: 182 MS
EKG Q-T INTERVAL: 398 MS
EKG QRS DURATION: 92 MS
EKG QTC CALCULATION (BAZETT): 398 MS
EKG R AXIS: 44 DEGREES
EKG T AXIS: 40 DEGREES
EKG VENTRICULAR RATE: 60 BPM
EOSINOPHIL # BLD: 0.1 K/UL (ref 0–0.6)
EOSINOPHIL NFR BLD: 0.9 %
GFR SERPLBLD CREATININE-BSD FMLA CKD-EPI: >60 ML/MIN/{1.73_M2}
GLUCOSE SERPL-MCNC: 140 MG/DL (ref 70–99)
GLUCOSE UR STRIP.AUTO-MCNC: NEGATIVE MG/DL
HCT VFR BLD AUTO: 41.6 % (ref 40.5–52.5)
HGB BLD-MCNC: 14.4 G/DL (ref 13.5–17.5)
HGB UR QL STRIP.AUTO: NEGATIVE
KETONES UR STRIP.AUTO-MCNC: NEGATIVE MG/DL
LEUKOCYTE ESTERASE UR QL STRIP.AUTO: NEGATIVE
LIPASE SERPL-CCNC: 43 U/L (ref 13–60)
LYMPHOCYTES # BLD: 2 K/UL (ref 1–5.1)
LYMPHOCYTES NFR BLD: 21.2 %
MCH RBC QN AUTO: 33.9 PG (ref 26–34)
MCHC RBC AUTO-ENTMCNC: 34.6 G/DL (ref 31–36)
MCV RBC AUTO: 98 FL (ref 80–100)
MONOCYTES # BLD: 0.8 K/UL (ref 0–1.3)
MONOCYTES NFR BLD: 8.6 %
NEUTROPHILS # BLD: 6.5 K/UL (ref 1.7–7.7)
NEUTROPHILS NFR BLD: 68.6 %
NITRITE UR QL STRIP.AUTO: NEGATIVE
PH UR STRIP.AUTO: 5.5 [PH] (ref 5–8)
PLATELET # BLD AUTO: 231 K/UL (ref 135–450)
PMV BLD AUTO: 7.6 FL (ref 5–10.5)
POTASSIUM SERPL-SCNC: 4 MMOL/L (ref 3.5–5.1)
PROT SERPL-MCNC: 7.3 G/DL (ref 6.4–8.2)
PROT UR STRIP.AUTO-MCNC: NEGATIVE MG/DL
RBC # BLD AUTO: 4.24 M/UL (ref 4.2–5.9)
SODIUM SERPL-SCNC: 138 MMOL/L (ref 136–145)
SP GR UR STRIP.AUTO: >=1.03 (ref 1–1.03)
TROPONIN, HIGH SENSITIVITY: 13 NG/L (ref 0–22)
UA COMPLETE W REFLEX CULTURE PNL UR: NORMAL
UA DIPSTICK W REFLEX MICRO PNL UR: NORMAL
URN SPEC COLLECT METH UR: NORMAL
UROBILINOGEN UR STRIP-ACNC: 0.2 E.U./DL
WBC # BLD AUTO: 9.5 K/UL (ref 4–11)

## 2023-06-19 PROCEDURE — 85025 COMPLETE CBC W/AUTO DIFF WBC: CPT

## 2023-06-19 PROCEDURE — 74174 CTA ABD&PLVS W/CONTRAST: CPT

## 2023-06-19 PROCEDURE — 74176 CT ABD & PELVIS W/O CONTRAST: CPT

## 2023-06-19 PROCEDURE — 6360000004 HC RX CONTRAST MEDICATION: Performed by: EMERGENCY MEDICINE

## 2023-06-19 PROCEDURE — 96361 HYDRATE IV INFUSION ADD-ON: CPT | Performed by: EMERGENCY MEDICINE

## 2023-06-19 PROCEDURE — 6360000002 HC RX W HCPCS: Performed by: EMERGENCY MEDICINE

## 2023-06-19 PROCEDURE — 93010 ELECTROCARDIOGRAM REPORT: CPT | Performed by: INTERNAL MEDICINE

## 2023-06-19 PROCEDURE — 83690 ASSAY OF LIPASE: CPT

## 2023-06-19 PROCEDURE — 84484 ASSAY OF TROPONIN QUANT: CPT

## 2023-06-19 PROCEDURE — 93005 ELECTROCARDIOGRAM TRACING: CPT | Performed by: EMERGENCY MEDICINE

## 2023-06-19 PROCEDURE — 2580000003 HC RX 258: Performed by: EMERGENCY MEDICINE

## 2023-06-19 PROCEDURE — 36415 COLL VENOUS BLD VENIPUNCTURE: CPT

## 2023-06-19 PROCEDURE — 81003 URINALYSIS AUTO W/O SCOPE: CPT

## 2023-06-19 PROCEDURE — 99285 EMERGENCY DEPT VISIT HI MDM: CPT | Performed by: EMERGENCY MEDICINE

## 2023-06-19 PROCEDURE — 80053 COMPREHEN METABOLIC PANEL: CPT

## 2023-06-19 PROCEDURE — 96375 TX/PRO/DX INJ NEW DRUG ADDON: CPT | Performed by: EMERGENCY MEDICINE

## 2023-06-19 PROCEDURE — 96374 THER/PROPH/DIAG INJ IV PUSH: CPT | Performed by: EMERGENCY MEDICINE

## 2023-06-19 RX ORDER — KETOROLAC TROMETHAMINE 30 MG/ML
15 INJECTION, SOLUTION INTRAMUSCULAR; INTRAVENOUS ONCE
Status: DISCONTINUED | OUTPATIENT
Start: 2023-06-19 | End: 2023-06-19 | Stop reason: HOSPADM

## 2023-06-19 RX ORDER — 0.9 % SODIUM CHLORIDE 0.9 %
500 INTRAVENOUS SOLUTION INTRAVENOUS ONCE
Status: COMPLETED | OUTPATIENT
Start: 2023-06-19 | End: 2023-06-19

## 2023-06-19 RX ORDER — ONDANSETRON 2 MG/ML
4 INJECTION INTRAMUSCULAR; INTRAVENOUS ONCE
Status: COMPLETED | OUTPATIENT
Start: 2023-06-19 | End: 2023-06-19

## 2023-06-19 RX ORDER — PANTOPRAZOLE SODIUM 40 MG/1
40 FOR SUSPENSION ORAL
COMMUNITY

## 2023-06-19 RX ORDER — MORPHINE SULFATE 4 MG/ML
4 INJECTION, SOLUTION INTRAMUSCULAR; INTRAVENOUS ONCE
Status: COMPLETED | OUTPATIENT
Start: 2023-06-19 | End: 2023-06-19

## 2023-06-19 RX ADMIN — ONDANSETRON 4 MG: 2 INJECTION INTRAMUSCULAR; INTRAVENOUS at 04:49

## 2023-06-19 RX ADMIN — IOPAMIDOL 85 ML: 755 INJECTION, SOLUTION INTRAVENOUS at 07:40

## 2023-06-19 RX ADMIN — SODIUM CHLORIDE 500 ML: 9 INJECTION, SOLUTION INTRAVENOUS at 04:49

## 2023-06-19 RX ADMIN — HYDROMORPHONE HYDROCHLORIDE 0.5 MG: 1 INJECTION, SOLUTION INTRAMUSCULAR; INTRAVENOUS; SUBCUTANEOUS at 05:39

## 2023-06-19 RX ADMIN — MORPHINE SULFATE 4 MG: 4 INJECTION, SOLUTION INTRAMUSCULAR; INTRAVENOUS at 04:49

## 2023-06-19 ASSESSMENT — ENCOUNTER SYMPTOMS
VOMITING: 0
DIARRHEA: 0
CONSTIPATION: 1
ABDOMINAL PAIN: 1
BACK PAIN: 1
NAUSEA: 1
SHORTNESS OF BREATH: 1

## 2023-06-19 ASSESSMENT — PAIN - FUNCTIONAL ASSESSMENT: PAIN_FUNCTIONAL_ASSESSMENT: 0-10

## 2023-06-19 ASSESSMENT — PAIN SCALES - GENERAL
PAINLEVEL_OUTOF10: 8
PAINLEVEL_OUTOF10: 0
PAINLEVEL_OUTOF10: 8
PAINLEVEL_OUTOF10: 8

## 2023-06-19 ASSESSMENT — LIFESTYLE VARIABLES
HOW MANY STANDARD DRINKS CONTAINING ALCOHOL DO YOU HAVE ON A TYPICAL DAY: PATIENT DOES NOT DRINK
HOW OFTEN DO YOU HAVE A DRINK CONTAINING ALCOHOL: NEVER

## 2023-06-19 NOTE — DISCHARGE INSTRUCTIONS
Take Tylenol as needed for pain. Take Zofran for nausea. Stay hydrated. Follow-up with your primary doctor over the next 3 to 5 days for any other concerns. You also may want to follow-up with GI for possible endoscopy or colonoscopy. Return to the emergency department for any worsening abdominal pain with nonstop vomiting, fever, development of significant chest pain with shortness of breath, or any other concerns.

## 2023-06-19 NOTE — ED PROVIDER NOTES
[address] ED  EMERGENCY DEPARTMENT ENCOUNTER        Pt Name: Bebo Sevilla MRN: [id number]  Birthdate 1955  Date of evaluation: 6/19/2023  Provider: Odalis Lucio MD  PCP: Rob Bower, APRN - [facility]       Chief Complaint   Patient presents with    Flank Pain     L    Abdominal Pain     LUQ       HISTORY OFPRESENT ILLNESS   (Location/Symptom, Timing/Onset, Context/Setting, Quality, Duration, Modifying Factors,Severity)  Note limiting factors. Bebo Sevilla is a 78 y.o. male presenting today due to concern for intermittent left flank pain over the last few months that comes on randomly but when it does manifest it normally goes away after 5 to 6 hours but he has had persistent pain since 9 PM last night which is not going away even after trying Tylenol so he finally decided to come to the emergency department to be evaluated. Based on chart review he did have an ultrasound of his abdomen a few months ago which did not show any hydronephrosis or other significant pathology such as a kidney stone. He did have a kidney stone many years ago but denies any since. He does state his son gets frequent kidney stones. He denies any vomiting or diarrhea but does feel nauseated. He also reports having some indigestion and belching but denies any actual chest pain or shortness of breath out of the ordinary for him since he has chronic issues with these. He does have a history of a CABG. He is on aspirin and Plavix. No falls or trauma. He denies any new numbness or weakness in the arms or legs. He did have a prior femoral bypass surgery as well. Due to concern for persistent left flank pain radiating towards the left abdominal region, he came to the ED with his wife for further evaluation. He does not think that he is allergic to morphine.         REVIEW OF SYSTEMS    (2-9 systems for level 4, 10 or more for level 5)     Review of Systems   Constitutional:

## 2023-06-19 NOTE — ED NOTES
Urine specimen and blood work collected and sent to lab at this time.      Evelia Chacon RN  06/19/23 [id number]

## 2023-06-19 NOTE — ED NOTES
Discharge instructions reviewed with patient. All question answered. Pt verbalized understanding.        Charity James RN  06/19/23 [id number]

## 2024-02-21 ENCOUNTER — APPOINTMENT (OUTPATIENT)
Dept: CT IMAGING | Age: 69
End: 2024-02-21
Payer: MEDICARE

## 2024-02-21 ENCOUNTER — HOSPITAL ENCOUNTER (EMERGENCY)
Age: 69
Discharge: HOME | End: 2024-02-21
Payer: MEDICARE

## 2024-02-21 VITALS
OXYGEN SATURATION: 96 % | BODY MASS INDEX: 34.53 KG/M2 | RESPIRATION RATE: 16 BRPM | DIASTOLIC BLOOD PRESSURE: 79 MMHG | TEMPERATURE: 98.1 F | HEIGHT: 67 IN | WEIGHT: 220 LBS | HEART RATE: 78 BPM | SYSTOLIC BLOOD PRESSURE: 116 MMHG

## 2024-02-21 DIAGNOSIS — S09.90XA INJURY OF HEAD, INITIAL ENCOUNTER: ICD-10-CM
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
## 2024-02-21 PROCEDURE — 70450 CT HEAD/BRAIN W/O DYE: CPT

## 2024-02-21 PROCEDURE — 99284 EMERGENCY DEPT VISIT MOD MDM: CPT

## 2024-02-21 ASSESSMENT — PAIN SCALES - GENERAL: PAINLEVEL_OUTOF10: 1

## 2024-02-21 ASSESSMENT — PAIN DESCRIPTION - LOCATION: LOCATION: HEAD

## 2024-02-21 ASSESSMENT — PAIN DESCRIPTION - ORIENTATION: ORIENTATION: POSTERIOR

## 2024-02-21 ASSESSMENT — PAIN DESCRIPTION - DESCRIPTORS: DESCRIPTORS: ACHING

## 2024-02-21 ASSESSMENT — PAIN - FUNCTIONAL ASSESSMENT: PAIN_FUNCTIONAL_ASSESSMENT: 0-10

## 2024-02-21 NOTE — ED PROVIDER NOTES
symptoms under observation in the ER.  I do feel he is stable for outpatient treatment and will return if he develops any worsening headache or vomiting or new injury.    The patient tolerated their visit well.  The patient and / or the family were informed of the results of any tests, a time was given to answer questions, a plan was proposed and they agreed with plan.    I am the Primary Clinician of Record.  FINAL IMPRESSION      1. Fall, initial encounter    2. Injury of head, initial encounter          DISPOSITION/PLAN     DISPOSITION Decision To Discharge 02/21/2024 06:15:37 PM      PATIENT REFERRED TO:  No follow-up provider specified.    DISCHARGE MEDICATIONS:  New Prescriptions    No medications on file       DISCONTINUED MEDICATIONS:  Discontinued Medications    No medications on file              (Please note that portions of this note were completed with a voice recognition program.  Efforts were made to edit the dictations but occasionally words are mis-transcribed.)    Sonya Huang PA-C (electronically signed)       Sonya Huang PA-C  02/21/24 [id number]

## 2024-03-06 ENCOUNTER — APPOINTMENT (OUTPATIENT)
Dept: GENERAL RADIOLOGY | Age: 69
DRG: 313 | End: 2024-03-06
Payer: MEDICARE

## 2024-03-06 ENCOUNTER — APPOINTMENT (OUTPATIENT)
Dept: CT IMAGING | Age: 69
DRG: 313 | End: 2024-03-06
Payer: MEDICARE

## 2024-03-06 ENCOUNTER — HOSPITAL ENCOUNTER (INPATIENT)
Age: 69
LOS: 1 days | Discharge: HOME | DRG: 313 | End: 2024-03-08
Attending: STUDENT IN AN ORGANIZED HEALTH CARE EDUCATION/TRAINING PROGRAM | Admitting: INTERNAL MEDICINE
Payer: MEDICARE

## 2024-03-06 DIAGNOSIS — K80.80 BILIARY CALCULUS OF OTHER SITE WITHOUT OBSTRUCTION: ICD-10-CM

## 2024-03-06 DIAGNOSIS — R06.02 SHORTNESS OF BREATH: ICD-10-CM

## 2024-03-06 DIAGNOSIS — R07.9 CHEST PAIN, UNSPECIFIED TYPE: Primary | ICD-10-CM

## 2024-03-06 DIAGNOSIS — R10.12 LEFT UPPER QUADRANT ABDOMINAL PAIN: ICD-10-CM

## 2024-03-06 DIAGNOSIS — Z95.1 HX OF CABG: ICD-10-CM

## 2024-03-06 PROBLEM — I50.9 CHRONIC CONGESTIVE HEART FAILURE (HCC): Status: ACTIVE | Noted: 2024-03-06

## 2024-03-06 PROBLEM — R09.02 HYPOXIA: Status: ACTIVE | Noted: 2024-03-06

## 2024-03-06 LAB
ALBUMIN SERPL-MCNC: 4.2 G/DL (ref 3.4–5)
ALBUMIN/GLOB SERPL: 1.4 {RATIO} (ref 1.1–2.2)
ALP SERPL-CCNC: 68 U/L (ref 40–129)
ALT SERPL-CCNC: 23 U/L (ref 10–40)
ANION GAP SERPL CALCULATED.3IONS-SCNC: 12 MMOL/L (ref 3–16)
AST SERPL-CCNC: 18 U/L (ref 15–37)
BASE EXCESS BLDV CALC-SCNC: -0.1 MMOL/L (ref -3–3)
BASOPHILS # BLD: 0.1 K/UL (ref 0–0.2)
BASOPHILS NFR BLD: 0.8 %
BILIRUB SERPL-MCNC: 0.7 MG/DL (ref 0–1)
BUN SERPL-MCNC: 11 MG/DL (ref 7–20)
CALCIUM SERPL-MCNC: 9.2 MG/DL (ref 8.3–10.6)
CHLORIDE SERPL-SCNC: 103 MMOL/L (ref 99–110)
CO2 BLDV-SCNC: 26 MMOL/L
CO2 SERPL-SCNC: 25 MMOL/L (ref 21–32)
COHGB MFR BLDV: 1.5 % (ref 0–1.5)
CREAT SERPL-MCNC: 0.9 MG/DL (ref 0.8–1.3)
DEPRECATED RDW RBC AUTO: 13.7 % (ref 12.4–15.4)
EKG ATRIAL RATE: 63 BPM
EKG ATRIAL RATE: 72 BPM
EKG DIAGNOSIS: NORMAL
EKG DIAGNOSIS: NORMAL
EKG P AXIS: 49 DEGREES
EKG P AXIS: 8 DEGREES
EKG P-R INTERVAL: 162 MS
EKG P-R INTERVAL: 172 MS
EKG Q-T INTERVAL: 390 MS
EKG Q-T INTERVAL: 394 MS
EKG QRS DURATION: 84 MS
EKG QRS DURATION: 86 MS
EKG QTC CALCULATION (BAZETT): 399 MS
EKG QTC CALCULATION (BAZETT): 431 MS
EKG R AXIS: 48 DEGREES
EKG R AXIS: 62 DEGREES
EKG T AXIS: 68 DEGREES
EKG T AXIS: 81 DEGREES
EKG VENTRICULAR RATE: 63 BPM
EKG VENTRICULAR RATE: 72 BPM
EOSINOPHIL # BLD: 0 K/UL (ref 0–0.6)
EOSINOPHIL NFR BLD: 0.4 %
FLUAV RNA RESP QL NAA+PROBE: NOT DETECTED
FLUBV RNA RESP QL NAA+PROBE: NOT DETECTED
GFR SERPLBLD CREATININE-BSD FMLA CKD-EPI: >60 ML/MIN/{1.73_M2}
GLUCOSE BLD-MCNC: 110 MG/DL (ref 70–99)
GLUCOSE SERPL-MCNC: 126 MG/DL (ref 70–99)
HCO3 BLDV-SCNC: 24.6 MMOL/L (ref 23–29)
HCT VFR BLD AUTO: 41.7 % (ref 40.5–52.5)
HGB BLD-MCNC: 14.5 G/DL (ref 13.5–17.5)
INR PPP: 1.02 (ref 0.84–1.16)
LACTATE BLDV-SCNC: 1.8 MMOL/L (ref 0.4–1.9)
LIPASE SERPL-CCNC: 23 U/L (ref 13–60)
LYMPHOCYTES # BLD: 1.7 K/UL (ref 1–5.1)
LYMPHOCYTES NFR BLD: 17.1 %
MCH RBC QN AUTO: 33.8 PG (ref 26–34)
MCHC RBC AUTO-ENTMCNC: 34.7 G/DL (ref 31–36)
MCV RBC AUTO: 97.5 FL (ref 80–100)
METHGB MFR BLDV: 0.3 %
MONOCYTES # BLD: 0.7 K/UL (ref 0–1.3)
MONOCYTES NFR BLD: 7.3 %
NEUTROPHILS # BLD: 7.3 K/UL (ref 1.7–7.7)
NEUTROPHILS NFR BLD: 74.4 %
NT-PROBNP SERPL-MCNC: 444 PG/ML (ref 0–124)
O2 CT VFR BLDV CALC: 14 VOL %
O2 THERAPY: NORMAL
PCO2 BLDV: 40.7 MMHG (ref 40–50)
PERFORMED ON: ABNORMAL
PH BLDV: 7.4 [PH] (ref 7.35–7.45)
PLATELET # BLD AUTO: 191 K/UL (ref 135–450)
PMV BLD AUTO: 7 FL (ref 5–10.5)
PO2 BLDV: 35.4 MMHG (ref 25–40)
POTASSIUM SERPL-SCNC: 3.9 MMOL/L (ref 3.5–5.1)
PROT SERPL-MCNC: 7.2 G/DL (ref 6.4–8.2)
PROTHROMBIN TIME: 13.4 SEC (ref 11.5–14.8)
RBC # BLD AUTO: 4.28 M/UL (ref 4.2–5.9)
SAO2 % BLDV: 68 %
SARS-COV-2 RNA RESP QL NAA+PROBE: NOT DETECTED
TROPONIN, HIGH SENSITIVITY: 10 NG/L (ref 0–22)
TROPONIN, HIGH SENSITIVITY: 10 NG/L (ref 0–22)
TROPONIN, HIGH SENSITIVITY: 9 NG/L (ref 0–22)
WBC # BLD AUTO: 9.9 K/UL (ref 4–11)

## 2024-03-06 PROCEDURE — G0378 HOSPITAL OBSERVATION PER HR: HCPCS

## 2024-03-06 PROCEDURE — 82803 BLOOD GASES ANY COMBINATION: CPT

## 2024-03-06 PROCEDURE — 6360000002 HC RX W HCPCS

## 2024-03-06 PROCEDURE — C9113 INJ PANTOPRAZOLE SODIUM, VIA: HCPCS

## 2024-03-06 PROCEDURE — 71045 X-RAY EXAM CHEST 1 VIEW: CPT

## 2024-03-06 PROCEDURE — 99223 1ST HOSP IP/OBS HIGH 75: CPT

## 2024-03-06 PROCEDURE — 93005 ELECTROCARDIOGRAM TRACING: CPT | Performed by: STUDENT IN AN ORGANIZED HEALTH CARE EDUCATION/TRAINING PROGRAM

## 2024-03-06 PROCEDURE — 99222 1ST HOSP IP/OBS MODERATE 55: CPT | Performed by: INTERNAL MEDICINE

## 2024-03-06 PROCEDURE — 96375 TX/PRO/DX INJ NEW DRUG ADDON: CPT

## 2024-03-06 PROCEDURE — 71260 CT THORAX DX C+: CPT

## 2024-03-06 PROCEDURE — 83690 ASSAY OF LIPASE: CPT

## 2024-03-06 PROCEDURE — 84484 ASSAY OF TROPONIN QUANT: CPT

## 2024-03-06 PROCEDURE — 87636 SARSCOV2 & INF A&B AMP PRB: CPT

## 2024-03-06 PROCEDURE — 99285 EMERGENCY DEPT VISIT HI MDM: CPT

## 2024-03-06 PROCEDURE — 85025 COMPLETE CBC W/AUTO DIFF WBC: CPT

## 2024-03-06 PROCEDURE — 83880 ASSAY OF NATRIURETIC PEPTIDE: CPT

## 2024-03-06 PROCEDURE — 93005 ELECTROCARDIOGRAM TRACING: CPT

## 2024-03-06 PROCEDURE — 96376 TX/PRO/DX INJ SAME DRUG ADON: CPT

## 2024-03-06 PROCEDURE — 2580000003 HC RX 258

## 2024-03-06 PROCEDURE — 6370000000 HC RX 637 (ALT 250 FOR IP)

## 2024-03-06 PROCEDURE — 6360000004 HC RX CONTRAST MEDICATION: Performed by: STUDENT IN AN ORGANIZED HEALTH CARE EDUCATION/TRAINING PROGRAM

## 2024-03-06 PROCEDURE — 96372 THER/PROPH/DIAG INJ SC/IM: CPT

## 2024-03-06 PROCEDURE — 6360000002 HC RX W HCPCS: Performed by: STUDENT IN AN ORGANIZED HEALTH CARE EDUCATION/TRAINING PROGRAM

## 2024-03-06 PROCEDURE — 85610 PROTHROMBIN TIME: CPT

## 2024-03-06 PROCEDURE — 6370000000 HC RX 637 (ALT 250 FOR IP): Performed by: STUDENT IN AN ORGANIZED HEALTH CARE EDUCATION/TRAINING PROGRAM

## 2024-03-06 PROCEDURE — 74177 CT ABD & PELVIS W/CONTRAST: CPT

## 2024-03-06 PROCEDURE — 36415 COLL VENOUS BLD VENIPUNCTURE: CPT

## 2024-03-06 PROCEDURE — 93010 ELECTROCARDIOGRAM REPORT: CPT | Performed by: INTERNAL MEDICINE

## 2024-03-06 PROCEDURE — 80053 COMPREHEN METABOLIC PANEL: CPT

## 2024-03-06 PROCEDURE — 74018 RADEX ABDOMEN 1 VIEW: CPT

## 2024-03-06 PROCEDURE — 85730 THROMBOPLASTIN TIME PARTIAL: CPT

## 2024-03-06 PROCEDURE — 83605 ASSAY OF LACTIC ACID: CPT

## 2024-03-06 PROCEDURE — 96374 THER/PROPH/DIAG INJ IV PUSH: CPT

## 2024-03-06 RX ORDER — PANTOPRAZOLE SODIUM 40 MG/1
40 TABLET, DELAYED RELEASE ORAL
Status: DISCONTINUED | OUTPATIENT
Start: 2024-03-07 | End: 2024-03-06

## 2024-03-06 RX ORDER — ENOXAPARIN SODIUM 100 MG/ML
40 INJECTION SUBCUTANEOUS DAILY
Status: DISCONTINUED | OUTPATIENT
Start: 2024-03-06 | End: 2024-03-08

## 2024-03-06 RX ORDER — PANTOPRAZOLE SODIUM 40 MG/10ML
40 INJECTION, POWDER, LYOPHILIZED, FOR SOLUTION INTRAVENOUS DAILY
Status: DISCONTINUED | OUTPATIENT
Start: 2024-03-06 | End: 2024-03-06

## 2024-03-06 RX ORDER — CLOPIDOGREL BISULFATE 75 MG/1
75 TABLET ORAL DAILY
Status: DISCONTINUED | OUTPATIENT
Start: 2024-03-06 | End: 2024-03-08 | Stop reason: HOSPADM

## 2024-03-06 RX ORDER — POLYETHYLENE GLYCOL 3350 17 G/17G
17 POWDER, FOR SOLUTION ORAL DAILY PRN
Status: DISCONTINUED | OUTPATIENT
Start: 2024-03-06 | End: 2024-03-08 | Stop reason: HOSPADM

## 2024-03-06 RX ORDER — ACETAMINOPHEN 325 MG/1
650 TABLET ORAL EVERY 6 HOURS PRN
Status: DISCONTINUED | OUTPATIENT
Start: 2024-03-06 | End: 2024-03-08 | Stop reason: HOSPADM

## 2024-03-06 RX ORDER — MAGNESIUM SULFATE 1 G/100ML
1000 INJECTION INTRAVENOUS PRN
Status: DISCONTINUED | OUTPATIENT
Start: 2024-03-06 | End: 2024-03-08 | Stop reason: HOSPADM

## 2024-03-06 RX ORDER — PANTOPRAZOLE SODIUM 40 MG/10ML
40 INJECTION, POWDER, LYOPHILIZED, FOR SOLUTION INTRAVENOUS 2 TIMES DAILY
Status: DISCONTINUED | OUTPATIENT
Start: 2024-03-06 | End: 2024-03-08 | Stop reason: HOSPADM

## 2024-03-06 RX ORDER — POTASSIUM CHLORIDE 750 MG/1
40 TABLET, EXTENDED RELEASE ORAL PRN
Status: DISCONTINUED | OUTPATIENT
Start: 2024-03-06 | End: 2024-03-08 | Stop reason: HOSPADM

## 2024-03-06 RX ORDER — ACETAMINOPHEN 650 MG/1
650 SUPPOSITORY RECTAL EVERY 6 HOURS PRN
Status: DISCONTINUED | OUTPATIENT
Start: 2024-03-06 | End: 2024-03-08 | Stop reason: HOSPADM

## 2024-03-06 RX ORDER — POTASSIUM CHLORIDE 7.45 MG/ML
10 INJECTION INTRAVENOUS PRN
Status: DISCONTINUED | OUTPATIENT
Start: 2024-03-06 | End: 2024-03-08 | Stop reason: HOSPADM

## 2024-03-06 RX ORDER — REGADENOSON 0.08 MG/ML
0.4 INJECTION, SOLUTION INTRAVENOUS
Status: COMPLETED | OUTPATIENT
Start: 2024-03-06 | End: 2024-03-07

## 2024-03-06 RX ORDER — ONDANSETRON 4 MG/1
4 TABLET, ORALLY DISINTEGRATING ORAL EVERY 8 HOURS PRN
Status: DISCONTINUED | OUTPATIENT
Start: 2024-03-06 | End: 2024-03-08 | Stop reason: HOSPADM

## 2024-03-06 RX ORDER — ONDANSETRON 2 MG/ML
4 INJECTION INTRAMUSCULAR; INTRAVENOUS EVERY 6 HOURS PRN
Status: DISCONTINUED | OUTPATIENT
Start: 2024-03-06 | End: 2024-03-08 | Stop reason: HOSPADM

## 2024-03-06 RX ORDER — MORPHINE SULFATE 4 MG/ML
4 INJECTION, SOLUTION INTRAMUSCULAR; INTRAVENOUS ONCE
Status: COMPLETED | OUTPATIENT
Start: 2024-03-06 | End: 2024-03-06

## 2024-03-06 RX ORDER — SODIUM CHLORIDE 0.9 % (FLUSH) 0.9 %
10 SYRINGE (ML) INJECTION PRN
Status: DISCONTINUED | OUTPATIENT
Start: 2024-03-06 | End: 2024-03-08 | Stop reason: HOSPADM

## 2024-03-06 RX ORDER — OXYCODONE HYDROCHLORIDE AND ACETAMINOPHEN 5; 325 MG/1; MG/1
1 TABLET ORAL EVERY 4 HOURS PRN
Status: DISCONTINUED | OUTPATIENT
Start: 2024-03-06 | End: 2024-03-08 | Stop reason: HOSPADM

## 2024-03-06 RX ORDER — ASPIRIN 81 MG/1
81 TABLET ORAL NIGHTLY
Status: DISCONTINUED | OUTPATIENT
Start: 2024-03-06 | End: 2024-03-08 | Stop reason: HOSPADM

## 2024-03-06 RX ORDER — ONDANSETRON 2 MG/ML
4 INJECTION INTRAMUSCULAR; INTRAVENOUS ONCE
Status: COMPLETED | OUTPATIENT
Start: 2024-03-06 | End: 2024-03-06

## 2024-03-06 RX ORDER — SODIUM CHLORIDE 0.9 % (FLUSH) 0.9 %
5-40 SYRINGE (ML) INJECTION EVERY 12 HOURS SCHEDULED
Status: DISCONTINUED | OUTPATIENT
Start: 2024-03-06 | End: 2024-03-08 | Stop reason: HOSPADM

## 2024-03-06 RX ORDER — ASPIRIN 81 MG/1
81 TABLET, CHEWABLE ORAL DAILY
Status: DISCONTINUED | OUTPATIENT
Start: 2024-03-07 | End: 2024-03-06

## 2024-03-06 RX ORDER — NITROGLYCERIN 0.4 MG/1
0.4 TABLET SUBLINGUAL EVERY 5 MIN PRN
Status: DISCONTINUED | OUTPATIENT
Start: 2024-03-06 | End: 2024-03-08 | Stop reason: HOSPADM

## 2024-03-06 RX ORDER — SODIUM CHLORIDE 9 MG/ML
INJECTION, SOLUTION INTRAVENOUS PRN
Status: DISCONTINUED | OUTPATIENT
Start: 2024-03-06 | End: 2024-03-08 | Stop reason: HOSPADM

## 2024-03-06 RX ORDER — OXYCODONE HYDROCHLORIDE AND ACETAMINOPHEN 5; 325 MG/1; MG/1
1 TABLET ORAL ONCE
Status: COMPLETED | OUTPATIENT
Start: 2024-03-06 | End: 2024-03-06

## 2024-03-06 RX ORDER — METOPROLOL TARTRATE 50 MG/1
50 TABLET, FILM COATED ORAL 2 TIMES DAILY
Status: DISCONTINUED | OUTPATIENT
Start: 2024-03-06 | End: 2024-03-08

## 2024-03-06 RX ORDER — ATORVASTATIN CALCIUM 40 MG/1
40 TABLET, FILM COATED ORAL NIGHTLY
Status: DISCONTINUED | OUTPATIENT
Start: 2024-03-06 | End: 2024-03-08 | Stop reason: HOSPADM

## 2024-03-06 RX ADMIN — OXYCODONE AND ACETAMINOPHEN 1 TABLET: 5; 325 TABLET ORAL at 16:30

## 2024-03-06 RX ADMIN — CLOPIDOGREL BISULFATE 75 MG: 75 TABLET ORAL at 12:31

## 2024-03-06 RX ADMIN — ENOXAPARIN SODIUM 40 MG: 100 INJECTION SUBCUTANEOUS at 12:31

## 2024-03-06 RX ADMIN — PANTOPRAZOLE SODIUM 40 MG: 40 INJECTION, POWDER, FOR SOLUTION INTRAVENOUS at 20:31

## 2024-03-06 RX ADMIN — OXYCODONE AND ACETAMINOPHEN 1 TABLET: 5; 325 TABLET ORAL at 20:30

## 2024-03-06 RX ADMIN — DESMOPRESSIN ACETATE 40 MG: 0.2 TABLET ORAL at 20:31

## 2024-03-06 RX ADMIN — Medication 10 ML: at 20:32

## 2024-03-06 RX ADMIN — ONDANSETRON 4 MG: 2 INJECTION INTRAMUSCULAR; INTRAVENOUS at 07:43

## 2024-03-06 RX ADMIN — OXYCODONE AND ACETAMINOPHEN 1 TABLET: 5; 325 TABLET ORAL at 10:10

## 2024-03-06 RX ADMIN — METOPROLOL TARTRATE 50 MG: 50 TABLET, FILM COATED ORAL at 12:31

## 2024-03-06 RX ADMIN — METOPROLOL TARTRATE 50 MG: 50 TABLET, FILM COATED ORAL at 20:31

## 2024-03-06 RX ADMIN — MORPHINE SULFATE 4 MG: 4 INJECTION, SOLUTION INTRAMUSCULAR; INTRAVENOUS at 07:44

## 2024-03-06 RX ADMIN — ASPIRIN 81 MG: 81 TABLET, COATED ORAL at 20:31

## 2024-03-06 RX ADMIN — IOPAMIDOL 75 ML: 755 INJECTION, SOLUTION INTRAVENOUS at 08:07

## 2024-03-06 RX ADMIN — PANTOPRAZOLE SODIUM 40 MG: 40 INJECTION, POWDER, FOR SOLUTION INTRAVENOUS at 13:21

## 2024-03-06 ASSESSMENT — PAIN DESCRIPTION - LOCATION
LOCATION: ABDOMEN

## 2024-03-06 ASSESSMENT — PAIN SCALES - GENERAL
PAINLEVEL_OUTOF10: 4
PAINLEVEL_OUTOF10: 8
PAINLEVEL_OUTOF10: 2
PAINLEVEL_OUTOF10: 6
PAINLEVEL_OUTOF10: 5
PAINLEVEL_OUTOF10: 7

## 2024-03-06 ASSESSMENT — PAIN DESCRIPTION - ORIENTATION
ORIENTATION: LEFT;UPPER
ORIENTATION: LEFT
ORIENTATION: LEFT;UPPER

## 2024-03-06 ASSESSMENT — PAIN DESCRIPTION - PAIN TYPE: TYPE: ACUTE PAIN

## 2024-03-06 ASSESSMENT — PAIN DESCRIPTION - DESCRIPTORS
DESCRIPTORS: ACHING
DESCRIPTORS: SHARP;CRAMPING
DESCRIPTORS: ACHING

## 2024-03-06 ASSESSMENT — PAIN DESCRIPTION - DIRECTION: RADIATING_TOWARDS: BACK

## 2024-03-06 ASSESSMENT — PAIN DESCRIPTION - ONSET: ONSET: ON-GOING

## 2024-03-06 ASSESSMENT — PAIN DESCRIPTION - FREQUENCY: FREQUENCY: CONTINUOUS

## 2024-03-06 ASSESSMENT — PAIN SCALES - WONG BAKER: WONGBAKER_NUMERICALRESPONSE: 0

## 2024-03-06 NOTE — CONSULTS
Gastroenterology Consult Note    Patient:   Daniel Stevenson Jr.   :    1955   Facility:   [facility]  Referring/PCP: Дмитрий Higgins, APRN - CNP  Date:     3/6/2024  Consultant:   JANET Underwood CNP      Chief Complaint   Patient presents with    Chest Pain    Abdominal Pain     Patient brought to the ER via EMS from home with complaint of left sided chest pain and SOB. Pain worse with inspiration. Pain has chronic LUQ abdominal pain but new chest pain started approximately 1 hour ago. Patient took 324 mg Aspirin        History of Present illness   Pt. Is a 69 yo male with pmx of CAD s/p CABG, PVD s/p fem bypass, CHF, blindness, HTN, and HLD who presented to ED 3/6 with c/o abdominal pain. He reports LUQ pain with radiation to chest that began to worsen several days ago. He reports left sided abdominal pain is chronic. He reports pain is exacerbated with eating. He denies acid reflux, indigestion, dysphagia, nausea, vomiting, diarrhea, melena, sick contacts, fever, and chills. He reports occasional constipation. He has poor appetite the last two days due to pain. CT with cholelithiasis. He has never had a colonoscopy before. He takes PPI daily. He is prescribed plavix, ASA, and statin. He reports he stopped smoking about fiver years ago.     Past Medical History:   Diagnosis Date    Blind in both eyes     legally blind - has minimal peripheral vision right eye    CAD (coronary artery disease)     Constipation     Histoplasmosis     Hyperlipidemia     Hypertension     PVD (peripheral vascular disease) (HCC)      Past Surgical History:   Procedure Laterality Date    CORONARY ARTERY BYPASS GRAFT N/A 2021    CORONARY ARTERY BYPASS GRAFTING X3 , INTERNAL MAMMARY ARTERY, SAPHENOUS VEIN GRAFT, ON PUMP WITH LEFT ATRIAL APPENDAGE CLIP  PLACEMENT AND PECTORIALIS NERVE BLOCK performed by Marian [facility] MD at [facility]    EYE SURGERY Bilateral     general anesthesia x1

## 2024-03-06 NOTE — PROGRESS NOTES
Admission to ICU from ER. Med/surge status.     A&O x4, legally blind. Currently SR. BP stable. On 2L O2, required after morphine administration. Chest pain improved- pending cardiology consult. Still complaining of LUQ abdominal pain 4/10- GI consult. Regular diet. Voiding via urinal. Repositions self in bed. Fall precautions in place. No further needs known at this time.     Electronically signed by Fern Monterroso RN on 3/6/2024 at 12:57 PM

## 2024-03-06 NOTE — ED TRIAGE NOTES
Patient brought to the ER via EMS from home with complaint of left sided chest pain and SOB. Pain worse with inspiration. Pain has chronic LUQ abdominal pain but new chest pain started approximately 1 hour ago. Patient took 324 mg Aspirin

## 2024-03-06 NOTE — CONSULTS
[facility]   CONSULTATION  [phone]        Reason for Consultation/Chief Complaint: \"I have been having pain left side of chest and left upper part of abdomen..\"    History of Present Illness:  Daniel Stevenson Jr. is a 68 y.o. patient who presented to the hospital with complaints of pain as mentioned above. He describes this as intermittent but not related to exertion. Duration of pain is about  15 min. It started about a week ago.  It is not related to food and goes away by itself. He has shortness of breath with it and says his BP goes up when he has pain. No nausea sweating palpitations or dizziness.  About 2 yrs ago he had CABG at [facility]. He is not following with cardiology but has been taking all his meds including DAPT metoprolol and statin.   I have been asked to provide consultation regarding further management and testing.      Past Medical History:   has a past medical history of Blind in both eyes, CAD (coronary artery disease), Constipation, Histoplasmosis, Hyperlipidemia, Hypertension, and PVD (peripheral vascular disease) (HCC).    Surgical History:   has a past surgical history that includes eye surgery (Bilateral); Femoral-femoral Bypass Graft (Bilateral, 6/28/2021); vascular surgery; and Coronary artery bypass graft (N/A, 8/17/2021).     Social History:   reports that he quit smoking about 2 years ago. His smoking use included cigarettes. He has never used smokeless tobacco. He reports that he does not currently use alcohol. He reports that he does not use drugs.     Family History:  family history includes Alzheimer's Disease in his mother; Dementia in his father.     Home Medications:  Were reviewed and are listed in nursing record. and/or listed below  Prior to Admission medications    Medication Sig Start Date End Date Taking? Authorizing Provider   pantoprazole sodium (PROTONIX) 40 MG PACK packet Take 1 packet by mouth every morning (before breakfast)    Provider,

## 2024-03-06 NOTE — ED PROVIDER NOTES
Emergency Department Provider Note  Location: [facility] [place] [place] ICU  3/6/2024     Patient Identification  Daniel Stevenson Jr. is a 68 y.o. male    Chief Complaint  Chest Pain and Abdominal Pain (Patient brought to the ER via EMS from home with complaint of left sided chest pain and SOB. Pain worse with inspiration. Pain has chronic LUQ abdominal pain but new chest pain started approximately 1 hour ago. Patient took 324 mg Aspirin)      Mode of Arrival  EMS    HPI  (History provided by patient)  This is a 68 y.o. male with a PMH significant for TIA, hypertension, peripheral vascular disease, CHF, with three-vessel CAD  presented today for chest pain.  Patient reports that chest pain started sometime this morning.  Chest pain was central nonradiating and occurred at rest with no alleviating or aggravating factors.  Patient reports that he felt hot and sweaty with the episode.  He reports feeling more short of breath than normal.  He has a nonproductive cough.  He denies any fever.  Denies any nausea or vomiting.  He is endorsing left upper quadrant abdominal pain.  He denies any diarrhea or bloody stool.  Denies any dysuria, hematuria or change in frequency.  EMS gave him a full dose aspirin.  He states that his chest pain has resolved    ROS  Review of Systems   All other systems reviewed and are negative.        I have reviewed the following nursing documentation:  Allergies: No Known Allergies    Past medical history:  has a past medical history of Blind in both eyes, CAD (coronary artery disease), Constipation, Histoplasmosis, Hyperlipidemia, Hypertension, and PVD (peripheral vascular disease) (HCC).    Past surgical history:  has a past surgical history that includes eye surgery (Bilateral); Femoral-femoral Bypass Graft (Bilateral, 6/28/2021); vascular surgery; and Coronary artery bypass graft (N/A, 8/17/2021).    Home medications:   Prior to Admission medications    Medication Sig Start Date End Date Taking? Authorizing

## 2024-03-06 NOTE — PROGRESS NOTES
Consults called to cardiology and GI. Electronically signed by Fern Monterroso RN on 3/6/2024 at 1:05 PM

## 2024-03-06 NOTE — H&P
[facility] Medicine History & Physical      PCP: Дмитрий Higgins, APRN - CNP    Date of Admission: 3/6/2024    Date of Service: Pt seen/examined on 03/06/24     Chief Complaint:    Chief Complaint   Patient presents with    Chest Pain    Abdominal Pain     Patient brought to the ER via EMS from home with complaint of left sided chest pain and SOB. Pain worse with inspiration. Pain has chronic LUQ abdominal pain but new chest pain started approximately 1 hour ago. Patient took 324 mg Aspirin         History Of Present Illness:      The patient is a 68 y.o. male with PMH of legally blind, histoplasmosis, CAD s/p CABG, HTN, and CHF who presented to [facility] ED with complaint of chest pain and abdominal pain. Pt states that he has had abdominal pain for the last year intermittently but within the couple of days the pain has continued to increase. He states that he has been referred to GI by his PCP but has not yet seen them. He states that this pain is unaffected by food. He states that he started having chest pain this morning with SOB and diaphoresis.    Past Medical History:        Diagnosis Date    Blind in both eyes     legally blind - has minimal peripheral vision right eye    CAD (coronary artery disease)     Constipation     Histoplasmosis     Hyperlipidemia     Hypertension     PVD (peripheral vascular disease) ([facility])        Past Surgical History:        Procedure Laterality Date    CORONARY ARTERY BYPASS GRAFT N/A 8/17/2021    CORONARY ARTERY BYPASS GRAFTING X3 , INTERNAL MAMMARY ARTERY, SAPHENOUS VEIN GRAFT, ON PUMP WITH LEFT ATRIAL APPENDAGE CLIP  PLACEMENT AND PECTORIALIS NERVE BLOCK performed by Marian Dong MD at [facility]    EYE SURGERY Bilateral     general anesthesia x1     FEMORAL-FEMORAL BYPASS GRAFT Bilateral 6/28/2021    FEMORAL TO FEMORAL BYPASS GRAFT WITH RIGHT LOWER EXTREMITY ANGIOGRAM performed by De Lee MD at [facility] OR    VASCULAR SURGERY         Medications Prior to Admission:    Prior to  40-45%.   Global left ventricular hypokinesis.   Grade I diastolic dysfunction with normal left ventricular filling pressure.   Normal left ventricular size and wall thickness.   No significant valvular heart disease.      EKG:   ordered    RADIOLOGY  CT ABDOMEN PELVIS W IV CONTRAST Additional Contrast? None   Final Result   1. Cholelithiasis with questionable gallbladder wall thickening but no   pericholecystic fluid.   2. 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   3. 2 mm nonobstructing left renal calculus         CT CHEST PULMONARY EMBOLISM W CONTRAST   Preliminary Result   No evidence of pulmonary embolism or acute pulmonary abnormality.      Cholelithiasis without evidence of acute cholecystitis.         XR CHEST PORTABLE   Preliminary Result   1. Stable mild cardiomegaly, with mild pulmonary vascular congestion, though   no evidence of interstitial edema or overt CHF.   2. No acute airspace consolidation.             ASSESSMENT/PLAN:  Chest pain  CAD s/p 3V CABG 8/17/21  - on statin, asa, Plavix, and lopressor  - EKG without any ischemic changes   - Chest x-ray: stable mild cardiomegaly, with mild pulmonary vascular congestion, though no evidence of interstitial edema or overt CHF   - Trend troponins: Negative x2, repeat ordered   - PRN symptom control   - cardiology consulted     Acute Respiratory Failure w/Hypoxia   - likely 2/2 pain medication  - O2 sats 87% on RA, pt not on O2 at baseline   - tachypnea, dyspnea   - supp O2, wean as tolerated, currently on 2L    LUQ abd pain  - CT abd/pelvis with cholelithiasis   - prn pain control and antiemetics   - IV PPI  - GI consulted    HTN  - on lopressor  - monitor BP    Chronic combined CHF  Ischemic cardiomyopathy   - last echo on 6/11/21 with EF of 40-45% and grade I diastolic dysfunction  - monitor daily weights and I/Os    Hx of right iliac occlusion s/p L-R fem-fem bypass 6/2021  - on asa, statin, and Plavix    Hx of

## 2024-03-06 NOTE — PROGRESS NOTES
Pt arrived ICU room #8 from ER via stretcher. Pt awake, alert & oriented x4. New orders released. Pt hooked up to ICU monitoring devices VSS HR 90 NSR o2 99% 2 LNC RR 20 /84    Pt has vision deficit- sees best on R side, shadows only.   PIV wnl. Bed alarm on, call light in reach.     4 Eyes Skin Assessment     NAME:  Daniel Stevenson Jr.  YOB: 1955  MEDICAL RECORD NUMBER:  [id number]    The patient is being assessed for  Admission    I agree that at least one RN has performed a thorough Head to Toe Skin Assessment on the patient. ALL assessment sites listed below have been assessed.      Areas assessed by both nurses:    Head, Face, Ears, Shoulders, Back, Chest, Arms, Elbows, Hands, Sacrum. Buttock, Coccyx, Ischium, Legs. Feet and Heels, and Under Medical Devices         Does the Patient have a Wound? No noted wound(s)       Leobardo Prevention initiated by RN: Yes  Wound Care Orders initiated by RN: No    Pressure Injury (Stage 3,4, Unstageable, DTI, NWPT, and Complex wounds) if present, place Wound referral order by RN under : No    New Ostomies, if present place, Ostomy referral order under : No     Nurse 1 eSignature: Electronically signed by Rina Cortes RN on 3/6/24 at 12:08 PM EST    **SHARE this note so that the co-signing nurse can place an eSignature**    Nurse 2 eSignature: Electronically signed by Fern Monterroso RN on 3/6/24 at 12:54 PM EST

## 2024-03-07 ENCOUNTER — TELEPHONE (OUTPATIENT)
Dept: CARDIOLOGY CLINIC | Age: 69
End: 2024-03-07

## 2024-03-07 ENCOUNTER — APPOINTMENT (OUTPATIENT)
Dept: NUCLEAR MEDICINE | Age: 69
DRG: 313 | End: 2024-03-07
Payer: MEDICARE

## 2024-03-07 PROBLEM — K80.20 CHOLELITHIASIS: Status: ACTIVE | Noted: 2024-03-07

## 2024-03-07 LAB
ANION GAP SERPL CALCULATED.3IONS-SCNC: 12 MMOL/L (ref 3–16)
BASOPHILS # BLD: 0.1 K/UL (ref 0–0.2)
BASOPHILS NFR BLD: 0.9 %
BUN SERPL-MCNC: 9 MG/DL (ref 7–20)
CALCIUM SERPL-MCNC: 8.8 MG/DL (ref 8.3–10.6)
CHLORIDE SERPL-SCNC: 98 MMOL/L (ref 99–110)
CHOLEST SERPL-MCNC: 154 MG/DL (ref 0–199)
CO2 SERPL-SCNC: 23 MMOL/L (ref 21–32)
CREAT SERPL-MCNC: 0.7 MG/DL (ref 0.8–1.3)
DEPRECATED RDW RBC AUTO: 13.6 % (ref 12.4–15.4)
EOSINOPHIL # BLD: 0.1 K/UL (ref 0–0.6)
EOSINOPHIL NFR BLD: 0.6 %
GFR SERPLBLD CREATININE-BSD FMLA CKD-EPI: >60 ML/MIN/{1.73_M2}
GLUCOSE BLD-MCNC: 115 MG/DL (ref 70–99)
GLUCOSE SERPL-MCNC: 113 MG/DL (ref 70–99)
HCT VFR BLD AUTO: 43.8 % (ref 40.5–52.5)
HDLC SERPL-MCNC: 52 MG/DL (ref 40–60)
HGB BLD-MCNC: 15.2 G/DL (ref 13.5–17.5)
LDLC SERPL CALC-MCNC: 82 MG/DL
LYMPHOCYTES # BLD: 1.8 K/UL (ref 1–5.1)
LYMPHOCYTES NFR BLD: 19.5 %
MCH RBC QN AUTO: 33.7 PG (ref 26–34)
MCHC RBC AUTO-ENTMCNC: 34.7 G/DL (ref 31–36)
MCV RBC AUTO: 97.2 FL (ref 80–100)
MONOCYTES # BLD: 1 K/UL (ref 0–1.3)
MONOCYTES NFR BLD: 11 %
NEUTROPHILS # BLD: 6.3 K/UL (ref 1.7–7.7)
NEUTROPHILS NFR BLD: 68 %
PERFORMED ON: ABNORMAL
PLATELET # BLD AUTO: 200 K/UL (ref 135–450)
PMV BLD AUTO: 6.7 FL (ref 5–10.5)
POTASSIUM SERPL-SCNC: 4.1 MMOL/L (ref 3.5–5.1)
RBC # BLD AUTO: 4.5 M/UL (ref 4.2–5.9)
SODIUM SERPL-SCNC: 133 MMOL/L (ref 136–145)
TRIGL SERPL-MCNC: 99 MG/DL (ref 0–150)
VLDLC SERPL CALC-MCNC: 20 MG/DL
WBC # BLD AUTO: 9.2 K/UL (ref 4–11)

## 2024-03-07 PROCEDURE — 96376 TX/PRO/DX INJ SAME DRUG ADON: CPT

## 2024-03-07 PROCEDURE — 93017 CV STRESS TEST TRACING ONLY: CPT

## 2024-03-07 PROCEDURE — 36415 COLL VENOUS BLD VENIPUNCTURE: CPT

## 2024-03-07 PROCEDURE — 3430000000 HC RX DIAGNOSTIC RADIOPHARMACEUTICAL: Performed by: INTERNAL MEDICINE

## 2024-03-07 PROCEDURE — 6360000002 HC RX W HCPCS

## 2024-03-07 PROCEDURE — 2580000003 HC RX 258

## 2024-03-07 PROCEDURE — 78452 HT MUSCLE IMAGE SPECT MULT: CPT

## 2024-03-07 PROCEDURE — 99232 SBSQ HOSP IP/OBS MODERATE 35: CPT | Performed by: INTERNAL MEDICINE

## 2024-03-07 PROCEDURE — G0378 HOSPITAL OBSERVATION PER HR: HCPCS

## 2024-03-07 PROCEDURE — 99223 1ST HOSP IP/OBS HIGH 75: CPT | Performed by: INTERNAL MEDICINE

## 2024-03-07 PROCEDURE — G0378 HOSPITAL OBSERVATION PER HR: HCPCS | Performed by: INTERNAL MEDICINE

## 2024-03-07 PROCEDURE — 6370000000 HC RX 637 (ALT 250 FOR IP): Performed by: INTERNAL MEDICINE

## 2024-03-07 PROCEDURE — 6370000000 HC RX 637 (ALT 250 FOR IP)

## 2024-03-07 PROCEDURE — 80061 LIPID PANEL: CPT

## 2024-03-07 PROCEDURE — 85025 COMPLETE CBC W/AUTO DIFF WBC: CPT

## 2024-03-07 PROCEDURE — 6360000002 HC RX W HCPCS: Performed by: INTERNAL MEDICINE

## 2024-03-07 PROCEDURE — 80048 BASIC METABOLIC PNL TOTAL CA: CPT

## 2024-03-07 PROCEDURE — A9502 TC99M TETROFOSMIN: HCPCS | Performed by: INTERNAL MEDICINE

## 2024-03-07 PROCEDURE — C9113 INJ PANTOPRAZOLE SODIUM, VIA: HCPCS

## 2024-03-07 RX ORDER — SENNOSIDES A AND B 8.6 MG/1
1 TABLET, FILM COATED ORAL 2 TIMES DAILY
Status: DISCONTINUED | OUTPATIENT
Start: 2024-03-07 | End: 2024-03-08 | Stop reason: HOSPADM

## 2024-03-07 RX ORDER — POLYETHYLENE GLYCOL 3350 17 G/17G
17 POWDER, FOR SOLUTION ORAL 2 TIMES DAILY
Status: DISCONTINUED | OUTPATIENT
Start: 2024-03-07 | End: 2024-03-08 | Stop reason: HOSPADM

## 2024-03-07 RX ADMIN — REGADENOSON 0.4 MG: 0.08 INJECTION, SOLUTION INTRAVENOUS at 14:00

## 2024-03-07 RX ADMIN — TETROFOSMIN 32 MILLICURIE: 1.38 INJECTION, POWDER, LYOPHILIZED, FOR SOLUTION INTRAVENOUS at 14:00

## 2024-03-07 RX ADMIN — OXYCODONE AND ACETAMINOPHEN 1 TABLET: 5; 325 TABLET ORAL at 09:28

## 2024-03-07 RX ADMIN — Medication 10 ML: at 20:25

## 2024-03-07 RX ADMIN — SENNOSIDES 8.6 MG: 8.6 TABLET, FILM COATED ORAL at 20:24

## 2024-03-07 RX ADMIN — METOPROLOL TARTRATE 50 MG: 50 TABLET, FILM COATED ORAL at 16:03

## 2024-03-07 RX ADMIN — DESMOPRESSIN ACETATE 40 MG: 0.2 TABLET ORAL at 20:24

## 2024-03-07 RX ADMIN — METOPROLOL TARTRATE 50 MG: 50 TABLET, FILM COATED ORAL at 20:24

## 2024-03-07 RX ADMIN — PANTOPRAZOLE SODIUM 40 MG: 40 INJECTION, POWDER, FOR SOLUTION INTRAVENOUS at 09:28

## 2024-03-07 RX ADMIN — POLYETHYLENE GLYCOL (3350) 17 G: 17 POWDER, FOR SOLUTION ORAL at 16:03

## 2024-03-07 RX ADMIN — TETROFOSMIN 11.6 MILLICURIE: 1.38 INJECTION, POWDER, LYOPHILIZED, FOR SOLUTION INTRAVENOUS at 11:30

## 2024-03-07 RX ADMIN — Medication 10 ML: at 08:09

## 2024-03-07 RX ADMIN — PANTOPRAZOLE SODIUM 40 MG: 40 INJECTION, POWDER, FOR SOLUTION INTRAVENOUS at 20:24

## 2024-03-07 RX ADMIN — MUPIROCIN: 20 OINTMENT TOPICAL at 20:24

## 2024-03-07 RX ADMIN — OXYCODONE AND ACETAMINOPHEN 1 TABLET: 5; 325 TABLET ORAL at 01:42

## 2024-03-07 RX ADMIN — SENNOSIDES 8.6 MG: 8.6 TABLET, FILM COATED ORAL at 16:03

## 2024-03-07 RX ADMIN — ASPIRIN 81 MG: 81 TABLET, COATED ORAL at 20:24

## 2024-03-07 RX ADMIN — MUPIROCIN: 20 OINTMENT TOPICAL at 09:28

## 2024-03-07 RX ADMIN — POLYETHYLENE GLYCOL (3350) 17 G: 17 POWDER, FOR SOLUTION ORAL at 20:24

## 2024-03-07 RX ADMIN — CLOPIDOGREL BISULFATE 75 MG: 75 TABLET ORAL at 09:28

## 2024-03-07 ASSESSMENT — PAIN DESCRIPTION - DESCRIPTORS
DESCRIPTORS: ACHING
DESCRIPTORS: SPASM

## 2024-03-07 ASSESSMENT — PAIN SCALES - GENERAL
PAINLEVEL_OUTOF10: 5
PAINLEVEL_OUTOF10: 0
PAINLEVEL_OUTOF10: 2
PAINLEVEL_OUTOF10: 2
PAINLEVEL_OUTOF10: 0
PAINLEVEL_OUTOF10: 4
PAINLEVEL_OUTOF10: 2
PAINLEVEL_OUTOF10: 2

## 2024-03-07 ASSESSMENT — PAIN SCALES - WONG BAKER
WONGBAKER_NUMERICALRESPONSE: 0

## 2024-03-07 ASSESSMENT — PAIN DESCRIPTION - LOCATION
LOCATION: ABDOMEN
LOCATION: ABDOMEN

## 2024-03-07 ASSESSMENT — PAIN DESCRIPTION - ORIENTATION
ORIENTATION: LEFT;UPPER
ORIENTATION: LEFT

## 2024-03-07 NOTE — PROGRESS NOTES
combined CHF  Ischemic cardiomyopathy   - last echo on 6/11/21 with EF of 40-45% and grade I diastolic dysfunction  - appears compensated   - monitor daily weights and I/Os     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  - Complicating assessment and treatment. Placing patient at risk for multiple co-morbidities as well as early death and contributing to the patient's presentation.   - Counseled on weight loss.      Note chest pain makes the patient higher risk for morbidity and mortality requiring testing and treatment.     DVT Prophylaxis: Lovenox   Diet: ADULT DIET; Regular; No Caffeine  Code Status: Full Code    Dc planning once stress test reported    Louis Cain MD, 3/7/2024 7:42 AM

## 2024-03-07 NOTE — PROGRESS NOTES
Shift assessment completed, see flow sheet.     Pt is awake A/O lying in bed.  Pt denies any ABD pain or CP.  Pt denies any N/V or diarrhea.    Pt now c/o pain 4/10 in ABD states rolling in bed increases pain.  Pt request prn for pain.     SpO2 95%. Respirations are easy, even, and unlabored.   Bilateral lung sounds clear.     VSS  NSR on the monitor        PIV, WNL flushed well    All lines and monitoring devices in place. Bed in lowest position with wheels locked. No needs expressed at this time. Will continue to monitor.

## 2024-03-07 NOTE — CONSULTS
Reason for referral and CC: LUQ pain and CP    HISTORY OF PRESENT ILLNESS: 69 yo with history of coronary disease, blindness from histoplasmosis, CAD status post CABG, CHF presented with left upper quadrant pain as well as retrosternal pain.  He ruled out for acute MI.  He notes that he has had some chronic stomach pain.  Not worse with food.  Shortness of breath has now resolved      Past Medical History:   Diagnosis Date    Blind in both eyes     legally blind - has minimal peripheral vision right eye    CAD (coronary artery disease)     Constipation     Histoplasmosis     Hyperlipidemia     Hypertension     PVD (peripheral vascular disease) ([facility])      Past Surgical History:   Procedure Laterality Date    CORONARY ARTERY BYPASS GRAFT N/A 8/17/2021    CORONARY ARTERY BYPASS GRAFTING X3 , INTERNAL MAMMARY ARTERY, SAPHENOUS VEIN GRAFT, ON PUMP WITH LEFT ATRIAL APPENDAGE CLIP  PLACEMENT AND PECTORIALIS NERVE BLOCK performed by Marian Dong MD at [facility]    EYE SURGERY Bilateral     general anesthesia x1     FEMORAL-FEMORAL BYPASS GRAFT Bilateral 6/28/2021    FEMORAL TO FEMORAL BYPASS GRAFT WITH RIGHT LOWER EXTREMITY ANGIOGRAM performed by De Lee MD at [facility] OR    VASCULAR SURGERY       Family History  family history includes Alzheimer's Disease in his mother; Dementia in his father.    Social History:  reports that he quit smoking about 2 years ago. His smoking use included cigarettes. He has never used smokeless tobacco.   reports that he does not currently use alcohol.    ALLERGIES:  Patient has No Known Allergies.  Continuous Infusions:   sodium chloride       Scheduled Meds:   mupirocin   Each Nostril BID    atorvastatin  40 mg Oral Nightly    clopidogrel  75 mg Oral Daily    metoprolol tartrate  50 mg Oral BID    sodium chloride flush  5-40 mL IntraVENous 2 times per day    enoxaparin  40 mg SubCUTAneous Daily    aspirin  81 mg Oral Nightly    pantoprazole  40 mg IntraVENous BID     PRN Meds:  sodium

## 2024-03-07 NOTE — TELEPHONE ENCOUNTER
Sent the following page to [employer] phone:     Daniel Stevenson Jr [birth year], Bed- ICU 8, RN- Liz P- [phone], Reason- Would like RK to review stress test because pt is pending discharge

## 2024-03-07 NOTE — ACP (ADVANCE CARE PLANNING)
Advance Care Planning     General Advance Care Planning (ACP) Conversation    Date of Conversation: 3/7/2024  Conducted with: Patient with Decision Making Capacity    The patient's spouse can speak for the patient if he cannot speak for himself.     Healthcare Decision Maker:    Primary Decision Maker: Brooklynn Stevenson - Spouse - [phone]    Secondary Decision Maker: KRISTIN STEVENSON - Child - [phone]  Click here to complete Healthcare Decision Makers including selection of the Healthcare Decision Maker Relationship (ie \"Primary\").   Today we documented Decision Maker(s) consistent with Legal Next of Kin hierarchy.    Content/Action Overview:  DECLINED ACP Conversation - will revisit periodically  Reviewed DNR/DNI and patient elects Full Code (Attempt Resuscitation)        Length of Voluntary ACP Conversation in minutes:  <16 minutes (Non-Billable)    Teresa Boeck, RN

## 2024-03-07 NOTE — PROGRESS NOTES
Patient arrived to stress lab for lexiscan stress test.  Patient was educated on procedure, all questions answered, and consent verified/obtained.

## 2024-03-07 NOTE — PROGRESS NOTES
PROGRESS NOTE  S:68 yrs Patient  admitted on 3/6/2024 with Shortness of breath [R06.02]  Chest pain [R07.9]  Hx of CABG [Z95.1]  Chest pain, unspecified type [R07.9] .  Today, he reports LUQ pain is unchanged. He denies chest pain. He denies nausea. He has not eaten in three days due to pain. He denies BM.     Exam:   Vitals:    03/07/24 1000   BP:    Pulse: (!) 101   Resp:    Temp:    SpO2: 97%   Generalized: alert, no acute distress  HEENT: sclera clear, anicteric  Neck: supple, trachea midline  Heart NSR  Abdomen: soft, TTP LUQ, ND  Extremities: no edema     Medications: Reviewed    Labs:  CBC:   Recent Labs     03/06/24  0712 03/07/24  0456   WBC 9.9 9.2   HGB 14.5 15.2   HCT 41.7 43.8   MCV 97.5 97.2    200     BMP:   Recent Labs     03/06/24  0712 03/07/24  0456    133*   K 3.9 4.1    98*   CO2 25 23   BUN 11 9   CREATININE 0.9 0.7*     Imaging  KUB,3/6  1. Moderate amount of stool in the colon.  Nonspecific bowel gas pattern with  several loops of air-filled bowel which are top normal in caliber.  2. Distended bladder with excreted contrast.  Attending Supervising Physician's Attestation Statement  The patient is a 68 y.o. male. I have performed a history and physical examination of the patient. I discussed the case with BECKY Blanc    I reviewed the patient's Past Medical History, Past Surgical History, Medications, and Allergies.     Physical Exam:  Vitals:    03/07/24 1500 03/07/24 1545 03/07/24 1600 03/07/24 1700   BP:  119/69 119/69    Pulse: (!) 112 (!) 113 (!) 106 (!) 114   Resp:  20     Temp:  98.2 °F (36.8 °C)     TempSrc:  Oral     SpO2: 95% 96% 93% 97%   Weight:       Height:           Physical Examination:   General - improved and well hydrated  Mental status - alert, oriented to person, place, and time  Eyes - sclera anicteric  Neck - supple, no significant adenopathy  Heart - normal rate and regular rhythm  Abdomen - soft, NT,  ND  Extremities - no pedal edema        Assessment   67 yo male with pmx of CAD s/p CABG, PVD s/p fem bypass, CHF, blindness, HTN, and HLD admitted with c/o LUQ abdominal pain likely secondary to gastritis, esophagitis, or PUD. CT imaging with esophageal wall thickening suggestive of esophagitis.      Plan   Continue supportive care  PPI BID  Await cardiac stress test today   Start miralax and senna BID as bowel regimen  Regular diet as tolerated   Cardiology consulted--EKG & troponins negative   Will plan for diagnostic EGD with cardiac clearance   Outpatient colonoscopy with Dr. Leela Blanc, APRN - CNP  10:38 AM 3/7/2024                      68 year old male with history of HTN, HLD, CAD s/p CABG, PVD s/p Fem bypass, CHF, and histoplasmosis admitted with acute on chronic LUQ pain and chest pain. Differential includes esophagitis (per CT), PUD, constipation and cardiac etiologies.     Continue supportive care. Continue PPI BID. KUB c/w constipation. Recommend bowel regimen with miralax and senna. Cardiac workup in progress. Will consider EGD after cardiac clearance. EGD can also be done as outpatient for workup of chronic LUQ pain    Brian Swenson MD          (O) [phone]  (O) [phone]

## 2024-03-07 NOTE — PLAN OF CARE
Problem: Discharge Planning  Goal: Discharge to home or other facility with appropriate resources  3/7/2024 0529 by Ally Galan, RN  Outcome: Progressing     Problem: Pain  Goal: Verbalizes/displays adequate comfort level or baseline comfort level  3/7/2024 1449 by Za Soto RN  Outcome: Progressing  3/7/2024 0529 by Ally Galan RN  Outcome: Progressing  Flowsheets  Taken 3/7/2024 0410  Verbalizes/displays adequate comfort level or baseline comfort level: Encourage patient to monitor pain and request assistance  Taken 3/7/2024 0005  Verbalizes/displays adequate comfort level or baseline comfort level:   Encourage patient to monitor pain and request assistance   Assess pain using appropriate pain scale  Taken 3/6/2024 2005  Verbalizes/displays adequate comfort level or baseline comfort level: Encourage patient to monitor pain and request assistance     Problem: Safety - Adult  Goal: Free from fall injury  Outcome: Progressing     Problem: Chronic Conditions and Co-morbidities  Goal: Patient's chronic conditions and co-morbidity symptoms are monitored and maintained or improved  Outcome: Progressing

## 2024-03-07 NOTE — CARE COORDINATION
03/07/24 1551   Service Assessment   Patient Orientation Alert and Oriented;Person;Place;Situation   Cognition Alert   History Provided By Patient   Primary Caregiver Self   Support Systems Spouse/Significant Other   Patient's Healthcare Decision Maker is: Legal Next of Kin   PCP Verified by CM Yes   Last Visit to PCP Within last 6 months   Prior Functional Level Independent in ADLs/IADLs   Current Functional Level Independent in ADLs/IADLs   Can patient return to prior living arrangement Yes   Ability to make needs known: Good   Family able to assist with home care needs: Yes   Would you like for me to discuss the discharge plan with any other family members/significant others, and if so, who? No   Financial Resources Medicare   Community Resources None   Discharge Planning   Type of Residence Trailer/Mobile Home   Living Arrangements Spouse/Significant Other   Current Services Prior To Admission None   Potential Assistance Needed N/A   DME Ordered? No   Potential Assistance Purchasing Medications No   Type of Home Care Services None   Patient expects to be discharged to: Trailer/mobile home   Follow Up Appointment: Best Day/Time    (spouse)   One/Two Story Residence One story   History of falls? 1  (missed step coming out of mobile home recently)     Case Management Assessment  Initial Evaluation    Date/Time of Evaluation: 3/7/2024 3:53 PM  Assessment Completed by: Teresa Boeck, RN    If patient is discharged prior to next notation, then this note serves as note for discharge by case management.    Patient Name: Daniel Stevenson Jr.                   YOB: 1955  Diagnosis: Shortness of breath [R06.02]  Chest pain [R07.9]  Hx of CABG [Z95.1]  Chest pain, unspecified type [R07.9]                   Date / Time: 3/6/2024  6:59 AM    Patient Admission Status: Observation   Readmission Risk (Low < 19, Mod (19-27), High > 27): No data recorded  Current PCP: Дмитрий Higgins, APRN - CNP  PCP verified by CM?

## 2024-03-07 NOTE — PLAN OF CARE
Problem: Discharge Planning  Goal: Discharge to home or other facility with appropriate resources  Outcome: Progressing     Problem: Pain  Goal: Verbalizes/displays adequate comfort level or baseline comfort level  Outcome: Progressing  Flowsheets  Taken 3/7/2024 0410  Verbalizes/displays adequate comfort level or baseline comfort level: Encourage patient to monitor pain and request assistance  Taken 3/7/2024 0005  Verbalizes/displays adequate comfort level or baseline comfort level:   Encourage patient to monitor pain and request assistance   Assess pain using appropriate pain scale  Taken 3/6/2024 2005  Verbalizes/displays adequate comfort level or baseline comfort level: Encourage patient to monitor pain and request assistance

## 2024-03-07 NOTE — PROGRESS NOTES
A lexiscan stress test was completed on this patient as ordered. The patient tolerated the procedure well, denied symptoms. Awaiting stress imaging at this time.

## 2024-03-07 NOTE — DISCHARGE INSTRUCTIONS
F/w GI in 1 week for EGD    F./w cardiology in 2 weeks        Heart Failure Resources:  Heart Failure Interactive Workbook:  Go to https://eziCONEX.M2G/publication/?e=146544 for a Free Heart Failure Interactive Workbook provided by The American [employer] Association. This interactive workbook will provide information on Healthier Living with Heart Failure. Please copy and paste link into search bar. Use your mouse to scroll through the pages.    HF [place] des:   Heart Failure Free smart phone des available for iPhone and Android download. Use your phone to track sodium intake, fluid intake, symptoms, and weight.     Low Sodium Diet / Recipes:  Go to www.[employer] website for “renal” diet which is Low Sodium! [employer] is a dialysis company, but this website offers free seasonal cookbooks. Each quarter, they will release 25-30 new recipes with a breakdown of calories, sodium, and glucose. You can also go to www.MESI/recipes website for free recipes.     Discharge Instruction Video:  Scan the QR code below with your camera and click the canva.com link to open the video and watch educational information on Heart Failure and Medications from one of our nurses.   https://www."[employer]/design/DAFZnsH_[email or web address]/edit    Home Exercise Program:   Identification of Green/Yellow/Red zones:  You should be able to identify when you feel good (green zone), if you have 1-2 symptoms of HF (yellow zone), or if you are in need of medical attention (red zone).  In your CHF education folder you were provided a “stop light tool” to outline this information.     We want to you to rate your exertion levels:    Our therapy team has discussed means of identification with you such as the \"Charleen scale.\"  The Charleen rating scale ranges from 6 to 20, where 6 means \"no exertion at all\" and 20 means \"maximal exertion.\" The goal is to use this to gauge how much effort it is taking for you to do your normal daily tasks.

## 2024-03-07 NOTE — PROGRESS NOTES
[facility] Daily Progress Note      Admit Date:  3/6/2024    Subjective:  Mr. Stevenson is seen for left upper quadrant abdominal pain.  No nausea no chest pain, shortness of breath, palpitations, sweating. No radiation of pain. Pain is very vague and may last hrs goes away by itself. No BM x3 days. No prior colonoscopy.  His description of pain keeps changing.       Reason for Consultation/Chief Complaint: \"I have been having pain left side of chest and left upper part of abdomen..\"     History of Present Illness:  Daniel Stevenson Jr. is a 68 y.o. patient who presented to the hospital with complaints of pain as mentioned above. He describes this as intermittent but not related to exertion. Duration of pain is about  15 min. It started about a week ago.  It is not related to food and goes away by itself. He has shortness of breath with it and says his BP goes up when he has pain. No nausea sweating palpitations or dizziness.  About 2 yrs ago he had CABG at [facility]. He is not following with cardiology but has been taking all his meds including DAPT metoprolol and statin.   I have been asked to provide consultation regarding further management and testing.     ROS:  12 point ROS negative in all areas as listed below except as in [place]  Constitutional, EENT, pulmonary, GI, , Musculoskeletal, skin, neurological, hematological, endocrine, Psychiatric    Past Medical History:   Diagnosis Date    Blind in both eyes     legally blind - has minimal peripheral vision right eye    CAD (coronary artery disease)     Constipation     Histoplasmosis     Hyperlipidemia     Hypertension     PVD (peripheral vascular disease) (HCC)      Past Surgical History:   Procedure Laterality Date    CORONARY ARTERY BYPASS GRAFT N/A 8/17/2021    CORONARY ARTERY BYPASS GRAFTING X3 , INTERNAL MAMMARY ARTERY, SAPHENOUS VEIN GRAFT, ON PUMP WITH LEFT ATRIAL APPENDAGE CLIP  PLACEMENT AND PECTORIALIS NERVE BLOCK performed by Marian WILDER

## 2024-03-08 VITALS
WEIGHT: 228 LBS | HEART RATE: 100 BPM | HEIGHT: 67 IN | DIASTOLIC BLOOD PRESSURE: 69 MMHG | RESPIRATION RATE: 14 BRPM | OXYGEN SATURATION: 94 % | TEMPERATURE: 98 F | BODY MASS INDEX: 35.79 KG/M2 | SYSTOLIC BLOOD PRESSURE: 119 MMHG

## 2024-03-08 PROBLEM — I20.89 ANGINA AT REST: Status: ACTIVE | Noted: 2024-03-08

## 2024-03-08 LAB
ANION GAP SERPL CALCULATED.3IONS-SCNC: 11 MMOL/L (ref 3–16)
BASOPHILS # BLD: 0.1 K/UL (ref 0–0.2)
BASOPHILS NFR BLD: 1.2 %
BUN SERPL-MCNC: 14 MG/DL (ref 7–20)
CALCIUM SERPL-MCNC: 9.3 MG/DL (ref 8.3–10.6)
CHLORIDE SERPL-SCNC: 101 MMOL/L (ref 99–110)
CO2 SERPL-SCNC: 24 MMOL/L (ref 21–32)
CREAT SERPL-MCNC: 0.8 MG/DL (ref 0.8–1.3)
DEPRECATED RDW RBC AUTO: 13.4 % (ref 12.4–15.4)
EOSINOPHIL # BLD: 0.1 K/UL (ref 0–0.6)
EOSINOPHIL NFR BLD: 1 %
GFR SERPLBLD CREATININE-BSD FMLA CKD-EPI: >60 ML/MIN/{1.73_M2}
GLUCOSE SERPL-MCNC: 109 MG/DL (ref 70–99)
HCT VFR BLD AUTO: 45.3 % (ref 40.5–52.5)
HGB BLD-MCNC: 15.7 G/DL (ref 13.5–17.5)
LYMPHOCYTES # BLD: 2.1 K/UL (ref 1–5.1)
LYMPHOCYTES NFR BLD: 24.8 %
MCH RBC QN AUTO: 34.1 PG (ref 26–34)
MCHC RBC AUTO-ENTMCNC: 34.7 G/DL (ref 31–36)
MCV RBC AUTO: 98.2 FL (ref 80–100)
MONOCYTES # BLD: 0.9 K/UL (ref 0–1.3)
MONOCYTES NFR BLD: 11.5 %
NEUTROPHILS # BLD: 5.1 K/UL (ref 1.7–7.7)
NEUTROPHILS NFR BLD: 61.5 %
PLATELET # BLD AUTO: 196 K/UL (ref 135–450)
PMV BLD AUTO: 7.2 FL (ref 5–10.5)
POTASSIUM SERPL-SCNC: 4.4 MMOL/L (ref 3.5–5.1)
RBC # BLD AUTO: 4.61 M/UL (ref 4.2–5.9)
SODIUM SERPL-SCNC: 136 MMOL/L (ref 136–145)
WBC # BLD AUTO: 8.3 K/UL (ref 4–11)

## 2024-03-08 PROCEDURE — 36415 COLL VENOUS BLD VENIPUNCTURE: CPT

## 2024-03-08 PROCEDURE — 96372 THER/PROPH/DIAG INJ SC/IM: CPT

## 2024-03-08 PROCEDURE — 96376 TX/PRO/DX INJ SAME DRUG ADON: CPT

## 2024-03-08 PROCEDURE — G0378 HOSPITAL OBSERVATION PER HR: HCPCS

## 2024-03-08 PROCEDURE — 6370000000 HC RX 637 (ALT 250 FOR IP): Performed by: INTERNAL MEDICINE

## 2024-03-08 PROCEDURE — 80048 BASIC METABOLIC PNL TOTAL CA: CPT

## 2024-03-08 PROCEDURE — 6370000000 HC RX 637 (ALT 250 FOR IP)

## 2024-03-08 PROCEDURE — 6360000002 HC RX W HCPCS: Performed by: INTERNAL MEDICINE

## 2024-03-08 PROCEDURE — 99232 SBSQ HOSP IP/OBS MODERATE 35: CPT | Performed by: INTERNAL MEDICINE

## 2024-03-08 PROCEDURE — 85025 COMPLETE CBC W/AUTO DIFF WBC: CPT

## 2024-03-08 PROCEDURE — C9113 INJ PANTOPRAZOLE SODIUM, VIA: HCPCS

## 2024-03-08 PROCEDURE — 6360000002 HC RX W HCPCS

## 2024-03-08 PROCEDURE — 2060000000 HC ICU INTERMEDIATE R&B

## 2024-03-08 PROCEDURE — 2580000003 HC RX 258

## 2024-03-08 PROCEDURE — 99238 HOSP IP/OBS DSCHRG MGMT 30/<: CPT | Performed by: INTERNAL MEDICINE

## 2024-03-08 RX ORDER — ISOSORBIDE MONONITRATE 30 MG/1
30 TABLET, EXTENDED RELEASE ORAL DAILY
Qty: 30 TABLET | Refills: 3 | Status: SHIPPED | OUTPATIENT
Start: 2024-03-08

## 2024-03-08 RX ORDER — METOPROLOL SUCCINATE 50 MG/1
50 TABLET, EXTENDED RELEASE ORAL 2 TIMES DAILY
Status: DISCONTINUED | OUTPATIENT
Start: 2024-03-08 | End: 2024-03-08 | Stop reason: HOSPADM

## 2024-03-08 RX ORDER — ENOXAPARIN SODIUM 100 MG/ML
30 INJECTION SUBCUTANEOUS 2 TIMES DAILY
Status: DISCONTINUED | OUTPATIENT
Start: 2024-03-08 | End: 2024-03-08 | Stop reason: HOSPADM

## 2024-03-08 RX ORDER — ISOSORBIDE MONONITRATE 30 MG/1
30 TABLET, EXTENDED RELEASE ORAL DAILY
Status: DISCONTINUED | OUTPATIENT
Start: 2024-03-08 | End: 2024-03-08 | Stop reason: HOSPADM

## 2024-03-08 RX ADMIN — POLYETHYLENE GLYCOL (3350) 17 G: 17 POWDER, FOR SOLUTION ORAL at 08:19

## 2024-03-08 RX ADMIN — ENOXAPARIN SODIUM 30 MG: 100 INJECTION SUBCUTANEOUS at 08:19

## 2024-03-08 RX ADMIN — METOPROLOL TARTRATE 50 MG: 50 TABLET, FILM COATED ORAL at 08:19

## 2024-03-08 RX ADMIN — ISOSORBIDE MONONITRATE 30 MG: 30 TABLET, EXTENDED RELEASE ORAL at 10:40

## 2024-03-08 RX ADMIN — Medication 10 ML: at 08:18

## 2024-03-08 RX ADMIN — CLOPIDOGREL BISULFATE 75 MG: 75 TABLET ORAL at 08:19

## 2024-03-08 RX ADMIN — PANTOPRAZOLE SODIUM 40 MG: 40 INJECTION, POWDER, FOR SOLUTION INTRAVENOUS at 08:19

## 2024-03-08 RX ADMIN — SENNOSIDES 8.6 MG: 8.6 TABLET, FILM COATED ORAL at 08:19

## 2024-03-08 RX ADMIN — MUPIROCIN: 20 OINTMENT TOPICAL at 08:19

## 2024-03-08 ASSESSMENT — PAIN SCALES - WONG BAKER
WONGBAKER_NUMERICALRESPONSE: 0

## 2024-03-08 NOTE — PROGRESS NOTES
Shift assessment done,patient is awake,alert and oriented X 4.  He is blind.  He is on room air and oxygenating well,has clear breath sounds bilaterally.  He is on a regular diet and tolerates oral fluids well.  He is able to walk to the bathroom with minimal assistance especially in visualization to the bathroom.  He denies being in pain.  Bed is at its lowest level,call light within reach,will continue to monitor patient.

## 2024-03-08 NOTE — PROGRESS NOTES
Dr. Cain at the bedside to examine patient.   -informed patient has been in observation status.   -Informed patient is still constipated.  -informed of denied Chest pain.   -Needs to F/U Outpatient.   See progress note for details.

## 2024-03-08 NOTE — PROGRESS NOTES
HEART FAILURE CARE PLAN:    Comorbidities Reviewed: Yes   Patient has a past medical history of Blind in both eyes, CAD (coronary artery disease), Constipation, Histoplasmosis, Hyperlipidemia, Hypertension, and PVD (peripheral vascular disease) ([facility]).     Weights Reviewed: Yes   Admission weight: 99.8 kg (220 lb)   Wt Readings from Last 3 Encounters:   03/08/24 103.4 kg (228 lb)   02/21/24 99.8 kg (220 lb)   06/19/23 108.9 kg (240 lb)     Intake & Output Reviewed: Yes     Intake/Output Summary (Last 24 hours) at 3/8/2024 0834  Last data filed at 3/8/2024 0828  Gross per 24 hour   Intake 350 ml   Output --   Net 350 ml       ECHOCARDIOGRAM Reviewed: Yes   Patient's Ejection Fraction (EF) is greater than 40%     Medications Reviewed: Yes   SCHEDULED HOSPITAL MEDICATIONS:   enoxaparin  30 mg SubCUTAneous BID    mupirocin   Each Nostril BID    polyethylene glycol  17 g Oral BID    senna  1 tablet Oral BID    atorvastatin  40 mg Oral Nightly    clopidogrel  75 mg Oral Daily    metoprolol tartrate  50 mg Oral BID    sodium chloride flush  5-40 mL IntraVENous 2 times per day    aspirin  81 mg Oral Nightly    pantoprazole  40 mg IntraVENous BID     HOME MEDICATIONS:  Prior to Admission medications    Medication Sig Start Date End Date Taking? Authorizing Provider   pantoprazole sodium (PROTONIX) 40 MG PACK packet Take 1 packet by mouth every morning (before breakfast)    Bernarda Traore MD   atorvastatin (LIPITOR) 40 MG tablet Take 1 tablet by mouth nightly 9/28/21   Alistair Isaac MD   clopidogrel (PLAVIX) 75 MG tablet Take 1 tablet by mouth daily 9/28/21 3/6/24  Alistair Isaac MD   metoprolol tartrate (LOPRESSOR) 50 MG tablet Take 1 tablet by mouth 2 times daily Hold for SBP <100 or HR <65 9/28/21 3/6/24  Alistair Isaac MD   polyethylene glycol (MIRALAX) 17 GM/SCOOP powder Take by mouth 6/3/21   ProviderBernarda MD   aspirin 81 MG EC tablet Take 1 tablet by mouth daily  Patient taking differently:  Take 1 tablet by mouth nightly 5/23/21   Ginette Kramer MD   acetaminophen (APAP EXTRA STRENGTH) 500 MG tablet Take 2 tablets by mouth every 6 hours as needed for Pain  Patient taking differently: Take 2 tablets by mouth in the morning and 2 tablets in the evening. 5/22/21   Ginette Kramer MD      Diet Reviewed: Yes   ADULT DIET; Regular    Goal of Care Reviewed: Yes   Patient and/or Family's stated Goal of Care this Admission: Reduce shortness of breath, increase activity tolerance, better understand heart failure and disease management, be more comfortable, and reduce lower extremity edema prior to discharge.     Electronically signed by Domi Watkins RN on 3/8/2024 at 8:34 AM

## 2024-03-08 NOTE — PROGRESS NOTES
Reassessment done,patient is resting well,on room air,able to walk to the bathroom with minimal assistance.  No changes in reassessment.  Bed is at its lowest level,call light within reach,will continue to monitor patient.

## 2024-03-08 NOTE — PROGRESS NOTES
PROGRESS NOTE  S:68 yrs Patient  admitted on 3/6/2024 with Shortness of breath [R06.02]  Chest pain [R07.9]  Hx of CABG [Z95.1]  Chest pain, unspecified type [R07.9] .  Today, he denies abdominal pain. He ate breakfast this morning with no issues. He has not had BM yet. He reports passing gas.     Exam:   Vitals:    03/08/24 0900   BP:    Pulse: 91   Resp:    Temp:    SpO2: 91%   Generalized: alert, no acute distress  HEENT: sclera clear, anicteric  Neck: supple, trachea midline  Heart NSR  Abdomen: soft, NT, ND  Extremities: no edema      Medications: Reviewed    Labs:  CBC:   Recent Labs     03/06/24  0712 03/07/24  0456 03/08/24  0442   WBC 9.9 9.2 8.3   HGB 14.5 15.2 15.7   HCT 41.7 43.8 45.3   MCV 97.5 97.2 98.2    200 196     BMP:   Recent Labs     03/06/24  0712 03/07/24  0456 03/08/24  0442    133* 136   K 3.9 4.1 4.4    98* 101   CO2 25 23 24   BUN 11 9 14   CREATININE 0.9 0.7* 0.8     Assessment   67 yo male with pmx of CAD s/p CABG, PVD s/p fem bypass, CHF, blindness, HTN, and HLD admitted with c/o LUQ abdominal pain likely secondary to gastritis, esophagitis, or PUD. CT imaging with esophageal wall thickening suggestive of esophagitis. Cardiac stress tests for chest pain without significant ischemia. Abdominal pain improved.     Plan   Continue supportive care  PPI BID  Continue miralax and senna BID   Regular diet as tolerated   Cardiology consulted--provided cardiac clearance    Okay with discharge from GI standpoint   Will need schedule outpatient EGD + colonoscopy with Dr. Leela Blanc, APRN - CNP  9:17 AM 3/8/2024                         [Anxiety] : anxiety [Negative] : Heme/Lymph [Night Sweats] : no night sweats [Recent Change In Weight] : ~T no recent weight change [Abdominal Pain] : no abdominal pain [FreeTextEntry1] : NSAID allergy, rash and hives

## 2024-03-08 NOTE — DISCHARGE SUMMARY
Name:  Daniel Stevenson  Room:  3008/3008-01  MRN:    [id number]    IM Discharge Summary    Discharging Physician:  Louis carr MD    Admit: 3/6/2024    Discharge:      PCP      Дмитрий Higgins APRN - CNP    Diagnoses and hospital course  this Admission           Chest pain  CAD s/p 3V CABG 8/17/21  - on statin, asa, Plavix, and lopressor  - EKG without any ischemic changes   - Chest x-ray: stable mild cardiomegaly, with mild pulmonary vascular congestion, though no evidence of interstitial edema or overt CHF   - CTA chest neg for PE  - Trend troponins: Negative x2,   - stress test abn  with Medium sized, severe intensity, apical and apical-inferior perfusion defect  at stress with minimal improvement at rest consistent with predominant scar  and minimal ailyn-infarct ischemia.   - cardiology consulted and medical mx advised- added imdur and plans to follow up outpt    Acute Hypoxia   - likely 2/2 pain medication  - O2 sats 87% on RA, pt not on O2 at baseline   - supp O2, wean as tolerated, currently improved and off o2      LUQ abd pain  - CT abd/pelvis with cholelithiasis   - prn pain control and antiemetics   - IV PPI  - GI consulted and plans for EGD as outpt noted     HTN  - on lopressor- stable     Chronic combined CHF  Ischemic cardiomyopathy   - last echo on 6/11/21 with EF of 40-45% and grade I diastolic dysfunction  - appears compensated   - monitor daily weights and I/Os     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  - Complicating assessment and treatment. Placing patient at risk for multiple co-morbidities as well as early death and contributing to the patient's presentation.   - Counseled on weight loss.            HPI 68 y.o. male with PMH of legally blind, histoplasmosis, CAD s/p CABG, HTN, and CHF who

## 2024-03-08 NOTE — PLAN OF CARE
Problem: Discharge Planning  Goal: Discharge to home or other facility with appropriate resources  Outcome: Progressing     Problem: Pain  Goal: Verbalizes/displays adequate comfort level or baseline comfort level  3/7/2024 2141 by Korina Rice RN  Outcome: Progressing  3/7/2024 1449 by Za Soto RN  Outcome: Progressing     Problem: Safety - Adult  Goal: Free from fall injury  3/7/2024 2141 by Korina Rice RN  Outcome: Progressing  3/7/2024 1449 by Za Soto RN  Outcome: Progressing     Problem: ABCDS Injury Assessment  Goal: Absence of physical injury  Outcome: Progressing     Problem: Chronic Conditions and Co-morbidities  Goal: Patient's chronic conditions and co-morbidity symptoms are monitored and maintained or improved  3/7/2024 2141 by Korina Rice RN  Outcome: Progressing  3/7/2024 1449 by Za Soto RN  Outcome: Progressing

## 2024-03-08 NOTE — CARE COORDINATION
DISCHARGE ORDER  Date/Time 3/8/2024 11:49 AM  Completed by: Teresa Boeck, RN, Case Management    Patient Name: Daniel Stevenson Jr.      : 1955  Admitting Diagnosis: Shortness of breath [R06.02]  Chest pain [R07.9]  Hx of CABG [Z95.1]  Chest pain, unspecified type [R07.9]  Angina at rest [I20.89]      Admit order Date and Status: 3/6/24  (verify MD's last order for status of admission)      Noted discharge order.   If applicable PT/OT recommendation at Discharge: NA  DME recommendation by PT/OT:  Confirmed discharge plan: Yes  with whom  patient and spouse  If pt confirmed DC plan does family need to be contacted by CM No if yes who______    Discharge Plan: The patient is discharging to home with spouse. Spouse is at bedside.     Date of Last IMM Given: 3/8/24    Reviewed chart.  Role of discharge planner explained and patient verbalized understanding. Discharge order is noted.    Has Home O2 in place on admit:  No  Informed of need to bring portable home O2 tank on day of discharge for nursing to connect prior to leaving:   Not Indicated  Verbalized agreement/Understanding:   Not Indicated  Pt is being d/c'd to home today. Pt's O2 sats are 93% on RA.    Discharge timeout done with Domi JONES. All discharge needs and concerns addressed.

## 2024-03-08 NOTE — CARE COORDINATION
CM delivered 2nd IMM delivered within 4 hours for DC, verbal explanation of patient rights at bedside. Pt voiced understanding of discharge MCR rights and is agreeable to discharge.

## 2024-03-08 NOTE — PROGRESS NOTES
D/C instructions reviewed with patient at bedside, Pt A/O x4. Pt denies questions or concerns. Care plan and patient education complete. IV removed. Pt has copy of D/C instructions and prescriptions. All documented personal belongings collected and leaving with patient. Pt leaving floor via wheelchair with transport.      CHF Folder and education provided to patient and Spouse

## 2024-03-08 NOTE — PROGRESS NOTES
Shift assessment was completed (see flow sheet). Pt is A/O, denies any pain or other needs at this time.  Assisted with setting up breakfast.   Pt Blind at baseline- only seeing shadows.    Respirations are even, unlabored, with clear sounds. On RA.    Scheduled medications to follow- whole with water.     Infusing:  N/A    Call light within reach. Bed in lowest position. Bed alarm on.     Patient is able to demonstrate the ability to move from a reclining position to an upright position within the recliner.

## 2024-03-08 NOTE — PLAN OF CARE
Problem: Discharge Planning  Goal: Discharge to home or other facility with appropriate resources  3/8/2024 1131 by Domi Watkins RN  Outcome: Adequate for Discharge  3/7/2024 2141 by Korina Rice RN  Outcome: Progressing     Problem: Pain  Goal: Verbalizes/displays adequate comfort level or baseline comfort level  3/8/2024 1131 by Domi Watkins RN  Outcome: Adequate for Discharge  3/7/2024 2141 by Korina Rice RN  Outcome: Progressing     Problem: Safety - Adult  Goal: Free from fall injury  3/8/2024 1131 by Domi Watkins RN  Outcome: Adequate for Discharge  3/7/2024 2141 by Korina Rice RN  Outcome: Progressing     Problem: ABCDS Injury Assessment  Goal: Absence of physical injury  3/8/2024 1131 by Domi Watkins RN  Outcome: Adequate for Discharge  3/7/2024 2141 by Korina Rice RN  Outcome: Progressing     Problem: Chronic Conditions and Co-morbidities  Goal: Patient's chronic conditions and co-morbidity symptoms are monitored and maintained or improved  3/8/2024 1131 by Domi Watkins RN  Outcome: Adequate for Discharge  3/7/2024 2141 by Korina Rice RN  Outcome: Progressing

## 2024-03-08 NOTE — PROGRESS NOTES
[facility] Daily Progress Note      Admit Date:  3/6/2024    Subjective:  Mr. Stevenson is seen for left upper quadrant abdominal pain.No longer has pain.  Stress test no significant zones of ischemia mainly scar. Discussed with Dr Justice who read the test.  Patient denies any chest pain.  No nausea no chest pain, shortness of breath, palpitations, sweating. No radiation of pain. Pain is very vague and may last hrs goes away by itself. No BM x3 days. No prior colonoscopy.  His description of pain keeps changing.       Reason for Consultation/Chief Complaint: \"I have been having pain left side of chest and left upper part of abdomen..\"     History of Present Illness:  Daniel Stevenson Jr. is a 68 y.o. patient who presented to the hospital with complaints of pain as mentioned above. He describes this as intermittent but not related to exertion. Duration of pain is about  15 min. It started about a week ago.  It is not related to food and goes away by itself. He has shortness of breath with it and says his BP goes up when he has pain. No nausea sweating palpitations or dizziness.  About 2 yrs ago he had CABG at [facility]. He is not following with cardiology but has been taking all his meds including DAPT metoprolol and statin.   I have been asked to provide consultation regarding further management and testing.     ROS:  12 point ROS negative in all areas as listed below except as in [place]  Constitutional, EENT, pulmonary, GI, , Musculoskeletal, skin, neurological, hematological, endocrine, Psychiatric    Past Medical History:   Diagnosis Date    Blind in both eyes     legally blind - has minimal peripheral vision right eye    CAD (coronary artery disease)     Constipation     Histoplasmosis     Hyperlipidemia     Hypertension     PVD (peripheral vascular disease) ([facility])      Past Surgical History:   Procedure Laterality Date    CORONARY ARTERY BYPASS GRAFT N/A 8/17/2021    CORONARY ARTERY BYPASS GRAFTING X3 ,

## 2024-03-11 ENCOUNTER — CARE COORDINATION (OUTPATIENT)
Dept: CASE MANAGEMENT | Age: 69
End: 2024-03-11

## 2024-03-11 NOTE — CARE COORDINATION
Care Transitions Initial Follow Up Call    Call within 2 business days of discharge: Yes    Patient: Daniel Stevenson Jr. Patient : 1955   MRN: [id number]  Reason for Admission: 2 days -> CAD s/p 3V CABG 2021, acute hypoxia likely 2/2 pain meds, LUQ abd pain, HTN, chronic combined CHF, ischemic cardiomyopathy, ischemic cardiomyopathy, hx right iliac occlusion s/p L-R fem-fem bypass 2021, GERD, morbid obesity -> home no services, non [facility] PCP, f/up Leela for outpt EGD & colonoscopy  Discharge Date: 3/8/24 RARS: Readmission Risk Score: 7.7      Last Discharge Facility       Date Complaint Diagnosis Description Type Department Provider    3/6/24 Chest Pain; Abdominal Pain Chest pain, unspecified type ... ED to Hosp-Admission (Discharged) (ADMITTED) [facility] ICU Louis Cain MD; Terra...     Unable to reach or leave message at either listed number.    Follow Up  Future Appointments   Date Time Provider Department Center   2024  2:15 PM Pablo Kelly, APRN - CNP [facility] CARSON Lloyd, RN  Care Transition Nurse  [phone] mobile

## 2024-03-12 ENCOUNTER — CARE COORDINATION (OUTPATIENT)
Dept: CASE MANAGEMENT | Age: 69
End: 2024-03-12

## 2024-03-12 NOTE — CARE COORDINATION
Care Transitions Initial Follow Up Call    Call within 2 business days of discharge: Yes      Patient: Daniel Stevenson Jr. Patient : 1955   MRN: [id number]  Reason for Admission: 2 days -> CAD s/p 3V CABG 2021, acute hypoxia likely 2/2 pain meds, LUQ abd pain, HTN, chronic combined CHF, ischemic cardiomyopathy, ischemic cardiomyopathy, hx right iliac occlusion s/p L-R fem-fem bypass 2021, GERD, morbid obesity -> home no services, non [facility] PCP, f/up [facility] for outpt EGD & colonoscopy   Discharge Date: 3/8/24 RARS: Readmission Risk Score: 7.7      Last Discharge Facility       Date Complaint Diagnosis Description Type Department Provider    3/6/24 Chest Pain; Abdominal Pain Chest pain, unspecified type ... ED to Hosp-Admission (Discharged) (ADMITTED) [facility] Louis Cain MD; Terra...     2nd/final attempt - Unable to reach or leave message. Care transition program closed.     Follow Up  Future Appointments   Date Time Provider Department Center   2024  2:15 PM Pablo Kelly, APRN - CNP P [facility] CARSON Lloyd, RN  Care Transition Nurse  [phone] mobile

## 2024-03-19 ENCOUNTER — TELEPHONE (OUTPATIENT)
Dept: CARDIOLOGY CLINIC | Age: 69
End: 2024-03-19

## 2024-03-19 NOTE — TELEPHONE ENCOUNTER
Дмитрий Higgins called back and sts he found in RKG consult notes clearance for EGD/colonoscopy. TY

## 2024-03-19 NOTE — TELEPHONE ENCOUNTER
Pts pcp NP Дмитрий Higgins stated that he saw pt on 03/19 and they were discussing the Imdur. Pt mentioned that he thinks its working but he does get headaches with it. RASHAAD Higgins would like a call back with Imdur directions/recommendations. [phone]

## 2024-03-19 NOTE — TELEPHONE ENCOUNTER
Headaches are a common side effect of imdur. If overall tolerable, would continue imdur since improving chest pain symptoms. If headache is significant, can change to ranexa 500 mg BID.

## 2024-03-19 NOTE — TELEPHONE ENCOUNTER
Pts pcp NP Дмитрий Higgins called the office to inform us that pt is having a colonoscopy and endoscope done on 03/29/2024 with Twan GORMAN. They would like to hold the Plavix 5 days prior. Дмитрий Higgins did not have other details for cardiac clearance. Please advise.

## 2024-04-04 ENCOUNTER — HOSPITAL ENCOUNTER (EMERGENCY)
Age: 69
Discharge: HOME | End: 2024-04-04
Payer: MEDICARE

## 2024-04-04 VITALS
DIASTOLIC BLOOD PRESSURE: 74 MMHG | HEART RATE: 75 BPM | OXYGEN SATURATION: 95 % | SYSTOLIC BLOOD PRESSURE: 145 MMHG | HEIGHT: 67 IN | WEIGHT: 212 LBS | BODY MASS INDEX: 33.27 KG/M2 | TEMPERATURE: 97.5 F | RESPIRATION RATE: 17 BRPM

## 2024-04-04 DIAGNOSIS — K62.5 RECTAL BLEEDING: Primary | ICD-10-CM

## 2024-04-04 DIAGNOSIS — Z86.010 HX OF COLONOSCOPY WITH POLYPECTOMY: ICD-10-CM

## 2024-04-04 DIAGNOSIS — Z98.890 HX OF COLONOSCOPY WITH POLYPECTOMY: ICD-10-CM

## 2024-04-04 LAB
ALBUMIN SERPL-MCNC: 3.8 G/DL (ref 3.4–5)
ALBUMIN/GLOB SERPL: 1.2 {RATIO} (ref 1.1–2.2)
ALP SERPL-CCNC: 65 U/L (ref 40–129)
ALT SERPL-CCNC: 28 U/L (ref 10–40)
ANION GAP SERPL CALCULATED.3IONS-SCNC: 9 MMOL/L (ref 3–16)
AST SERPL-CCNC: 20 U/L (ref 15–37)
BASOPHILS # BLD: 0.1 K/UL (ref 0–0.2)
BASOPHILS NFR BLD: 0.9 %
BILIRUB SERPL-MCNC: 0.3 MG/DL (ref 0–1)
BUN SERPL-MCNC: 14 MG/DL (ref 7–20)
CALCIUM SERPL-MCNC: 9 MG/DL (ref 8.3–10.6)
CHLORIDE SERPL-SCNC: 110 MMOL/L (ref 99–110)
CO2 SERPL-SCNC: 26 MMOL/L (ref 21–32)
CREAT SERPL-MCNC: 1.2 MG/DL (ref 0.8–1.3)
DEPRECATED RDW RBC AUTO: 13.9 % (ref 12.4–15.4)
EOSINOPHIL # BLD: 0.1 K/UL (ref 0–0.6)
EOSINOPHIL NFR BLD: 1.8 %
GFR SERPLBLD CREATININE-BSD FMLA CKD-EPI: 66 ML/MIN/{1.73_M2}
GLUCOSE SERPL-MCNC: 110 MG/DL (ref 70–99)
HCT VFR BLD AUTO: 38.5 % (ref 40.5–52.5)
HEMOCCULT STL QL: ABNORMAL
HGB BLD-MCNC: 13.4 G/DL (ref 13.5–17.5)
LYMPHOCYTES # BLD: 1.9 K/UL (ref 1–5.1)
LYMPHOCYTES NFR BLD: 27.8 %
MCH RBC QN AUTO: 33.9 PG (ref 26–34)
MCHC RBC AUTO-ENTMCNC: 34.7 G/DL (ref 31–36)
MCV RBC AUTO: 97.7 FL (ref 80–100)
MONOCYTES # BLD: 0.7 K/UL (ref 0–1.3)
MONOCYTES NFR BLD: 9.5 %
NEUTROPHILS # BLD: 4.1 K/UL (ref 1.7–7.7)
NEUTROPHILS NFR BLD: 60 %
PLATELET # BLD AUTO: 194 K/UL (ref 135–450)
PMV BLD AUTO: 6.9 FL (ref 5–10.5)
POTASSIUM SERPL-SCNC: 4.1 MMOL/L (ref 3.5–5.1)
PROT SERPL-MCNC: 6.9 G/DL (ref 6.4–8.2)
RBC # BLD AUTO: 3.94 M/UL (ref 4.2–5.9)
SODIUM SERPL-SCNC: 145 MMOL/L (ref 136–145)
WBC # BLD AUTO: 6.9 K/UL (ref 4–11)

## 2024-04-04 PROCEDURE — 82270 OCCULT BLOOD FECES: CPT

## 2024-04-04 PROCEDURE — 85025 COMPLETE CBC W/AUTO DIFF WBC: CPT

## 2024-04-04 PROCEDURE — 99283 EMERGENCY DEPT VISIT LOW MDM: CPT

## 2024-04-04 PROCEDURE — 80053 COMPREHEN METABOLIC PANEL: CPT

## 2024-04-04 PROCEDURE — 36415 COLL VENOUS BLD VENIPUNCTURE: CPT

## 2024-04-04 ASSESSMENT — PAIN - FUNCTIONAL ASSESSMENT: PAIN_FUNCTIONAL_ASSESSMENT: NONE - DENIES PAIN

## 2024-04-04 ASSESSMENT — ENCOUNTER SYMPTOMS
SINUS PRESSURE: 0
BLOOD IN STOOL: 1
COUGH: 0
SHORTNESS OF BREATH: 0
VOMITING: 0
CHEST TIGHTNESS: 0
NAUSEA: 0
RHINORRHEA: 0
ABDOMINAL PAIN: 0
TROUBLE SWALLOWING: 0
SINUS PAIN: 0
WHEEZING: 0
SORE THROAT: 0

## 2024-04-04 ASSESSMENT — LIFESTYLE VARIABLES
HOW OFTEN DO YOU HAVE A DRINK CONTAINING ALCOHOL: NEVER
HOW MANY STANDARD DRINKS CONTAINING ALCOHOL DO YOU HAVE ON A TYPICAL DAY: PATIENT DOES NOT DRINK

## 2024-04-05 NOTE — ED PROVIDER NOTES
sounds. No stridor, decreased air movement or transmitted upper airway sounds. No decreased breath sounds, wheezing, rhonchi or rales.   Abdominal:      General: Abdomen is flat.      Palpations: Abdomen is soft. There is no hepatomegaly or splenomegaly.      Tenderness: There is no abdominal tenderness. There is no guarding. Negative signs include Gupta's sign, Rovsing's sign and McBurney's sign.      Hernia: No hernia is present.   Genitourinary:     Rectum: Guaiac result positive. External hemorrhoid and internal hemorrhoid present. No mass, tenderness or anal fissure. Normal anal tone.          Comments: Singular small internal hemorrhoid appreciated on digital rectal exam.   Skin:     General: Skin is warm and dry.      Capillary Refill: Capillary refill takes less than 2 seconds.   Neurological:      General: No focal deficit present.      Mental Status: He is alert, oriented to person, place, and time and easily aroused.      GCS: GCS eye subscore is 4. GCS verbal subscore is 5. GCS motor subscore is 6.      Sensory: Sensation is intact. No sensory deficit.      Motor: Motor function is intact. No weakness or abnormal muscle tone.      Coordination: Coordination is intact.      Gait: Gait is intact. Gait normal.   Psychiatric:         Behavior: Behavior is cooperative.           DIAGNOSTIC RESULTS   LABS:    Labs Reviewed   CBC WITH AUTO DIFFERENTIAL - Abnormal; Notable for the following components:       Result Value    RBC 3.94 (*)     Hemoglobin 13.4 (*)     Hematocrit 38.5 (*)     All other components within normal limits   BLOOD OCCULT STOOL DIAGNOSTIC - Abnormal; Notable for the following components:    Occult Blood Diagnostic   (*)     Value: Result: POSITIVE  Normal range: Negative      All other components within normal limits    Narrative:     ORDER#: [id number]                          ORDERED BY: MARCELO CARDOZO  SOURCE: Stool                              COLLECTED:  04/04/24 21:19  ANTIBIOTICS

## 2024-04-05 NOTE — DISCHARGE INSTRUCTIONS
Your blood counts were stable.  No evidence of blood loss anemia.  Your fecal Hemoccult was grossly positive which is likely related to the polypectomy and biopsies obtained during your colonoscopy 5 days ago.  Hold your next dose of Plavix.  You will hold 1 singular dose and then restart your medication.  Okay to continue aspirin.  Contact [facility] tomorrow to schedule follow-up with your GI doc.    Please return with any acutely worsening or concerning symptoms.

## 2024-04-11 ENCOUNTER — OFFICE VISIT (OUTPATIENT)
Dept: CARDIOLOGY CLINIC | Age: 69
End: 2024-04-11
Payer: MEDICARE

## 2024-04-11 VITALS
HEIGHT: 67 IN | DIASTOLIC BLOOD PRESSURE: 62 MMHG | WEIGHT: 219 LBS | HEART RATE: 85 BPM | OXYGEN SATURATION: 96 % | BODY MASS INDEX: 34.37 KG/M2 | SYSTOLIC BLOOD PRESSURE: 110 MMHG

## 2024-04-11 DIAGNOSIS — I73.9 PVD (PERIPHERAL VASCULAR DISEASE) (HCC): ICD-10-CM

## 2024-04-11 DIAGNOSIS — I25.10 CORONARY ARTERY DISEASE INVOLVING NATIVE CORONARY ARTERY OF NATIVE HEART WITHOUT ANGINA PECTORIS: Primary | ICD-10-CM

## 2024-04-11 DIAGNOSIS — I25.5 ISCHEMIC CARDIOMYOPATHY: ICD-10-CM

## 2024-04-11 PROCEDURE — G8417 CALC BMI ABV UP PARAM F/U: HCPCS | Performed by: NURSE PRACTITIONER

## 2024-04-11 PROCEDURE — 3078F DIAST BP <80 MM HG: CPT | Performed by: NURSE PRACTITIONER

## 2024-04-11 PROCEDURE — G8427 DOCREV CUR MEDS BY ELIG CLIN: HCPCS | Performed by: NURSE PRACTITIONER

## 2024-04-11 PROCEDURE — 1036F TOBACCO NON-USER: CPT | Performed by: NURSE PRACTITIONER

## 2024-04-11 PROCEDURE — 3074F SYST BP LT 130 MM HG: CPT | Performed by: NURSE PRACTITIONER

## 2024-04-11 PROCEDURE — 99214 OFFICE O/P EST MOD 30 MIN: CPT | Performed by: NURSE PRACTITIONER

## 2024-04-11 PROCEDURE — 1123F ACP DISCUSS/DSCN MKR DOCD: CPT | Performed by: NURSE PRACTITIONER

## 2024-04-11 PROCEDURE — 3017F COLORECTAL CA SCREEN DOC REV: CPT | Performed by: NURSE PRACTITIONER

## 2024-04-11 RX ORDER — BACILLUS COAGULANS/INULIN 1B-250 MG
CAPSULE ORAL
COMMUNITY

## 2024-04-11 RX ORDER — SUCRALFATE 1 G/1
1 TABLET ORAL 4 TIMES DAILY
COMMUNITY

## 2024-04-11 NOTE — PROGRESS NOTES
[facility]   Cardiology Note              Date:  April 11, 2024  Patientname: Daniel Stevenson Jr.  YOB: 1955    Primary Care physician: Дмитрий Higgins APRN - CNP    HISTORY OF PRESENT ILLNESS: Daniel Stevenson Jr. is a 68 y.o. male with a history of CAD, ischemic cardiomyopathy, PVD, HTN, HLD, blindness. He had L-R fem bypass 6/2021. Echo 6/2021 showed EF 40-45%. Stress test abnormal and LHC showed multi vessel CAD. He had CABG x3 8/2021.  He was admitted 3/2024 for abdominal pain. Stress test showed mostly fixed defects c/w scar, minimal ailyn infarct ischemia, EF 50%. Medical management recommended and started on imdur. Also treated for gastritis. Plan for outpatient EGD/colonoscopy.     Today he presents for hospital follow up for CAD. He is feeling better. LUQ pain is improving. He denies any significant chest pain, shortness of breath, palpitations, dizziness, edema, syncope. left side stomach pain, now getting better. He does have a headache with imdur. Prior to hospitalization he was walking 3-5 miles on the treadmill and tolerating well.     Cardiologist: Dr. Isaac 2021    Past Medical History:   has a past medical history of Blind in both eyes, CAD (coronary artery disease), Constipation, Histoplasmosis, Hyperlipidemia, Hypertension, and PVD (peripheral vascular disease) (HCC).    Past Surgical History:   has a past surgical history that includes eye surgery (Bilateral); Femoral-femoral Bypass Graft (Bilateral, 6/28/2021); vascular surgery; and Coronary artery bypass graft (N/A, 8/17/2021).     Home Medications:    Prior to Admission medications    Medication Sig Start Date End Date Taking? Authorizing Provider   isosorbide mononitrate (IMDUR) 30 MG extended release tablet Take 1 tablet by mouth daily 3/8/24   Louis Cain MD   pantoprazole sodium (PROTONIX) 40 MG PACK packet Take 1 packet by mouth every morning (before breakfast)    Provider, MD Bernarda   atorvastatin

## 2024-04-11 NOTE — PATIENT INSTRUCTIONS
Ok to stop imdur due to headaches; if any worsening of symptoms, call the office  Check echocardiogram for heart strength, call [phone]  If heart function still weak, may need to adjust medications  Continue other medications  Stay active along with a healthy diet  Follow up with Dr. Isaac

## 2024-04-12 ASSESSMENT — ENCOUNTER SYMPTOMS: RESPIRATORY NEGATIVE: 1

## 2024-07-25 ENCOUNTER — TELEPHONE (OUTPATIENT)
Dept: CARDIOLOGY CLINIC | Age: 69
End: 2024-07-25

## 2024-07-25 ENCOUNTER — HOSPITAL ENCOUNTER (OUTPATIENT)
Age: 69
Discharge: HOME | End: 2024-07-27
Payer: MEDICARE

## 2024-07-25 VITALS
BODY MASS INDEX: 34.37 KG/M2 | HEIGHT: 67 IN | SYSTOLIC BLOOD PRESSURE: 110 MMHG | DIASTOLIC BLOOD PRESSURE: 62 MMHG | WEIGHT: 219 LBS

## 2024-07-25 DIAGNOSIS — Z79.899 MEDICATION MANAGEMENT: Primary | ICD-10-CM

## 2024-07-25 DIAGNOSIS — I25.5 ISCHEMIC CARDIOMYOPATHY: ICD-10-CM

## 2024-07-25 LAB
ECHO AO ASC DIAM: 3.3 CM
ECHO AO ASCENDING AORTA INDEX: 1.57 CM/M2
ECHO AO ROOT DIAM: 3.4 CM
ECHO AO ROOT INDEX: 1.62 CM/M2
ECHO AV MEAN GRADIENT: 5 MMHG
ECHO AV MEAN VELOCITY: 1 M/S
ECHO AV PEAK GRADIENT: 9 MMHG
ECHO AV PEAK VELOCITY: 1.5 M/S
ECHO AV VELOCITY RATIO: 0.73
ECHO AV VTI: 38.2 CM
ECHO BSA: 2.17 M2
ECHO EST RA PRESSURE: 3 MMHG
ECHO IVC EXP: 2 CM
ECHO LA AREA 2C: 17.5 CM2
ECHO LA AREA 4C: 15.7 CM2
ECHO LA MAJOR AXIS: 5.5 CM
ECHO LA MINOR AXIS: 5.1 CM
ECHO LA VOL BP: 43 ML (ref 18–58)
ECHO LA VOL MOD A2C: 49 ML (ref 18–58)
ECHO LA VOL MOD A4C: 37 ML (ref 18–58)
ECHO LA VOL/BSA BIPLANE: 20 ML/M2 (ref 16–34)
ECHO LA VOLUME INDEX MOD A2C: 23 ML/M2 (ref 16–34)
ECHO LA VOLUME INDEX MOD A4C: 18 ML/M2 (ref 16–34)
ECHO LV E' LATERAL VELOCITY: 10 CM/S
ECHO LV E' SEPTAL VELOCITY: 7 CM/S
ECHO LV FRACTIONAL SHORTENING: 14 % (ref 28–44)
ECHO LV INTERNAL DIMENSION DIASTOLE INDEX: 2.67 CM/M2
ECHO LV INTERNAL DIMENSION DIASTOLIC: 5.6 CM (ref 4.2–5.9)
ECHO LV INTERNAL DIMENSION SYSTOLIC INDEX: 2.29 CM/M2
ECHO LV INTERNAL DIMENSION SYSTOLIC: 4.8 CM
ECHO LV ISOVOLUMETRIC RELAXATION TIME (IVRT): 85 MS
ECHO LV IVSD: 0.7 CM (ref 0.6–1)
ECHO LV MASS 2D: 139.9 G (ref 88–224)
ECHO LV MASS INDEX 2D: 66.6 G/M2 (ref 49–115)
ECHO LV POSTERIOR WALL DIASTOLIC: 0.7 CM (ref 0.6–1)
ECHO LV RELATIVE WALL THICKNESS RATIO: 0.25
ECHO LVOT AV VTI INDEX: 0.64
ECHO LVOT MEAN GRADIENT: 2 MMHG
ECHO LVOT PEAK GRADIENT: 5 MMHG
ECHO LVOT PEAK VELOCITY: 1.1 M/S
ECHO LVOT VTI: 24.6 CM
ECHO MV A VELOCITY: 1.02 M/S
ECHO MV E DECELERATION TIME (DT): 158 MS
ECHO MV E VELOCITY: 0.87 M/S
ECHO MV E/A RATIO: 0.85
ECHO MV E/E' LATERAL: 8.7
ECHO MV E/E' RATIO (AVERAGED): 10.56
ECHO MV E/E' SEPTAL: 12.43
ECHO MV LVOT VTI INDEX: 1.15
ECHO MV MAX VELOCITY: 1.2 M/S
ECHO MV MEAN GRADIENT: 2 MMHG
ECHO MV MEAN VELOCITY: 0.8 M/S
ECHO MV PEAK GRADIENT: 6 MMHG
ECHO MV VTI: 28.3 CM
ECHO PV MAX VELOCITY: 0.9 M/S
ECHO PV MEAN GRADIENT: 2 MMHG
ECHO PV MEAN VELOCITY: 0.6 M/S
ECHO PV PEAK GRADIENT: 3 MMHG
ECHO PV VTI: 19.3 CM
ECHO RA AREA 4C: 13.2 CM2
ECHO RA END SYSTOLIC VOLUME APICAL 4 CHAMBER INDEX BSA: 13 ML/M2
ECHO RA VOLUME: 28 ML
ECHO RIGHT VENTRICULAR SYSTOLIC PRESSURE (RVSP): 17 MMHG
ECHO RV BASAL DIMENSION: 3.6 CM
ECHO RV FREE WALL PEAK S': 9 CM/S
ECHO RV LONGITUDINAL DIMENSION: 8.2 CM
ECHO RV MID DIMENSION: 2.7 CM
ECHO RV TAPSE: 1.8 CM (ref 1.7–?)
ECHO TV REGURGITANT MAX VELOCITY: 1.89 M/S
ECHO TV REGURGITANT PEAK GRADIENT: 14 MMHG

## 2024-07-25 PROCEDURE — 93306 TTE W/DOPPLER COMPLETE: CPT | Performed by: INTERNAL MEDICINE

## 2024-07-25 PROCEDURE — 93306 TTE W/DOPPLER COMPLETE: CPT

## 2024-07-25 RX ORDER — METOPROLOL SUCCINATE 50 MG/1
50 TABLET, EXTENDED RELEASE ORAL DAILY
Qty: 90 TABLET | Refills: 1 | Status: SHIPPED | OUTPATIENT
Start: 2024-07-25

## 2024-07-25 NOTE — TELEPHONE ENCOUNTER
----- Message from JANET Ardon CNP sent at 7/25/2024  2:52 PM EDT -----  Please let him know that heart function appears moderately reduced, similar to prior study in 2021. I would like to adjust medications to better help heart strength. Lets change metoprolol tartrate to metoprolol succinate 50 mg daily. I would also like to add entresto 24-26 mg BID for heart strength. BMP in 1 week as well as lipids to ensure LDL controlled. He should keep appointment with Dr. Isaac next week to discuss further. Thank you!

## 2024-07-30 NOTE — PROGRESS NOTES
[facility]   Cardiac Followup    Referring Provider:  Дмитрий Higgins, APRN - CNP     Chief Complaint   Patient presents with    Follow-up    Hypertension    Hyperlipidemia    Cardiomyopathy    Congestive Heart Failure      I saw originally 7/13/21 referred by NP Дмитрий Higgins for abnormal ECHO    Subjective: Mr. Stevenson is here for cardiology follow; c/o medications are too expensive but no physical complaints    History of Present Illness:  Daniel Stevenson Jr. is a 68 y.o. male who has PMH CAD s/p 3V CABG 8/17/21 per Dr. 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 Lee. Prior testing: CTA head and neck 5/21/21 No flow-limiting arterial stenosis in the head and neck.    Lexiscan 7/30/21 showed EF=40%; multiple abnls; LHC 8/12/21 showed MV CAD/ASHD with LAD . Underwent 3V CABG on 8/17/2021.     Admitted 3/6/24 with LUQ and CP. Isamar Nuc 3/7/24 medium sized, severe intensity, apical and apical-inferior perfusion defect at stress with minimal improvement at rest c/w predominant scar and minimal ailyn-infarct ischemia. Recommended med mgt only and started imdur. Presented to ED 4/4/24 for rectal bleeding. Endoscopy showed polyps, external and internal hemorrhoids. On 4/11/24 Pablo Kelly NP stopped Imdur due to HA. Echo 7/25/24 EF=35-40% (40-45% in 6/21); mod global HK; AV trileaflet with mild AV sclerosis.    Today, his wife is present.  He is now exercising on the treadmill regularly. He is able to walk miles without symptoms.  He is now able to go to [employer] and walk without any difficulty. Patient denies current edema, chest pain, shortness of breath, palpitations, dizziness or syncope.  Patient is taking all cardiac medications as prescribed and tolerates them well. Note he does NOT have drug plan and pays for meds out of pocket. He stopped smoking post-CABG.      Weight today is 224# (Up 22# from 9/21)     Past

## 2024-07-31 ENCOUNTER — OFFICE VISIT (OUTPATIENT)
Dept: CARDIOLOGY CLINIC | Age: 69
End: 2024-07-31
Payer: MEDICARE

## 2024-07-31 VITALS
OXYGEN SATURATION: 97 % | DIASTOLIC BLOOD PRESSURE: 76 MMHG | BODY MASS INDEX: 35.25 KG/M2 | HEART RATE: 77 BPM | HEIGHT: 67 IN | SYSTOLIC BLOOD PRESSURE: 134 MMHG | WEIGHT: 224.6 LBS

## 2024-07-31 DIAGNOSIS — Z79.899 MEDICATION MANAGEMENT: ICD-10-CM

## 2024-07-31 DIAGNOSIS — I15.9 SECONDARY HYPERTENSION: Primary | ICD-10-CM

## 2024-07-31 DIAGNOSIS — Z87.891 HISTORY OF TOBACCO ABUSE: ICD-10-CM

## 2024-07-31 DIAGNOSIS — Z95.828 S/P FEMORAL-FEMORAL BYPASS SURGERY: ICD-10-CM

## 2024-07-31 DIAGNOSIS — I42.9 CARDIOMYOPATHY, UNSPECIFIED TYPE (HCC): ICD-10-CM

## 2024-07-31 DIAGNOSIS — E78.49 OTHER HYPERLIPIDEMIA: ICD-10-CM

## 2024-07-31 DIAGNOSIS — I73.9 PVD (PERIPHERAL VASCULAR DISEASE) (HCC): ICD-10-CM

## 2024-07-31 DIAGNOSIS — I50.32 CHRONIC DIASTOLIC CONGESTIVE HEART FAILURE (HCC): ICD-10-CM

## 2024-07-31 PROCEDURE — 1036F TOBACCO NON-USER: CPT | Performed by: INTERNAL MEDICINE

## 2024-07-31 PROCEDURE — G8427 DOCREV CUR MEDS BY ELIG CLIN: HCPCS | Performed by: INTERNAL MEDICINE

## 2024-07-31 PROCEDURE — 99214 OFFICE O/P EST MOD 30 MIN: CPT | Performed by: INTERNAL MEDICINE

## 2024-07-31 PROCEDURE — 3075F SYST BP GE 130 - 139MM HG: CPT | Performed by: INTERNAL MEDICINE

## 2024-07-31 PROCEDURE — 3017F COLORECTAL CA SCREEN DOC REV: CPT | Performed by: INTERNAL MEDICINE

## 2024-07-31 PROCEDURE — G8417 CALC BMI ABV UP PARAM F/U: HCPCS | Performed by: INTERNAL MEDICINE

## 2024-07-31 PROCEDURE — 1123F ACP DISCUSS/DSCN MKR DOCD: CPT | Performed by: INTERNAL MEDICINE

## 2024-07-31 PROCEDURE — 3078F DIAST BP <80 MM HG: CPT | Performed by: INTERNAL MEDICINE

## 2024-07-31 RX ORDER — ASPIRIN 81 MG/1
81 TABLET ORAL NIGHTLY
Qty: 90 TABLET | Refills: 3
Start: 2024-07-31

## 2024-07-31 RX ORDER — SPIRONOLACTONE 25 MG/1
25 TABLET ORAL 2 TIMES DAILY
Qty: 180 TABLET | Refills: 1
Start: 2024-07-31 | End: 2024-08-01 | Stop reason: SDUPTHER

## 2024-07-31 RX ORDER — ATORVASTATIN CALCIUM 80 MG/1
80 TABLET, FILM COATED ORAL NIGHTLY
Qty: 90 TABLET | Refills: 3
Start: 2024-07-31

## 2024-07-31 NOTE — PATIENT INSTRUCTIONS
Plan:  Labs reviewed in epic and discussed with patient.   Current medications reviewed.  Refills given as warranted.  Your weight is up 22 pounds since I last saw you in 2021.  Try to watch what you eat and your portion sizes to help reduce your calorie intake.  Risks and benefits of being on two blood thinners discussed.  Recommend staying on both blood thinners at this time with your heart history to help reduce the risk of future heart attacks or strokes.     Increase lipitor to 80 mg nightly. It is ok to take two 40 mg tablets until your current bottle is finished and then you will only take one 80 mg tablet after that  Recommend starting spironolactone 25 mg daily.  Repeat blood work to check your kidneys in 7-10 days.  You do not need to be fasting.  Call my office and let me know that you had your blood work done so I can get a copy.  Fill out the patient assistance paperwork for entresto as soon as you can.   If you get denied for financial assistance then we can switch this medication.   Call the Medicare [employer] at [facility] at [phone] and talk to them about medicare plans that would meet your needs.   We will make some calls and see what we can do to help with lowering the cost of metoprolol succinate.   I will call Дмитрий Higgins's office and ask him to prescribe the spironolactone and atorvastatin for you so it would be cheaper.      Follow up with Pablo Kelly NP in 4-6 weeks  Follow up with me in 6 months.  Call in November to schedule your next appointment.

## 2024-08-01 RX ORDER — METOPROLOL SUCCINATE 50 MG/1
50 TABLET, EXTENDED RELEASE ORAL DAILY
Qty: 90 TABLET | Refills: 3 | Status: SHIPPED | OUTPATIENT
Start: 2024-08-01

## 2024-08-01 RX ORDER — SPIRONOLACTONE 25 MG/1
25 TABLET ORAL 2 TIMES DAILY
Qty: 180 TABLET | Refills: 1 | Status: SHIPPED | OUTPATIENT
Start: 2024-08-01

## 2024-08-01 NOTE — TELEPHONE ENCOUNTER
Called and spoke with [facility] who stated this medication is cheap and can be purchased with their discount card.  Pt informed and script sent

## 2024-08-01 NOTE — TELEPHONE ENCOUNTER
Pt states pharmacy does not have spironolactone. Pt would like this sent to [facility]. Please advise.    Last Ov- 07.31.24 JAM

## 2024-08-02 NOTE — TELEPHONE ENCOUNTER
Spoke with Дмитрий GONZALES and he will call in spironolactone and Atorvastatin to help with cost.

## 2024-08-13 ENCOUNTER — TELEPHONE (OUTPATIENT)
Dept: CARDIOLOGY CLINIC | Age: 69
End: 2024-08-13

## 2024-08-13 NOTE — TELEPHONE ENCOUNTER
Entresto patient assistance ppw approved. Scanned form into chart. LMOVM per pt's HIPAA form, informed pt that patient assistance was approved and will be delivered to our office.

## 2024-09-30 ASSESSMENT — ENCOUNTER SYMPTOMS: RESPIRATORY NEGATIVE: 1

## 2024-09-30 NOTE — PROGRESS NOTES
with PCP  7.  Follow-up in 3 months with Dr. Isaac at [facility] Kelly, APRN-[facility]  [phone]

## 2024-10-01 ENCOUNTER — OFFICE VISIT (OUTPATIENT)
Dept: CARDIOLOGY CLINIC | Age: 69
End: 2024-10-01
Payer: MEDICARE

## 2024-10-01 VITALS
SYSTOLIC BLOOD PRESSURE: 126 MMHG | DIASTOLIC BLOOD PRESSURE: 62 MMHG | HEIGHT: 67 IN | OXYGEN SATURATION: 97 % | HEART RATE: 71 BPM | WEIGHT: 217.2 LBS | BODY MASS INDEX: 34.09 KG/M2

## 2024-10-01 DIAGNOSIS — I25.5 ISCHEMIC CARDIOMYOPATHY: ICD-10-CM

## 2024-10-01 DIAGNOSIS — I25.10 CORONARY ARTERY DISEASE INVOLVING NATIVE CORONARY ARTERY OF NATIVE HEART WITHOUT ANGINA PECTORIS: Primary | ICD-10-CM

## 2024-10-01 PROCEDURE — 3078F DIAST BP <80 MM HG: CPT | Performed by: NURSE PRACTITIONER

## 2024-10-01 PROCEDURE — G8417 CALC BMI ABV UP PARAM F/U: HCPCS | Performed by: NURSE PRACTITIONER

## 2024-10-01 PROCEDURE — 99214 OFFICE O/P EST MOD 30 MIN: CPT | Performed by: NURSE PRACTITIONER

## 2024-10-01 PROCEDURE — G8427 DOCREV CUR MEDS BY ELIG CLIN: HCPCS | Performed by: NURSE PRACTITIONER

## 2024-10-01 PROCEDURE — 3017F COLORECTAL CA SCREEN DOC REV: CPT | Performed by: NURSE PRACTITIONER

## 2024-10-01 PROCEDURE — G8484 FLU IMMUNIZE NO ADMIN: HCPCS | Performed by: NURSE PRACTITIONER

## 2024-10-01 PROCEDURE — 1123F ACP DISCUSS/DSCN MKR DOCD: CPT | Performed by: NURSE PRACTITIONER

## 2024-10-01 PROCEDURE — 1036F TOBACCO NON-USER: CPT | Performed by: NURSE PRACTITIONER

## 2024-10-01 PROCEDURE — 3074F SYST BP LT 130 MM HG: CPT | Performed by: NURSE PRACTITIONER

## 2024-10-01 NOTE — PATIENT INSTRUCTIONS
Will see if jardiance 10 mg daily for heart strength is covered  Continue other medications  Stay active along with a healthy diet  Check BP at home and call the office if consistently out of goal range  Follow up with Dr. Isaac

## 2024-11-13 ENCOUNTER — TELEPHONE (OUTPATIENT)
Dept: CARDIOLOGY CLINIC | Age: 69
End: 2024-11-13

## 2025-01-21 NOTE — PROGRESS NOTES
[facility]   Cardiac Followup    Referring Provider:  Дмитрий Higgins, APRN - CNP     Chief Complaint   Patient presents with    Follow-up    Hypertension    Hyperlipidemia    Cardiomyopathy    Congestive Heart Failure      I saw originally 7/13/21 referred by NP Дмитрий Higgins for abnormal ECHO    Subjective: Mr. Stevenson is here for cardiology follow; c/o left shoulder pain, occ dizziness today    History of Present Illness:  Daniel Stevenson Jr. is a 69 y.o. male who has PMH CAD s/p 3V CABG 8/17/21 per Dr. 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 Lee. Prior testing: CTA head and neck 5/21/21 No flow-limiting arterial stenosis in the head and neck.    Lexiscan 7/30/21 EF=40%; multiple abnls; LHC 8/12/21 MV CAD/ASHD with LAD . Underwent 3V CABG on 8/17/2021.     Admitted 3/6/24 with LUQ and CP. Isamar Nuc 3/7/24 medium sized, severe intensity, apical and apical-inferior perfusion defect at stress with minimal improvement at rest c/w predominant scar and minimal ailyn-infarct ischemia. Recommended med mgt only with imdur added. Presented to ED 4/4/24 for rectal bleeding. Endoscopy showed polyps, external and internal hemorrhoids. On 4/11/24 Pablo Kelly NP stopped Imdur due to HA. Echo 7/25/24 EF=35-40% (40-45% in 6/21); mod global HK; AV trileaflet mild AV sclerosis.   OV 7/31/24 lipitor was increased to 80 mg daily and spironolactone 25mg BID started for GDMT.   Today, his wife is present.  He walked treadmill for a total of 3 hrs throughout the day yesterday and he got a little dizzy.  Patient denies current edema, chest pain, shortness of breath, palpitations, dizziness or syncope.  Patient is taking all cardiac medications as prescribed and tolerates them well.   Note he does NOT have drug plan and pays for meds out of pocket. He stopped smoking post-CABG.      Weight today is 234# (Up 10# from 7/24)     Past

## 2025-01-22 ENCOUNTER — OFFICE VISIT (OUTPATIENT)
Dept: CARDIOLOGY CLINIC | Age: 70
End: 2025-01-22
Payer: MEDICARE

## 2025-01-22 VITALS
OXYGEN SATURATION: 96 % | HEART RATE: 98 BPM | HEIGHT: 67 IN | DIASTOLIC BLOOD PRESSURE: 78 MMHG | SYSTOLIC BLOOD PRESSURE: 122 MMHG | WEIGHT: 234.8 LBS | BODY MASS INDEX: 36.85 KG/M2

## 2025-01-22 DIAGNOSIS — I73.9 PVD (PERIPHERAL VASCULAR DISEASE) (HCC): ICD-10-CM

## 2025-01-22 DIAGNOSIS — I50.32 CHRONIC DIASTOLIC CONGESTIVE HEART FAILURE (HCC): ICD-10-CM

## 2025-01-22 DIAGNOSIS — Z95.1 HX OF CABG: ICD-10-CM

## 2025-01-22 DIAGNOSIS — Z87.891 HISTORY OF TOBACCO ABUSE: ICD-10-CM

## 2025-01-22 DIAGNOSIS — I42.9 CARDIOMYOPATHY, UNSPECIFIED TYPE (HCC): ICD-10-CM

## 2025-01-22 DIAGNOSIS — I15.9 SECONDARY HYPERTENSION: Primary | ICD-10-CM

## 2025-01-22 DIAGNOSIS — I25.10 3-VESSEL CAD: ICD-10-CM

## 2025-01-22 PROCEDURE — 99214 OFFICE O/P EST MOD 30 MIN: CPT | Performed by: INTERNAL MEDICINE

## 2025-01-22 PROCEDURE — 1159F MED LIST DOCD IN RCRD: CPT | Performed by: INTERNAL MEDICINE

## 2025-01-22 PROCEDURE — 3078F DIAST BP <80 MM HG: CPT | Performed by: INTERNAL MEDICINE

## 2025-01-22 PROCEDURE — 3017F COLORECTAL CA SCREEN DOC REV: CPT | Performed by: INTERNAL MEDICINE

## 2025-01-22 PROCEDURE — G8427 DOCREV CUR MEDS BY ELIG CLIN: HCPCS | Performed by: INTERNAL MEDICINE

## 2025-01-22 PROCEDURE — 3074F SYST BP LT 130 MM HG: CPT | Performed by: INTERNAL MEDICINE

## 2025-01-22 PROCEDURE — G8417 CALC BMI ABV UP PARAM F/U: HCPCS | Performed by: INTERNAL MEDICINE

## 2025-01-22 PROCEDURE — 1036F TOBACCO NON-USER: CPT | Performed by: INTERNAL MEDICINE

## 2025-01-22 PROCEDURE — 1123F ACP DISCUSS/DSCN MKR DOCD: CPT | Performed by: INTERNAL MEDICINE

## 2025-01-22 RX ORDER — SPIRONOLACTONE 25 MG/1
25 TABLET ORAL 2 TIMES DAILY
Qty: 180 TABLET | Refills: 3 | Status: SHIPPED | OUTPATIENT
Start: 2025-01-22

## 2025-01-22 RX ORDER — ATORVASTATIN CALCIUM 80 MG/1
80 TABLET, FILM COATED ORAL NIGHTLY
Qty: 90 TABLET | Refills: 3 | Status: CANCELLED | OUTPATIENT
Start: 2025-01-22

## 2025-01-22 RX ORDER — CLOPIDOGREL BISULFATE 75 MG/1
75 TABLET ORAL DAILY
Qty: 90 TABLET | Refills: 3 | Status: CANCELLED | OUTPATIENT
Start: 2025-01-22

## 2025-01-22 NOTE — PATIENT INSTRUCTIONS
Plan:  Labs reviewed in epic and discussed with patient.   Current medications reviewed.  Refills given as warranted.  Your weight is up another 10 pounds since last office visit.  Try to watch your weight as best as possible.  Your weight was up 22 pounds at our last office visit.  We will get your financial assistance paperwork filed for entresto and jardiance.   Risks and benefits of being on two blood thinners discussed.  Recommend staying on both blood thinners at this time with your heart history to help reduce the risk of future heart attacks or strokes.     If you have worsening swelling call and let me know and I can prescribe you a medication called lasix to help reduce the swelling.    You can help decrease your swelling by:  -Weighing yourself every morning   -Following a No Added Salt/Low Sodium diet of less than 3000 mg sodium daily  -Following a Fluid Limitation of less than 2 liters (64 ounces daily)     -this also includes foods like soup, ice cream, popsicles  -Elevating your legs when sitting  -Wearing compression socks during the day     Follow up with me in 6 months.

## 2025-04-22 ENCOUNTER — OFFICE VISIT (OUTPATIENT)
Dept: ORTHOPEDIC SURGERY | Age: 70
End: 2025-04-22
Payer: MEDICARE

## 2025-04-22 VITALS — BODY MASS INDEX: 36.07 KG/M2 | WEIGHT: 238 LBS | HEIGHT: 68 IN

## 2025-04-22 DIAGNOSIS — M25.512 PAIN IN JOINT OF LEFT SHOULDER: Primary | ICD-10-CM

## 2025-04-22 DIAGNOSIS — M75.42 ROTATOR CUFF IMPINGEMENT SYNDROME OF LEFT SHOULDER: ICD-10-CM

## 2025-04-22 PROCEDURE — 1159F MED LIST DOCD IN RCRD: CPT | Performed by: ORTHOPAEDIC SURGERY

## 2025-04-22 PROCEDURE — G8427 DOCREV CUR MEDS BY ELIG CLIN: HCPCS | Performed by: ORTHOPAEDIC SURGERY

## 2025-04-22 PROCEDURE — 1036F TOBACCO NON-USER: CPT | Performed by: ORTHOPAEDIC SURGERY

## 2025-04-22 PROCEDURE — G8417 CALC BMI ABV UP PARAM F/U: HCPCS | Performed by: ORTHOPAEDIC SURGERY

## 2025-04-22 PROCEDURE — 99203 OFFICE O/P NEW LOW 30 MIN: CPT | Performed by: ORTHOPAEDIC SURGERY

## 2025-04-22 PROCEDURE — 1123F ACP DISCUSS/DSCN MKR DOCD: CPT | Performed by: ORTHOPAEDIC SURGERY

## 2025-04-22 PROCEDURE — 20610 DRAIN/INJ JOINT/BURSA W/O US: CPT | Performed by: ORTHOPAEDIC SURGERY

## 2025-04-22 PROCEDURE — 3017F COLORECTAL CA SCREEN DOC REV: CPT | Performed by: ORTHOPAEDIC SURGERY

## 2025-04-22 RX ORDER — TRIAMCINOLONE ACETONIDE 40 MG/ML
40 INJECTION, SUSPENSION INTRA-ARTICULAR; INTRAMUSCULAR ONCE
Status: COMPLETED | OUTPATIENT
Start: 2025-04-22 | End: 2025-04-22

## 2025-04-22 RX ORDER — BUPIVACAINE HYDROCHLORIDE 2.5 MG/ML
7 INJECTION, SOLUTION INFILTRATION; PERINEURAL ONCE
Status: COMPLETED | OUTPATIENT
Start: 2025-04-22 | End: 2025-04-22

## 2025-04-22 RX ADMIN — TRIAMCINOLONE ACETONIDE 40 MG: 40 INJECTION, SUSPENSION INTRA-ARTICULAR; INTRAMUSCULAR at 13:55

## 2025-04-22 RX ADMIN — BUPIVACAINE HYDROCHLORIDE 17.5 MG: 2.5 INJECTION, SOLUTION INFILTRATION; PERINEURAL at 13:54

## 2025-04-22 NOTE — PROGRESS NOTES
sacubitril-valsartan (ENTRESTO) 24-26 MG per tablet Take 1 tablet by mouth 2 times daily 60 tablet 3    Bacillus Coagulans-Inulin (PROBIOTIC) 1-250 BILLION-MG CAPS Take by mouth      pantoprazole sodium (PROTONIX) 40 MG PACK packet Take 0.5 packets by mouth every morning (before breakfast)      clopidogrel (PLAVIX) 75 MG tablet Take 1 tablet by mouth daily 90 tablet 1    acetaminophen (APAP EXTRA STRENGTH) 500 MG tablet Take 2 tablets by mouth every 6 hours as needed for Pain (Patient taking differently: Take 2 tablets by mouth in the morning and 2 tablets in the evening.) 120 tablet 3     No current facility-administered medications for this visit.       Social    Social History     Socioeconomic History    Marital status:      Spouse name: Not on file    Number of children: Not on file    Years of education: Not on file    Highest education level: Not on file   Occupational History    Not on file   Tobacco Use    Smoking status: Former     Current packs/day: 0.00     Types: Cigarettes     Quit date: 8/16/2021     Years since quitting: 3.6    Smokeless tobacco: Never   Vaping Use    Vaping status: Never Used   Substance and Sexual Activity    Alcohol use: Yes     Comment: one beer rarely    Drug use: No    Sexual activity: Yes     Partners: Female   Other Topics Concern    Not on file   Social History Narrative    Not on file     Social Drivers of Health     Financial Resource Strain: Not on file   Food Insecurity: Not on file   Transportation Needs: Not on file   Physical Activity: Not on file   Stress: Not on file   Social Connections: Not on file   Intimate Partner Violence: Not on file   Housing Stability: Not on file       Family HISTORY    Family History   Problem Relation Age of Onset    Alzheimer's Disease Mother     Dementia Father        PHYSICAL EXAM    Vital Signs:  Ht 1.727 m (5' 8\")   Wt 108 kg (238 lb)   BMI 36.19 kg/m²   General Appearance:  Normal body habitus. Alert and oriented to

## 2025-04-28 ENCOUNTER — HOSPITAL ENCOUNTER (EMERGENCY)
Age: 70
Discharge: HOME | End: 2025-04-28
Attending: EMERGENCY MEDICINE
Payer: MEDICARE

## 2025-04-28 VITALS
OXYGEN SATURATION: 99 % | TEMPERATURE: 98.1 F | HEART RATE: 96 BPM | SYSTOLIC BLOOD PRESSURE: 128 MMHG | RESPIRATION RATE: 16 BRPM | DIASTOLIC BLOOD PRESSURE: 86 MMHG

## 2025-04-28 DIAGNOSIS — K64.9 BLEEDING HEMORRHOID: Primary | ICD-10-CM

## 2025-04-28 LAB
ANION GAP SERPL CALCULATED.3IONS-SCNC: 12 MMOL/L (ref 3–16)
BASOPHILS # BLD: 0.1 K/UL (ref 0–0.2)
BASOPHILS NFR BLD: 0.5 %
BUN SERPL-MCNC: 20 MG/DL (ref 7–20)
CALCIUM SERPL-MCNC: 9 MG/DL (ref 8.3–10.6)
CHLORIDE SERPL-SCNC: 102 MMOL/L (ref 99–110)
CO2 SERPL-SCNC: 22 MMOL/L (ref 21–32)
CREAT SERPL-MCNC: 1 MG/DL (ref 0.8–1.3)
DEPRECATED RDW RBC AUTO: 13.9 % (ref 12.4–15.4)
EOSINOPHIL # BLD: 0.1 K/UL (ref 0–0.6)
EOSINOPHIL NFR BLD: 0.9 %
GFR SERPLBLD CREATININE-BSD FMLA CKD-EPI: 82 ML/MIN/{1.73_M2}
GLUCOSE SERPL-MCNC: 101 MG/DL (ref 70–99)
HCT VFR BLD AUTO: 42.1 % (ref 40.5–52.5)
HGB BLD-MCNC: 14.7 G/DL (ref 13.5–17.5)
LYMPHOCYTES # BLD: 1.3 K/UL (ref 1–5.1)
LYMPHOCYTES NFR BLD: 12 %
MCH RBC QN AUTO: 35.2 PG (ref 26–34)
MCHC RBC AUTO-ENTMCNC: 34.9 G/DL (ref 31–36)
MCV RBC AUTO: 100.8 FL (ref 80–100)
MONOCYTES # BLD: 0.9 K/UL (ref 0–1.3)
MONOCYTES NFR BLD: 8.5 %
NEUTROPHILS # BLD: 8.4 K/UL (ref 1.7–7.7)
NEUTROPHILS NFR BLD: 78.1 %
PLATELET # BLD AUTO: 234 K/UL (ref 135–450)
PMV BLD AUTO: 6.9 FL (ref 5–10.5)
POTASSIUM SERPL-SCNC: 4.8 MMOL/L (ref 3.5–5.1)
RBC # BLD AUTO: 4.18 M/UL (ref 4.2–5.9)
SODIUM SERPL-SCNC: 136 MMOL/L (ref 136–145)
WBC # BLD AUTO: 10.8 K/UL (ref 4–11)

## 2025-04-28 PROCEDURE — 80048 BASIC METABOLIC PNL TOTAL CA: CPT

## 2025-04-28 PROCEDURE — 85025 COMPLETE CBC W/AUTO DIFF WBC: CPT

## 2025-04-28 PROCEDURE — 99283 EMERGENCY DEPT VISIT LOW MDM: CPT

## 2025-04-28 NOTE — ED PROVIDER NOTES
vascular disease).    CONSULTS: (Who and What was discussed)  IP CONSULT TO GI  IP CONSULT TO GENERAL SURGERY      Records Reviewed (External, Source and Summary)     CC/HPI Summary, DDx, ED Course, and Reassessment:   This is a 69-year-old male who presents emergency department due to a bleeding hemorrhoid that started earlier today.  The patient is currently on Plavix and aspirin.  On arrival he is afebrile, hemodynamically stable, in no acute distress.  On physical exam there is a large external hemorrhoid that is actively bleeding.  No systemic symptoms were noted.  BMP without electrolyte disturbance renal insufficiency.  CBC without acute anemia, hemoglobin of 14.7.  I did consult GI and spoke with Dr. Cardona who stated that given the size and active bleeding on Plavix that he recommends consulting general surgery.  I did consult general surgery and spoke with Dr. Cason who recommended outpatient follow-up, patient will call tomorrow to schedule.  Also recommended holding the Plavix and to place Gelfoam and dressing.  At this time patient is stable for discharge home.  Hemorrhoid was cleaned and Gelfoam dressing was placed.  Patient was given additional supplies to redress at home.  Instructed to call general surgery tomorrow and referral was provided.  Also instructed to hold Plavix at this time.  Given strict return precautions.  Patient and patient's wife are in agreement with the plan.    Disposition Considerations (tests considered but not done, Admit vs D/C, Shared Decision Making, Pt Expectation of Test or Tx.):      The patient tolerated their visit well.  The patient and / or the family were informed of the results of any tests, a time was given to answer questions, a plan was proposed and they agreed with plan.    I am the Primary Clinician of Record.  FINAL IMPRESSION      1. Bleeding hemorrhoid          DISPOSITION/PLAN     DISPOSITION Decision To Discharge 04/28/2025 07:56:36 PM   DISPOSITION

## 2025-04-28 NOTE — DISCHARGE INSTRUCTIONS
You were given wound dressings, please continue using these while actively bleeding.  You are given a referral to general surgery with Dr. Cason, please call tomorrow to schedule a follow-up appointment.  Please hold your Plavix and aspirin until you follow-up with general surgery.  Return to this department for any new or worsening symptoms including lightheadedness, dizziness, chest pain, shortness of breath

## 2025-04-29 ENCOUNTER — TELEPHONE (OUTPATIENT)
Dept: CARDIOLOGY CLINIC | Age: 70
End: 2025-04-29

## 2025-04-29 NOTE — TELEPHONE ENCOUNTER
Risks of bleeding outweigh benefits with bleeding hemorrhoids now. OK to stay off plavix for now. I would d/w Dr. Cason's office and see if he recommends being off plavix that long. I will say after stopping plavix the blood thinning effects will last for 1 week. Let us know what he says.

## 2025-04-29 NOTE — TELEPHONE ENCOUNTER
Spoke with pt and relayed SMM message.  He will call us next week after he sees Dr. Cason and let us know what the new recommendations are.

## 2025-04-29 NOTE — TELEPHONE ENCOUNTER
Pt called today and stated he went to ER last night with large bleeding hemorrhoid.  GI and general surgery were contacted.  He was instructed to hold his plavix and to follow up with Dr. Cason outpt.  He reports he called Dr. Cason's office and does not have an appointment until May 6th.  He had 3V CABG in 2021.  He is not sure he should hold his plavix that long.  What are your recommendations?    Return call to pt at [phone]

## 2025-05-06 ENCOUNTER — INITIAL CONSULT (OUTPATIENT)
Dept: SURGERY | Age: 70
End: 2025-05-06
Payer: MEDICARE

## 2025-05-06 VITALS
BODY MASS INDEX: 33.77 KG/M2 | DIASTOLIC BLOOD PRESSURE: 80 MMHG | WEIGHT: 222.8 LBS | HEIGHT: 68 IN | SYSTOLIC BLOOD PRESSURE: 128 MMHG

## 2025-05-06 DIAGNOSIS — K64.5 PERIANAL VENOUS THROMBOSIS: Primary | ICD-10-CM

## 2025-05-06 PROCEDURE — G8417 CALC BMI ABV UP PARAM F/U: HCPCS | Performed by: SURGERY

## 2025-05-06 PROCEDURE — 1123F ACP DISCUSS/DSCN MKR DOCD: CPT | Performed by: SURGERY

## 2025-05-06 PROCEDURE — 1036F TOBACCO NON-USER: CPT | Performed by: SURGERY

## 2025-05-06 PROCEDURE — 1159F MED LIST DOCD IN RCRD: CPT | Performed by: SURGERY

## 2025-05-06 PROCEDURE — 1126F AMNT PAIN NOTED NONE PRSNT: CPT | Performed by: SURGERY

## 2025-05-06 PROCEDURE — G8427 DOCREV CUR MEDS BY ELIG CLIN: HCPCS | Performed by: SURGERY

## 2025-05-06 PROCEDURE — 3074F SYST BP LT 130 MM HG: CPT | Performed by: SURGERY

## 2025-05-06 PROCEDURE — 99203 OFFICE O/P NEW LOW 30 MIN: CPT | Performed by: SURGERY

## 2025-05-06 PROCEDURE — 3079F DIAST BP 80-89 MM HG: CPT | Performed by: SURGERY

## 2025-05-06 PROCEDURE — 3017F COLORECTAL CA SCREEN DOC REV: CPT | Performed by: SURGERY

## 2025-05-06 NOTE — PROGRESS NOTES
New Patient Consult    [facility]  Benjamin Burton MD    [address], [address]  [place] [address]  [phone]    Daniel Stevenson Jr.   YOB: 1955    Date of Visit:  5/6/2025      Дмитрий Higgins, APRN - CNP    Chief Complaint: Bleeding hemorrhoid    HPI: Patient presents for evaluation of a bleeding hemorrhoid.  He states this came up last week.  He is on Plavix and had bleeding, so he went to the emergency room.  He states that since that time it feels much better, and the bleeding has stopped    No Known Allergies  Outpatient Medications Marked as Taking for the 5/6/25 encounter (Initial consult) with Benjamin Burton MD   Medication Sig Dispense Refill    tiZANidine (ZANAFLEX) 4 MG tablet Take 1 tablet by mouth 3 times daily as needed (3 times a day) 30 tablet 0    spironolactone (ALDACTONE) 25 MG tablet Take 1 tablet by mouth 2 times daily 180 tablet 3    metoprolol succinate (TOPROL XL) 50 MG extended release tablet Take 1 tablet by mouth daily 90 tablet 3    atorvastatin (LIPITOR) 80 MG tablet Take 1 tablet by mouth nightly 90 tablet 3    aspirin 81 MG EC tablet Take 1 tablet by mouth nightly 90 tablet 3    sacubitril-valsartan (ENTRESTO) 24-26 MG per tablet Take 1 tablet by mouth 2 times daily 60 tablet 3    Bacillus Coagulans-Inulin (PROBIOTIC) 1-250 BILLION-MG CAPS Take by mouth      pantoprazole sodium (PROTONIX) 40 MG PACK packet Take 0.5 packets by mouth every morning (before breakfast)         Past Medical History:   Diagnosis Date    Blind in both eyes     legally blind - has minimal peripheral vision right eye    CAD (coronary artery disease)     Constipation     Histoplasmosis     Hyperlipidemia     Hypertension     PVD (peripheral vascular disease)      Past Surgical History:   Procedure Laterality Date    CORONARY ARTERY BYPASS GRAFT N/A 8/17/2021    CORONARY ARTERY BYPASS GRAFTING X3 , INTERNAL MAMMARY ARTERY, SAPHENOUS

## 2025-05-23 ENCOUNTER — OFFICE VISIT (OUTPATIENT)
Dept: ORTHOPEDIC SURGERY | Age: 70
End: 2025-05-23
Payer: MEDICARE

## 2025-05-23 VITALS — BODY MASS INDEX: 33.65 KG/M2 | WEIGHT: 222 LBS | HEIGHT: 68 IN

## 2025-05-23 DIAGNOSIS — M75.42 ROTATOR CUFF IMPINGEMENT SYNDROME OF LEFT SHOULDER: Primary | ICD-10-CM

## 2025-05-23 PROCEDURE — 1159F MED LIST DOCD IN RCRD: CPT | Performed by: ORTHOPAEDIC SURGERY

## 2025-05-23 PROCEDURE — G8417 CALC BMI ABV UP PARAM F/U: HCPCS | Performed by: ORTHOPAEDIC SURGERY

## 2025-05-23 PROCEDURE — 3017F COLORECTAL CA SCREEN DOC REV: CPT | Performed by: ORTHOPAEDIC SURGERY

## 2025-05-23 PROCEDURE — 1123F ACP DISCUSS/DSCN MKR DOCD: CPT | Performed by: ORTHOPAEDIC SURGERY

## 2025-05-23 PROCEDURE — G8427 DOCREV CUR MEDS BY ELIG CLIN: HCPCS | Performed by: ORTHOPAEDIC SURGERY

## 2025-05-23 PROCEDURE — 1036F TOBACCO NON-USER: CPT | Performed by: ORTHOPAEDIC SURGERY

## 2025-05-23 PROCEDURE — 99212 OFFICE O/P EST SF 10 MIN: CPT | Performed by: ORTHOPAEDIC SURGERY

## 2025-05-23 PROCEDURE — 1125F AMNT PAIN NOTED PAIN PRSNT: CPT | Performed by: ORTHOPAEDIC SURGERY

## 2025-05-23 NOTE — PROGRESS NOTES
He pr over 10 and he would like to continue his exercise program and observation only.  Esents today for evaluation he says between injection and physical therapy shoulder feels much better he does not have any intervention at this time describes his pain as worst 1.  He will continue with the exercise program on his own.

## 2025-07-21 NOTE — PROGRESS NOTES
[facility]   Cardiac Followup    Referring Provider:  Дмитрий Higgins, APRN - CNP     Chief Complaint   Patient presents with    Follow-up    Hypertension    Hyperlipidemia    Cardiomyopathy    chronic congestive heart failure      I saw originally 7/13/21 referred by NP Дмитрий Higgins for abnormal ECHO    Subjective: Mr. Stevenson is here for cardiology follow; c/o left shoulder pain today    History of Present Illness:  Daniel Stevenson Jr. is a 69 y.o. male who has PMH CAD s/p 3V CABG 8/17/21 per Dr. 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 Lee. Prior testing: CTA head and neck 5/21/21 No flow-limiting arterial stenosis in the head and neck.    Isamar nuc 7/30/21 EF=40%; multiple abnls; LHC 8/12/21 MV CAD/ASHD with LAD . Underwent 3V CABG on 8/17/2021.     Admitted 3/6/24 with LUQ and CP. Isamar Nuc 3/7/24 showed apical and apical-inferior perfusion defect at stress with minimal improvement at rest c/w predominant scar and minimal ailyn-infarct ischemia. Rec med mgt only with imdur added. Presented to ED 4/4/24 for rectal bleeding. Endoscopy showed polyps, external and internal hemorrhoids. On 4/11/24 Pablo Kelly NP stopped Imdur due to HA. Echo 7/25/24 EF=35-40% (40-45% in 6/21); mod global HK; AV trileaflet mild AV sclerosis.   Today he presents with his wife.  Reports feeling fine. He had a cortisone shot, muscle relaxer, and PT for his left shoulder and this has helped. Had bleeding hemorrhoid in 4/25 and went to ED for and released. Does not need surgery. States is not taking the Jardiance due not hearing if they were approved for company discount. Cannot afford without help. Otherwise he is compliant with medications.  Cost of medications is affordable.  No endorsed side effects.  He is trying to lift weights for exercise.  Planning on walking on the treadmill as well.      Note he does NOT have drug plan and

## 2025-07-23 ENCOUNTER — OFFICE VISIT (OUTPATIENT)
Dept: CARDIOLOGY CLINIC | Age: 70
End: 2025-07-23
Payer: MEDICARE

## 2025-07-23 VITALS
BODY MASS INDEX: 34.04 KG/M2 | OXYGEN SATURATION: 96 % | SYSTOLIC BLOOD PRESSURE: 120 MMHG | HEIGHT: 68 IN | HEART RATE: 94 BPM | WEIGHT: 224.6 LBS | DIASTOLIC BLOOD PRESSURE: 70 MMHG

## 2025-07-23 DIAGNOSIS — I42.9 CARDIOMYOPATHY, UNSPECIFIED TYPE (HCC): Primary | ICD-10-CM

## 2025-07-23 DIAGNOSIS — Z87.891 HISTORY OF TOBACCO ABUSE: ICD-10-CM

## 2025-07-23 DIAGNOSIS — E78.49 OTHER HYPERLIPIDEMIA: ICD-10-CM

## 2025-07-23 DIAGNOSIS — Z95.1 HX OF CABG: ICD-10-CM

## 2025-07-23 DIAGNOSIS — I25.10 3-VESSEL CAD: ICD-10-CM

## 2025-07-23 PROCEDURE — 1159F MED LIST DOCD IN RCRD: CPT | Performed by: INTERNAL MEDICINE

## 2025-07-23 PROCEDURE — G8417 CALC BMI ABV UP PARAM F/U: HCPCS | Performed by: INTERNAL MEDICINE

## 2025-07-23 PROCEDURE — 3078F DIAST BP <80 MM HG: CPT | Performed by: INTERNAL MEDICINE

## 2025-07-23 PROCEDURE — 3017F COLORECTAL CA SCREEN DOC REV: CPT | Performed by: INTERNAL MEDICINE

## 2025-07-23 PROCEDURE — 1036F TOBACCO NON-USER: CPT | Performed by: INTERNAL MEDICINE

## 2025-07-23 PROCEDURE — 99214 OFFICE O/P EST MOD 30 MIN: CPT | Performed by: INTERNAL MEDICINE

## 2025-07-23 PROCEDURE — 93000 ELECTROCARDIOGRAM COMPLETE: CPT | Performed by: INTERNAL MEDICINE

## 2025-07-23 PROCEDURE — 3074F SYST BP LT 130 MM HG: CPT | Performed by: INTERNAL MEDICINE

## 2025-07-23 PROCEDURE — 1123F ACP DISCUSS/DSCN MKR DOCD: CPT | Performed by: INTERNAL MEDICINE

## 2025-07-23 PROCEDURE — G8427 DOCREV CUR MEDS BY ELIG CLIN: HCPCS | Performed by: INTERNAL MEDICINE

## 2025-07-23 RX ORDER — METOPROLOL SUCCINATE 50 MG/1
50 TABLET, EXTENDED RELEASE ORAL 2 TIMES DAILY
Qty: 180 TABLET | Refills: 2 | Status: SHIPPED | OUTPATIENT
Start: 2025-07-23

## 2025-07-23 NOTE — PATIENT INSTRUCTIONS
Plan:  Labs reviewed in epic and discussed with patient.   FASTING blood work - Lipids   We will call you with the results   Current medications reviewed.  Refills given as warranted  Increase the metoprolol to 50 mg twice a day.  Once tablet in the AM and one tablet in the PM.  New prescription sent to your pharmacy.  Give your body a week to adjust to your medication. If you notice increased fatigue and dizziness after 1 week then stop the evening dose and call our office.    4.   Our office will work on getting the Jardiance approved.   5.   No cardiac testing warranted at this time

## (undated) DEVICE — CLIP INT L ORNG TI TRNSVRS GRV CHEVRON SHP W/ PRECIS TIP TO

## (undated) DEVICE — SUTURE NONABSORBABLE MONOFILAMENT 5-0 C-1 1X24 IN PROLENE 8725H

## (undated) DEVICE — PICO 7 10CM X 20CM: Brand: PICO™ 7

## (undated) DEVICE — SUTURE NONABSORBABLE MONOFILAMENT 4-0 RB-1 36 IN BLU PROLENE 8557H

## (undated) DEVICE — SNAP KOVER: Brand: UNBRANDED

## (undated) DEVICE — SUTURE NONABSORBABLE MONOFILAMENT 6-0 C-1 1X30 IN PROLENE 8706H

## (undated) DEVICE — RETRACTOR SURG INSRT SUT HLD OCTOBASE

## (undated) DEVICE — CHLORAPREP 26ML ORANGE

## (undated) DEVICE — LINE TBL CSC-14 FOR CARDPLG DEL SYS

## (undated) DEVICE — CANNULA PERF AD 20FR L8.5IN ART 3/8IN CONN NVENT MTL TIP

## (undated) DEVICE — PUNCH AORT DIA4MM LNG HNDL

## (undated) DEVICE — TRAY,ERASE CAUTI,URN MTR,SILI,16FR10ML: Brand: MEDLINE

## (undated) DEVICE — SUTURE NONABSORBABLE MONOFILAMENT 7-0 BV-1 1X24 IN PROLENE 8702H

## (undated) DEVICE — SSC BONE WAX: Brand: SSC BONE WAX

## (undated) DEVICE — SUTURE ETHBND EXCEL SZ 2-0 L36IN NONABSORBABLE GRN SH-2 X559H

## (undated) DEVICE — GOWN SIRUS NONREIN XL W/TWL: Brand: MEDLINE INDUSTRIES, INC.

## (undated) DEVICE — TUBING AUTOTRNS SUCT 1/4 IN LEN W/ ASPIR ANTICOAG SORIN

## (undated) DEVICE — CONNECTOR PERF W0.25XH3/8IN BASE Y SHP REDUC W/O LUERLOCK

## (undated) DEVICE — SUTURE SZ 7 L18IN NONABSORBABLE SIL CCS L48MM 1/2 CIR STRNM M655G

## (undated) DEVICE — Device

## (undated) DEVICE — GLOVE SURG SZ 8 L11.77IN FNGR THK9.8MIL STRW LTX POLYMER

## (undated) DEVICE — FLOSEAL HEMOSTATIC MATRIX, 5ML: Brand: FLOSEAL HEMOSTATIC MATRIX

## (undated) DEVICE — SUTURE PROL SZ 6-0 L24IN NONABSORBABLE BLU L13MM C-1 3/8 8726H

## (undated) DEVICE — INTRODUCER SHTH 6FR CANN L23CM DIL TIP 35MM GRN TUNGSTEN

## (undated) DEVICE — SUTURE MCRYL SZ 4-0 L18IN ABSRB UD L19MM PS-2 3/8 CIR PRIM Y496G

## (undated) DEVICE — SUTURE VCRL + SZ 3-0 L27IN ABSRB UD L26MM SH 1/2 CIR VCP416H

## (undated) DEVICE — SOLUTION IV IRRIG POUR BRL 0.9% SODIUM CHL 2F7124

## (undated) DEVICE — KIT,ANTI FOG,W/SPONGE & FLUID,SOFT PACK: Brand: MEDLINE

## (undated) DEVICE — SUTURE PDS II SZ 0 L36IN ABSRB VLT L36MM CT-1 1/2 CIR Z346H

## (undated) DEVICE — SNAP KAP: Brand: UNBRANDED

## (undated) DEVICE — CANNULA PERF 7FR L5.5IN AORT ROOT RADPQ STD TIP W/ VENT LN

## (undated) DEVICE — LEAD PACE 2.6FR L50CM UPLR TEMP ATR NONSTEROID ELUT PIN

## (undated) DEVICE — GUIDEWIRE VASC L260CM DIA0.035IN L15CM STR TIP PTFE S STL

## (undated) DEVICE — [HIGH FLOW INSUFFLATOR,  DO NOT USE IF PACKAGE IS DAMAGED,  KEEP DRY,  KEEP AWAY FROM SUNLIGHT,  PROTECT FROM HEAT AND RADIOACTIVE SOURCES.]: Brand: PNEUMOSURE

## (undated) DEVICE — RADIFOCUS GLIDECATH: Brand: GLIDECATH

## (undated) DEVICE — BLADE SURG MIC STR DBL BVL SHRP ALL ARND SHRPTOME

## (undated) DEVICE — KIT BLWR MISTER 5P 15L W/ TBNG SET IRRIG MIST TO IMPROVE

## (undated) DEVICE — STRAP POS W5XL72IN FOAM KNEE AND BODY HK LOOP CLSR DISP

## (undated) DEVICE — SUTURE ETHBND EXCEL SZ 0 L18IN NONABSORBABLE GRN L36MM CT-1 CX21D

## (undated) DEVICE — LEAD PACE L475MM CHNL A OR V MYOCARDIAL STEROID ELUT SIL

## (undated) DEVICE — LOOP VES L406MM DIA1MM MAXI BLU SIL RADPQ DISP

## (undated) DEVICE — INTENDED TO BE USED TO OCCLUDE, RETRACT AND IDENTIFY ARTERIES, VEINS, TENDONS AND NERVES IN SURGICAL PROCEDURES: Brand: STERION®  VESSEL LOOP

## (undated) DEVICE — COVER LT HNDL PLAS RIG 2 PER PK

## (undated) DEVICE — SUTURE VCRL SZ 3-0 L27IN ABSRB UD L26MM SH 1/2 CIR J416H

## (undated) DEVICE — ADHESIVE SKIN CLSR 0.7ML TOP DERMBND ADV

## (undated) DEVICE — SUTURE MCRYL + SZ 4-0 L18IN ABSRB UD L19MM PS-2 3/8 CIR MCP496G

## (undated) DEVICE — SPONGE LAP W18XL18IN WHT COT 4 PLY FLD STRUNG RADPQ DISP ST

## (undated) DEVICE — DRAIN SURG 24FR L5/16IN DIA8MM SIL RND HUBLESS FULL FLUT

## (undated) DEVICE — SUTURE PERMA-HAND SZ 2-0 L30IN NONABSORBABLE BLK L26MM SH K833H

## (undated) DEVICE — PATCH SURG FIBRIN SEAL 4.8X4.8 CM ABSORBABLE TACHOSIL

## (undated) DEVICE — SYRINGE ANGIO 12ML COR CTRL ROT ADPT SLD PLUNG CLR BRL M

## (undated) DEVICE — VESSEL HARVESTING KIT 7 MM ENDOSCP FOR PWR SUPL

## (undated) DEVICE — SUTURE GOR TX SZ 4-0 L30IN NONABSORBABLE L13MM THC-13 1/2 5M12A

## (undated) DEVICE — PACK PROCEDURE SURG OPN HRT A BASIC

## (undated) DEVICE — GAUZE,SPONGE,4"X4",16PLY,XRAY,STRL,LF: Brand: MEDLINE

## (undated) DEVICE — SUTURE VCRL + SZ 2-0 L27IN ABSRB CLR CT-1 1/2 CIR TAPERCUT VCP259H

## (undated) DEVICE — DISH PETRI ST LF

## (undated) DEVICE — FOGARTY - HYDRAGRIP SURGICAL - CLAMP INSERTS: Brand: FOGARTY SOFTJAW

## (undated) DEVICE — 3M™ IOBAN™ 2 ANTIMICROBIAL INCISE DRAPE 6650EZ: Brand: IOBAN™ 2

## (undated) DEVICE — STERILE LATEX POWDER-FREE SURGICAL GLOVESWITH NITRILE COATING: Brand: PROTEXIS